# Patient Record
Sex: FEMALE | Race: WHITE | NOT HISPANIC OR LATINO
[De-identification: names, ages, dates, MRNs, and addresses within clinical notes are randomized per-mention and may not be internally consistent; named-entity substitution may affect disease eponyms.]

---

## 2017-02-22 ENCOUNTER — APPOINTMENT (OUTPATIENT)
Dept: PULMONOLOGY | Facility: CLINIC | Age: 82
End: 2017-02-22

## 2017-05-01 ENCOUNTER — APPOINTMENT (OUTPATIENT)
Dept: PULMONOLOGY | Facility: CLINIC | Age: 82
End: 2017-05-01

## 2017-05-09 ENCOUNTER — APPOINTMENT (OUTPATIENT)
Dept: PULMONOLOGY | Facility: CLINIC | Age: 82
End: 2017-05-09

## 2017-05-09 VITALS — DIASTOLIC BLOOD PRESSURE: 60 MMHG | OXYGEN SATURATION: 93 % | HEART RATE: 83 BPM | SYSTOLIC BLOOD PRESSURE: 132 MMHG

## 2017-11-07 ENCOUNTER — APPOINTMENT (OUTPATIENT)
Dept: PULMONOLOGY | Facility: CLINIC | Age: 82
End: 2017-11-07
Payer: MEDICARE

## 2017-11-07 VITALS
OXYGEN SATURATION: 98 % | SYSTOLIC BLOOD PRESSURE: 142 MMHG | DIASTOLIC BLOOD PRESSURE: 60 MMHG | WEIGHT: 137 LBS | BODY MASS INDEX: 27.67 KG/M2 | HEART RATE: 107 BPM

## 2017-11-07 VITALS — RESPIRATION RATE: 18 BRPM

## 2017-11-07 DIAGNOSIS — J98.4 OTHER DISORDERS OF LUNG: ICD-10-CM

## 2017-11-07 DIAGNOSIS — J44.9 CHRONIC OBSTRUCTIVE PULMONARY DISEASE, UNSPECIFIED: ICD-10-CM

## 2017-11-07 DIAGNOSIS — I27.20 PULMONARY HYPERTENSION, UNSPECIFIED: ICD-10-CM

## 2017-11-07 PROCEDURE — 99214 OFFICE O/P EST MOD 30 MIN: CPT

## 2017-11-07 RX ORDER — ALBUTEROL SULFATE 90 UG/1
108 (90 BASE) AEROSOL, METERED RESPIRATORY (INHALATION)
Qty: 26 | Refills: 0 | Status: ACTIVE | COMMUNITY
Start: 2017-01-02

## 2017-11-07 RX ORDER — POTASSIUM CHLORIDE 1500 MG/1
20 TABLET, EXTENDED RELEASE ORAL
Qty: 90 | Refills: 0 | Status: DISCONTINUED | COMMUNITY
Start: 2017-01-27

## 2017-11-07 RX ORDER — DILTIAZEM HYDROCHLORIDE 240 MG/1
240 CAPSULE, COATED, EXTENDED RELEASE ORAL
Refills: 0 | Status: ACTIVE | COMMUNITY

## 2017-11-07 RX ORDER — WARFARIN 2 MG/1
2 TABLET ORAL
Qty: 90 | Refills: 0 | Status: DISCONTINUED | COMMUNITY
Start: 2017-04-13

## 2017-11-07 RX ORDER — MIRTAZAPINE 15 MG/1
15 TABLET, ORALLY DISINTEGRATING ORAL
Qty: 90 | Refills: 0 | Status: ACTIVE | COMMUNITY
Start: 2017-05-02

## 2017-11-07 RX ORDER — WARFARIN SODIUM 2.5 MG/1
2.5 TABLET ORAL
Refills: 0 | Status: DISCONTINUED | COMMUNITY

## 2017-11-07 RX ORDER — PREGABALIN 50 MG/1
50 CAPSULE ORAL
Refills: 0 | Status: ACTIVE | COMMUNITY

## 2017-11-07 RX ORDER — PEN NEEDLE, DIABETIC 29 G X1/2"
31G X 5 MM NEEDLE, DISPOSABLE MISCELLANEOUS
Qty: 360 | Refills: 0 | Status: ACTIVE | COMMUNITY
Start: 2017-03-22

## 2017-11-07 RX ORDER — AMLODIPINE BESYLATE 5 MG/1
5 TABLET ORAL
Qty: 90 | Refills: 0 | Status: DISCONTINUED | COMMUNITY
Start: 2017-03-23

## 2017-11-07 RX ORDER — PREDNISONE 20 MG/1
20 TABLET ORAL
Qty: 9 | Refills: 0 | Status: DISCONTINUED | COMMUNITY
Start: 2017-05-01

## 2017-11-07 RX ORDER — PANCRELIPASE 30000; 6000; 19000 [USP'U]/1; [USP'U]/1; [USP'U]/1
6000-19000 CAPSULE, DELAYED RELEASE PELLETS ORAL
Qty: 450 | Refills: 0 | Status: DISCONTINUED | COMMUNITY
Start: 2017-03-01

## 2017-11-07 RX ORDER — HYDRALAZINE HYDROCHLORIDE 50 MG/1
50 TABLET ORAL
Refills: 0 | Status: ACTIVE | COMMUNITY

## 2017-11-07 RX ORDER — METHYLPREDNISOLONE 4 MG/1
4 TABLET ORAL
Qty: 21 | Refills: 0 | Status: DISCONTINUED | COMMUNITY
Start: 2017-01-23

## 2017-11-07 RX ORDER — LIDOCAINE 5% 700 MG/1
5 PATCH TOPICAL
Qty: 90 | Refills: 0 | Status: ACTIVE | COMMUNITY
Start: 2017-05-08

## 2017-11-07 RX ORDER — ISOSORBIDE MONONITRATE 120 MG/1
120 TABLET, EXTENDED RELEASE ORAL
Qty: 90 | Refills: 0 | Status: ACTIVE | COMMUNITY
Start: 2017-01-02

## 2017-11-07 RX ORDER — METOLAZONE 5 MG/1
5 TABLET ORAL
Qty: 90 | Refills: 0 | Status: DISCONTINUED | COMMUNITY
Start: 2017-01-09

## 2017-11-07 RX ORDER — TORSEMIDE 20 MG/1
20 TABLET ORAL
Qty: 30 | Refills: 0 | Status: DISCONTINUED | COMMUNITY
Start: 2017-05-01

## 2017-11-07 RX ORDER — DOXYCYCLINE HYCLATE 100 MG/1
100 TABLET, DELAYED RELEASE ORAL
Qty: 14 | Refills: 0 | Status: DISCONTINUED | COMMUNITY
Start: 2017-01-21

## 2017-11-07 RX ORDER — PREDNISONE 10 MG/1
10 TABLET ORAL
Qty: 15 | Refills: 0 | Status: DISCONTINUED | COMMUNITY
Start: 2017-03-20

## 2017-11-07 RX ORDER — PREGABALIN 25 MG/1
25 CAPSULE ORAL
Qty: 60 | Refills: 0 | Status: DISCONTINUED | COMMUNITY
Start: 2017-01-18

## 2017-11-07 RX ORDER — CEFPODOXIME PROXETIL 100 MG/1
100 TABLET, FILM COATED ORAL
Qty: 14 | Refills: 0 | Status: DISCONTINUED | COMMUNITY
Start: 2017-01-02

## 2017-11-07 RX ORDER — METHIMAZOLE 5 MG/1
5 TABLET ORAL
Qty: 30 | Refills: 0 | Status: DISCONTINUED | COMMUNITY
Start: 2017-01-02

## 2017-11-07 RX ORDER — METHIMAZOLE 5 MG/1
5 TABLET ORAL
Refills: 0 | Status: ACTIVE | COMMUNITY

## 2017-11-15 ENCOUNTER — APPOINTMENT (OUTPATIENT)
Dept: COLORECTAL SURGERY | Facility: CLINIC | Age: 82
End: 2017-11-15
Payer: MEDICARE

## 2017-11-15 VITALS
BODY MASS INDEX: 26.7 KG/M2 | RESPIRATION RATE: 14 BRPM | OXYGEN SATURATION: 95 % | HEART RATE: 72 BPM | WEIGHT: 136 LBS | DIASTOLIC BLOOD PRESSURE: 67 MMHG | SYSTOLIC BLOOD PRESSURE: 131 MMHG | HEIGHT: 60 IN

## 2017-11-15 DIAGNOSIS — Z86.79 PERSONAL HISTORY OF OTHER DISEASES OF THE CIRCULATORY SYSTEM: ICD-10-CM

## 2017-11-15 DIAGNOSIS — Z87.39 PERSONAL HISTORY OF OTHER DISEASES OF THE MUSCULOSKELETAL SYSTEM AND CONNECTIVE TISSUE: ICD-10-CM

## 2017-11-15 DIAGNOSIS — Z86.39 PERSONAL HISTORY OF OTHER ENDOCRINE, NUTRITIONAL AND METABOLIC DISEASE: ICD-10-CM

## 2017-11-15 DIAGNOSIS — Z80.3 FAMILY HISTORY OF MALIGNANT NEOPLASM OF BREAST: ICD-10-CM

## 2017-11-15 DIAGNOSIS — Z86.69 PERSONAL HISTORY OF OTHER DISEASES OF THE NERVOUS SYSTEM AND SENSE ORGANS: ICD-10-CM

## 2017-11-15 DIAGNOSIS — C18.6 MALIGNANT NEOPLASM OF DESCENDING COLON: ICD-10-CM

## 2017-11-15 DIAGNOSIS — K56.609 UNSPECIFIED INTESTINAL OBSTRUCTION, UNSPECIFIED AS TO PARTIAL VERSUS COMPLETE OBSTRUCTION: ICD-10-CM

## 2017-11-15 PROCEDURE — 99205 OFFICE O/P NEW HI 60 MIN: CPT

## 2017-11-15 RX ORDER — ONDANSETRON 8 MG/1
4 TABLET, FILM COATED ORAL EVERY 6 HOURS
Qty: 0 | Refills: 0 | Status: DISCONTINUED | OUTPATIENT
Start: 2017-11-16 | End: 2017-11-26

## 2017-11-15 RX ORDER — HEPARIN SODIUM 5000 [USP'U]/ML
5000 INJECTION INTRAVENOUS; SUBCUTANEOUS EVERY 12 HOURS
Qty: 0 | Refills: 0 | Status: DISCONTINUED | OUTPATIENT
Start: 2017-11-16 | End: 2017-11-26

## 2017-11-16 ENCOUNTER — INPATIENT (INPATIENT)
Facility: HOSPITAL | Age: 82
LOS: 20 days | DRG: 329 | End: 2017-12-07
Attending: FAMILY MEDICINE | Admitting: COLON & RECTAL SURGERY
Payer: MEDICARE

## 2017-11-16 VITALS
SYSTOLIC BLOOD PRESSURE: 120 MMHG | RESPIRATION RATE: 19 BRPM | TEMPERATURE: 98 F | HEART RATE: 78 BPM | DIASTOLIC BLOOD PRESSURE: 74 MMHG

## 2017-11-16 DIAGNOSIS — Z90.81 ACQUIRED ABSENCE OF SPLEEN: Chronic | ICD-10-CM

## 2017-11-16 DIAGNOSIS — Z90.710 ACQUIRED ABSENCE OF BOTH CERVIX AND UTERUS: Chronic | ICD-10-CM

## 2017-11-16 DIAGNOSIS — C18.6 MALIGNANT NEOPLASM OF DESCENDING COLON: ICD-10-CM

## 2017-11-16 DIAGNOSIS — Z90.49 ACQUIRED ABSENCE OF OTHER SPECIFIED PARTS OF DIGESTIVE TRACT: Chronic | ICD-10-CM

## 2017-11-16 DIAGNOSIS — K56.609 UNSPECIFIED INTESTINAL OBSTRUCTION, UNSPECIFIED AS TO PARTIAL VERSUS COMPLETE OBSTRUCTION: ICD-10-CM

## 2017-11-16 PROBLEM — Z86.79 HISTORY OF IRREGULAR HEARTBEAT: Status: RESOLVED | Noted: 2017-11-16 | Resolved: 2017-11-16

## 2017-11-16 PROBLEM — Z86.69 HISTORY OF CATARACT: Status: RESOLVED | Noted: 2017-11-15 | Resolved: 2017-11-16

## 2017-11-16 PROBLEM — Z87.39 HISTORY OF GOUT: Status: RESOLVED | Noted: 2017-11-15 | Resolved: 2017-11-16

## 2017-11-16 PROBLEM — Z86.39 HISTORY OF DIABETES MELLITUS: Status: RESOLVED | Noted: 2017-11-15 | Resolved: 2017-11-16

## 2017-11-16 PROBLEM — Z86.79 HISTORY OF CARDIOMEGALY: Status: RESOLVED | Noted: 2017-11-16 | Resolved: 2017-11-16

## 2017-11-16 PROBLEM — Z80.3 FAMILY HISTORY OF MALIGNANT NEOPLASM OF BREAST: Status: ACTIVE | Noted: 2017-11-16

## 2017-11-16 PROBLEM — Z86.79 HISTORY OF HYPERTENSION: Status: RESOLVED | Noted: 2017-11-15 | Resolved: 2017-11-16

## 2017-11-16 LAB
ANION GAP SERPL CALC-SCNC: 10 MMOL/L — SIGNIFICANT CHANGE UP (ref 5–17)
APTT BLD: 44.7 SEC — HIGH (ref 27.5–37.4)
BUN SERPL-MCNC: 48 MG/DL — HIGH (ref 8–20)
CALCIUM SERPL-MCNC: 8.8 MG/DL — SIGNIFICANT CHANGE UP (ref 8.6–10.2)
CHLORIDE SERPL-SCNC: 98 MMOL/L — SIGNIFICANT CHANGE UP (ref 98–107)
CK SERPL-CCNC: 31 U/L — SIGNIFICANT CHANGE UP (ref 25–170)
CO2 SERPL-SCNC: 32 MMOL/L — HIGH (ref 22–29)
CREAT SERPL-MCNC: 1.38 MG/DL — HIGH (ref 0.5–1.3)
GLUCOSE BLDC GLUCOMTR-MCNC: 106 MG/DL — HIGH (ref 70–99)
GLUCOSE SERPL-MCNC: 170 MG/DL — HIGH (ref 70–115)
HCT VFR BLD CALC: 32.9 % — LOW (ref 37–47)
HGB BLD-MCNC: 9.9 G/DL — LOW (ref 12–16)
INR BLD: 2.21 RATIO — HIGH (ref 0.88–1.16)
MAGNESIUM SERPL-MCNC: 2.5 MG/DL — SIGNIFICANT CHANGE UP (ref 1.6–2.6)
MCHC RBC-ENTMCNC: 21.7 PG — LOW (ref 27–31)
MCHC RBC-ENTMCNC: 30.1 G/DL — LOW (ref 32–36)
MCV RBC AUTO: 72.1 FL — LOW (ref 81–99)
NT-PROBNP SERPL-SCNC: 906 PG/ML — HIGH (ref 0–300)
PHOSPHATE SERPL-MCNC: 3.1 MG/DL — SIGNIFICANT CHANGE UP (ref 2.4–4.7)
PLATELET # BLD AUTO: 506 K/UL — HIGH (ref 150–400)
POTASSIUM SERPL-MCNC: 4.3 MMOL/L — SIGNIFICANT CHANGE UP (ref 3.5–5.3)
POTASSIUM SERPL-SCNC: 4.3 MMOL/L — SIGNIFICANT CHANGE UP (ref 3.5–5.3)
PROTHROM AB SERPL-ACNC: 24.7 SEC — HIGH (ref 9.8–12.7)
RBC # BLD: 4.56 M/UL — SIGNIFICANT CHANGE UP (ref 4.4–5.2)
RBC # FLD: 20.1 % — HIGH (ref 11–15.6)
SODIUM SERPL-SCNC: 140 MMOL/L — SIGNIFICANT CHANGE UP (ref 135–145)
TROPONIN T SERPL-MCNC: 0.05 NG/ML — SIGNIFICANT CHANGE UP (ref 0–0.06)
WBC # BLD: 8 K/UL — SIGNIFICANT CHANGE UP (ref 4.8–10.8)
WBC # FLD AUTO: 8 K/UL — SIGNIFICANT CHANGE UP (ref 4.8–10.8)

## 2017-11-16 PROCEDURE — 71250 CT THORAX DX C-: CPT | Mod: 26

## 2017-11-16 PROCEDURE — 71010: CPT | Mod: 26

## 2017-11-16 PROCEDURE — 74176 CT ABD & PELVIS W/O CONTRAST: CPT | Mod: 26

## 2017-11-16 PROCEDURE — 93010 ELECTROCARDIOGRAM REPORT: CPT

## 2017-11-16 PROCEDURE — 99222 1ST HOSP IP/OBS MODERATE 55: CPT

## 2017-11-16 RX ORDER — POLYETHYLENE GLYCOL 3350 17 G/17G
17 POWDER, FOR SOLUTION ORAL DAILY
Qty: 0 | Refills: 0 | Status: DISCONTINUED | OUTPATIENT
Start: 2017-11-16 | End: 2017-11-27

## 2017-11-16 RX ORDER — ALBUTEROL 90 UG/1
0.08 AEROSOL, METERED ORAL
Qty: 0 | Refills: 0 | COMMUNITY

## 2017-11-16 RX ORDER — INSULIN GLARGINE 100 [IU]/ML
14 INJECTION, SOLUTION SUBCUTANEOUS
Qty: 0 | Refills: 0 | COMMUNITY

## 2017-11-16 RX ORDER — POLYETHYLENE GLYCOL 3350 17 G/17G
17 POWDER, FOR SOLUTION ORAL
Qty: 0 | Refills: 0 | COMMUNITY

## 2017-11-16 RX ORDER — METOPROLOL TARTRATE 50 MG
1 TABLET ORAL
Qty: 0 | Refills: 0 | COMMUNITY

## 2017-11-16 RX ORDER — WARFARIN SODIUM 2.5 MG/1
1 TABLET ORAL
Qty: 0 | Refills: 0 | COMMUNITY

## 2017-11-16 RX ORDER — TIOTROPIUM BROMIDE 18 UG/1
1 CAPSULE ORAL; RESPIRATORY (INHALATION)
Qty: 0 | Refills: 0 | COMMUNITY

## 2017-11-16 RX ORDER — TIOTROPIUM BROMIDE 18 UG/1
1 CAPSULE ORAL; RESPIRATORY (INHALATION) EVERY 6 HOURS
Qty: 0 | Refills: 0 | Status: DISCONTINUED | OUTPATIENT
Start: 2017-11-16 | End: 2017-11-16

## 2017-11-16 RX ORDER — SODIUM CHLORIDE 9 MG/ML
1000 INJECTION INTRAMUSCULAR; INTRAVENOUS; SUBCUTANEOUS
Qty: 0 | Refills: 0 | Status: DISCONTINUED | OUTPATIENT
Start: 2017-11-16 | End: 2017-11-17

## 2017-11-16 RX ORDER — INSULIN GLARGINE 100 [IU]/ML
14 INJECTION, SOLUTION SUBCUTANEOUS EVERY MORNING
Qty: 0 | Refills: 0 | Status: DISCONTINUED | OUTPATIENT
Start: 2017-11-16 | End: 2017-11-25

## 2017-11-16 RX ORDER — METOPROLOL TARTRATE 50 MG
25 TABLET ORAL
Qty: 0 | Refills: 0 | Status: DISCONTINUED | OUTPATIENT
Start: 2017-11-16 | End: 2017-11-26

## 2017-11-16 RX ORDER — ASCORBIC ACID 60 MG
1 TABLET,CHEWABLE ORAL
Qty: 0 | Refills: 0 | COMMUNITY

## 2017-11-16 RX ORDER — MIRTAZAPINE 45 MG/1
1 TABLET, ORALLY DISINTEGRATING ORAL
Qty: 0 | Refills: 0 | COMMUNITY

## 2017-11-16 RX ORDER — HYDRALAZINE HCL 50 MG
5 TABLET ORAL
Qty: 0 | Refills: 0 | COMMUNITY

## 2017-11-16 RX ORDER — WARFARIN SODIUM 2.5 MG/1
5 TABLET ORAL ONCE
Qty: 0 | Refills: 0 | Status: DISCONTINUED | OUTPATIENT
Start: 2017-11-16 | End: 2017-11-16

## 2017-11-16 RX ORDER — ALBUTEROL 90 UG/1
2.5 AEROSOL, METERED ORAL EVERY 6 HOURS
Qty: 0 | Refills: 0 | Status: DISCONTINUED | OUTPATIENT
Start: 2017-11-16 | End: 2017-11-29

## 2017-11-16 RX ORDER — DILTIAZEM HCL 120 MG
240 CAPSULE, EXT RELEASE 24 HR ORAL DAILY
Qty: 0 | Refills: 0 | Status: DISCONTINUED | OUTPATIENT
Start: 2017-11-16 | End: 2017-11-27

## 2017-11-16 RX ORDER — LIPASE/PROTEASE/AMYLASE 16-48-48K
1 CAPSULE,DELAYED RELEASE (ENTERIC COATED) ORAL
Qty: 0 | Refills: 0 | COMMUNITY

## 2017-11-16 RX ORDER — LIPASE/PROTEASE/AMYLASE 16-48-48K
1 CAPSULE,DELAYED RELEASE (ENTERIC COATED) ORAL
Qty: 0 | Refills: 0 | Status: DISCONTINUED | OUTPATIENT
Start: 2017-11-16 | End: 2017-12-06

## 2017-11-16 RX ORDER — ACETAMINOPHEN 500 MG
1 TABLET ORAL
Qty: 0 | Refills: 0 | COMMUNITY

## 2017-11-16 RX ORDER — MIRTAZAPINE 45 MG/1
15 TABLET, ORALLY DISINTEGRATING ORAL DAILY
Qty: 0 | Refills: 0 | Status: DISCONTINUED | OUTPATIENT
Start: 2017-11-16 | End: 2017-11-27

## 2017-11-16 RX ORDER — HYDRALAZINE HCL 50 MG
50 TABLET ORAL
Qty: 0 | Refills: 0 | Status: DISCONTINUED | OUTPATIENT
Start: 2017-11-16 | End: 2017-11-26

## 2017-11-16 RX ORDER — ACETAMINOPHEN 500 MG
325 TABLET ORAL EVERY 6 HOURS
Qty: 0 | Refills: 0 | Status: DISCONTINUED | OUTPATIENT
Start: 2017-11-16 | End: 2017-11-19

## 2017-11-16 RX ORDER — ASCORBIC ACID 60 MG
500 TABLET,CHEWABLE ORAL DAILY
Qty: 0 | Refills: 0 | Status: DISCONTINUED | OUTPATIENT
Start: 2017-11-16 | End: 2017-12-06

## 2017-11-16 RX ORDER — DILTIAZEM HCL 120 MG
1 CAPSULE, EXT RELEASE 24 HR ORAL
Qty: 0 | Refills: 0 | COMMUNITY

## 2017-11-16 RX ORDER — ISOSORBIDE MONONITRATE 60 MG/1
120 TABLET, EXTENDED RELEASE ORAL DAILY
Qty: 0 | Refills: 0 | Status: DISCONTINUED | OUTPATIENT
Start: 2017-11-16 | End: 2017-11-27

## 2017-11-16 RX ORDER — TIOTROPIUM BROMIDE 18 UG/1
1 CAPSULE ORAL; RESPIRATORY (INHALATION) DAILY
Qty: 0 | Refills: 0 | Status: DISCONTINUED | OUTPATIENT
Start: 2017-11-16 | End: 2017-11-29

## 2017-11-16 RX ORDER — ISOSORBIDE MONONITRATE 60 MG/1
1 TABLET, EXTENDED RELEASE ORAL
Qty: 0 | Refills: 0 | COMMUNITY

## 2017-11-16 RX ORDER — LIDOCAINE 4 G/100G
1 CREAM TOPICAL DAILY
Qty: 0 | Refills: 0 | Status: DISCONTINUED | OUTPATIENT
Start: 2017-11-17 | End: 2017-12-06

## 2017-11-16 RX ADMIN — ALBUTEROL 2.5 MILLIGRAM(S): 90 AEROSOL, METERED ORAL at 12:30

## 2017-11-16 RX ADMIN — Medication 1 TABLET(S): at 21:37

## 2017-11-16 RX ADMIN — HEPARIN SODIUM 5000 UNIT(S): 5000 INJECTION INTRAVENOUS; SUBCUTANEOUS at 18:03

## 2017-11-16 RX ADMIN — Medication 50 MILLIGRAM(S): at 21:37

## 2017-11-16 RX ADMIN — Medication 25 MILLIGRAM(S): at 21:36

## 2017-11-16 RX ADMIN — Medication 25 MILLIGRAM(S): at 21:37

## 2017-11-16 NOTE — H&P ADULT - PMH
Atrial fibrillation, unspecified type    Cardiomegaly    Congestive heart failure, unspecified congestive heart failure chronicity, unspecified congestive heart failure type    Diabetes Aortic valve insufficiency, etiology of cardiac valve disease unspecified    Atrial fibrillation, unspecified type    Cardiomegaly    Congestive heart failure, unspecified congestive heart failure chronicity, unspecified congestive heart failure type    COPD, mild    Diabetes    IDDM (insulin dependent diabetes mellitus)    ILD (interstitial lung disease)    Malignant neoplasm of descending colon    Microcytic anemia    Mitral valve insufficiency, unspecified etiology    Renal insufficiency    Tricuspid valve insufficiency, unspecified etiology

## 2017-11-16 NOTE — CONSULT NOTE ADULT - SUBJECTIVE AND OBJECTIVE BOX
Chief Complaint: Pre-op eval-cardiac    HPI: 89 yo female admitted for colon surgery: recently found colon mass on colonoscopy. Pt had abd distension and bloating according to . (old records reviewed). PMH+ for atrial fib/interstitial lung disease/hypertension/iddm/splenectomy/gb/hysteresctomy. ?cva/hyperthyroidism. Denies syncope/renal/heme hx. ROS +liu but no hx of home O2 use/cp/angina. No hx of MI. Hx of AI/TR/MR.  Allergy to cipro/pcn/asa/iodine. Never smoked. No etoh. OP meds of insulin/torsemide 40 qd/zaroxolyn 5 qd/lyrica/carduizem 240/spiriva/albuterol/creon/hydralzine/tapezole/coumadin.    PAST MEDICAL & SURGICAL HISTORY:  H/O: hysterectomy  History of laparoscopic cholecystectomy  H/O splenectomy      PREVIOUS DIAGNOSTIC TESTING:      ECHO  FINDINGS: LVEF 65%/mod AI/TR/MR with moderate pulmonary hpt. 2017.    STRESS  FINDINGS:    CATHETERIZATION  FINDINGS: 2013-nl coronaries.    MEDICATIONS  (STANDING):  amylase/lipase/protease  (CREON  6,000 Units) 1 Capsule(s) Oral three times a day with meals  ascorbic acid 500 milliGRAM(s) Oral daily  calcium carbonate 1250 mG + Vitamin D (OsCal 500 + D) 1 Tablet(s) Oral two times a day  diltiazem    milliGRAM(s) Oral daily  heparin  Injectable 5000 Unit(s) SubCutaneous every 12 hours  hydrALAZINE 50 milliGRAM(s) Oral two times a day  insulin glargine Injectable (LANTUS) 14 Unit(s) SubCutaneous every morning  isosorbide   mononitrate ER Tablet (IMDUR) 120 milliGRAM(s) Oral daily  lidocaine   Patch 1 Patch Transdermal <User Schedule>  methimazole 2.5 milliGRAM(s) Oral <User Schedule>  metolazone 5 milliGRAM(s) Oral before breakfast  metoprolol     tartrate 25 milliGRAM(s) Oral two times a day  mirtazapine 15 milliGRAM(s) Oral daily  polyethylene glycol 3350 17 Gram(s) Oral daily  pregabalin 25 milliGRAM(s) Oral two times a day  tiotropium 18 MICROgram(s) Capsule 1 Capsule(s) Inhalation daily  torsemide 40 milliGRAM(s) Oral before breakfast    MEDICATIONS  (PRN):  acetaminophen  Suppository. 325 milliGRAM(s) Rectal every 6 hours PRN Mild Pain (1 - 3)  ALBUTerol    0.083% 2.5 milliGRAM(s) Nebulizer every 6 hours PRN Shortness of Breath  ondansetron Injectable 4 milliGRAM(s) IV Push every 6 hours PRN Nausea      FAMILY HISTORY:  No pertinent family history in first degree relatives      SOCIAL HISTORY:     CIGARETTES: 0    ALCOHOL: 0    ROS: Negative other than as mentioned in HPI.    Vital Signs Last 24 Hrs  T(C): 36.6 (16 Nov 2017 10:29), Max: 36.6 (16 Nov 2017 10:29)  T(F): 97.9 (16 Nov 2017 10:29), Max: 97.9 (16 Nov 2017 10:29)  HR: 78 (16 Nov 2017 10:29) (78 - 78)  BP: 110/70  RA manually(16 Nov 2017 10:29) (120/74 - 120/74)  BP(mean): --  RR: 18 non-labored (16 Nov 2017 10:29) (19 - 19)  SpO2: --    PHYSICAL EXAM:  General: Appears well developed, well nourished alert and cooperative. Elderly alert [place] afebrile kyphotic F nad.  HEENT: Head; normocephalic, atraumatic.  Eyes;   Pupils reactive, cornea wnl.  Neck; Supple, no nodes adenopathy, no NVD or carotid bruit or thyromegaly.  CARDIOVASCULAR; No murmur, rub, gallop or lift. Normal S1 and S2. Irreg rhythm/distant heart tones.  LUNGS; mildly coarse BS bilat.  ABDOMEN ; Soft, nontender without mass or organomegaly. bowel sounds decreased  EXTREMITIES; No clubbing, cyanosis or edema. Distal pulses ? ROM normal.  SKIN; warm and dry with normal turgor.  NEURO; Alert/oriented x 3/normal motor exam. No pathologic reflexes.    PSYCH; normal affect.            INTERPRETATION OF TELEMETRY:    ECG: Afib QS V1-3 nsst&t abnl.  CXR-aoical lordotic with ca aortic knob.  I&O's Detail      LABS:                        9.9    8.0   )-----------( 506      ( 16 Nov 2017 15:15 )             32.9     11-16    140  |  98  |  48.0<H>  ----------------------------<  170<H>  4.3   |  32.0<H>  |  1.38<H>    Ca    8.8      16 Nov 2017 15:15  Phos  3.1     11-16  Mg     2.5     11-16          PT/INR - ( 16 Nov 2017 15:14 )   PT: 24.7 sec;   INR: 2.21 ratio         PTT - ( 16 Nov 2017 15:14 )  PTT:44.7 sec    I&O's Summary      RADIOLOGY & ADDITIONAL STUDIES:

## 2017-11-16 NOTE — CONSULT NOTE ADULT - SUBJECTIVE AND OBJECTIVE BOX
PULMONARY CONSULT NOTE      EUGENE QUINNROLAND-[id number]    Patient is a 89y old  Female who presents with a chief complaint of Colon Mass    INTERVAL HPI/OVERNIGHT EVENTS:Pt is 88 yo WF found to have Descending colon mass.  Known to me for many years.  Has mild COPD with pulm HTN.  PFT's show FEV1 66% pred and FVC  72%.  Seen in office last week--RA sat 98%. Denies any change in baseline resp sx of KENNEDY.    MEDICATIONS  (STANDING):  amylase/lipase/protease  (CREON  6,000 Units) 1 Capsule(s) Oral three times a day with meals  ascorbic acid 500 milliGRAM(s) Oral daily  calcium carbonate 1250 mG + Vitamin D (OsCal 500 + D) 1 Tablet(s) Oral two times a day  diltiazem    milliGRAM(s) Oral daily  heparin  Injectable 5000 Unit(s) SubCutaneous every 12 hours  hydrALAZINE 50 milliGRAM(s) Oral two times a day  insulin glargine Injectable (LANTUS) 14 Unit(s) SubCutaneous every morning  isosorbide   mononitrate ER Tablet (IMDUR) 120 milliGRAM(s) Oral daily  lidocaine   Patch 1 Patch Transdermal <User Schedule>  methimazole 2.5 milliGRAM(s) Oral <User Schedule>  metolazone 5 milliGRAM(s) Oral before breakfast  metoprolol     tartrate 25 milliGRAM(s) Oral two times a day  mirtazapine 15 milliGRAM(s) Oral daily  polyethylene glycol 3350 17 Gram(s) Oral daily  pregabalin 25 milliGRAM(s) Oral two times a day  tiotropium 18 MICROgram(s) Capsule 1 Capsule(s) Inhalation daily  torsemide 40 milliGRAM(s) Oral before breakfast      MEDICATIONS  (PRN):  acetaminophen  Suppository. 325 milliGRAM(s) Rectal every 6 hours PRN Mild Pain (1 - 3)  ALBUTerol    0.083% 2.5 milliGRAM(s) Nebulizer every 6 hours PRN Shortness of Breath  ondansetron Injectable 4 milliGRAM(s) IV Push every 6 hours PRN Nausea      Allergies    Cipro (Unknown)  contrast media (iodine-based) (Unknown)  penicillins (Unknown)  shellfish (Unknown)  Valium (Unknown)    Intolerances        PAST MEDICAL & SURGICAL HISTORY:      FAMILY HISTORY:      SOCIAL HISTORY  Smoking History: Former    REVIEW OF SYSTEMS:    CONSTITUTIONAL:  As per HPI.    HEENT:  Eyes:  No diplopia or blurred vision. ENT:  No earache, sore throat or runny nose.    CARDIOVASCULAR:  No pressure, squeezing, tightness, heaviness or aching about the chest; no palpitations.    RESPIRATORY:  per hpi    GASTROINTESTINAL:  No nausea, vomiting or diarrhea.    GENITOURINARY:  No dysuria, frequency or urgency.    MUSCULOSKELETAL:  No joint pains    SKIN:  No new lesions.    NEUROLOGIC:  No paresthesias, fasciculations, seizures or weakness.    PSYCHIATRIC:  No disorder of thought or mood.    ENDOCRINE:  No heat or cold intolerance, polyuria or polydipsia.    HEMATOLOGICAL:  No easy bruising or bleeding.     Vital Signs Last 24 Hrs  T(C): 36.6 (16 Nov 2017 10:29), Max: 36.6 (16 Nov 2017 10:29)  T(F): 97.9 (16 Nov 2017 10:29), Max: 97.9 (16 Nov 2017 10:29)  HR: 78 (16 Nov 2017 10:29) (78 - 78)  BP: 120/74 (16 Nov 2017 10:29) (120/74 - 120/74)  BP(mean): --  RR: 19 (16 Nov 2017 10:29) (19 - 19)  SpO2: --    PHYSICAL EXAMINATION:    GENERAL: The patient is a well-developed, well-nourished elderly WF_in no apparent distress.     HEENT: Head is normocephalic and atraumatic. Extraocular muscles are intact. Mucous membranes are moist.     NECK: Supple.     LUNGS: crackles at bases  HEART: Regular rate and rhythm without murmur.    ABDOMEN: Soft, nontender, and nondistended.  No hepatosplenomegaly is noted.    EXTREMITIES: Without any cyanosis, clubbing, rash, lesions or edema.    NEUROLOGIC: Grossly intact.    SKIN: No ulceration or induration present.      LABS:                              MICROBIOLOGY:    RADIOLOGY & ADDITIONAL STUDIES:CXR personally reviewed.  NAPD

## 2017-11-16 NOTE — H&P ADULT - PROBLEM SELECTOR PLAN 1
Urgent Pulmonary, Cardiology, Primary care consults,     Stat ekg and CXR, cardiac enzymes/troponin, BNP    chk labs    oxygen nasal cannula    CT chest/abd/pelvis,

## 2017-11-16 NOTE — CONSULT NOTE ADULT - SUBJECTIVE AND OBJECTIVE BOX
Chief Complaint: colon mass    HPI: Old records reviewed. 88 yo Female with recent colon mass detected on colonoscopy for abd bloating. Schedule for resection. PMH + for interstitial lung disease with KENNEDY but no home O2 use/diastolic heart failure/iddm/hypertension/splenectomy/GB/hysterectomy/benign breast tumors/atrial fib-on coumadin/aortic insufficiency/mitral insufficiency. Denies MI or cp/renal/heme. ?prior cva. Hx of hyperthyroidism. Never smoked and no etoh. Allergy to cipro/pcn/iodine and asa. ROS neg for orthopnea/pnd/melena/cp/weight loss. OP meds include; albuterol/vitc/creon/cardizem 240/hydralazine/insulin/lyrica/spiriva/torsemide 40 qd/zaroxoly 5 qd/metimazole and Ca++.    PAST MEDICAL & SURGICAL HISTORY: see above      PREVIOUS DIAGNOSTIC TESTING:      ECHO  FINDINGS: Feb 2017: LVEF 65%/mild LVH/moderate AI/mod TR/moderate PA hypertsion/mod MR.    STRESS  FINDINGS:    CATHETERIZATION  FINDINGS:  2013-nl coronaries.    MEDICATIONS  (STANDING):  amylase/lipase/protease  (CREON  6,000 Units) 1 Capsule(s) Oral three times a day with meals  ascorbic acid 500 milliGRAM(s) Oral daily  calcium carbonate 1250 mG + Vitamin D (OsCal 500 + D) 1 Tablet(s) Oral two times a day  diltiazem    milliGRAM(s) Oral daily  heparin  Injectable 5000 Unit(s) SubCutaneous every 12 hours  hydrALAZINE 50 milliGRAM(s) Oral two times a day  insulin glargine Injectable (LANTUS) 14 Unit(s) SubCutaneous every morning  isosorbide   mononitrate ER Tablet (IMDUR) 120 milliGRAM(s) Oral daily  lidocaine   Patch 1 Patch Transdermal <User Schedule>  methimazole 2.5 milliGRAM(s) Oral <User Schedule>  metolazone 5 milliGRAM(s) Oral before breakfast  metoprolol     tartrate 25 milliGRAM(s) Oral two times a day  mirtazapine 15 milliGRAM(s) Oral daily  polyethylene glycol 3350 17 Gram(s) Oral daily  pregabalin 25 milliGRAM(s) Oral two times a day  tiotropium 18 MICROgram(s) Capsule 1 Capsule(s) Inhalation daily  torsemide 40 milliGRAM(s) Oral before breakfast    MEDICATIONS  (PRN):  acetaminophen  Suppository. 325 milliGRAM(s) Rectal every 6 hours PRN Mild Pain (1 - 3)  ALBUTerol    0.083% 2.5 milliGRAM(s) Nebulizer every 6 hours PRN Shortness of Breath  ondansetron Injectable 4 milliGRAM(s) IV Push every 6 hours PRN Nausea      FAMILY HISTORY:      SOCIAL HISTORY:     CIGARETTES: 0    ALCOHOL: 0    ROS: Negative other than as mentioned in HPI.    Vital Signs Last 24 Hrs  T(C): 36.6 (16 Nov 2017 10:29), Max: 36.6 (16 Nov 2017 10:29)  T(F): 97.9 (16 Nov 2017 10:29), Max: 97.9 (16 Nov 2017 10:29)  HR: 78 (16 Nov 2017 10:29) (78 - 78)  BP: 110/70 (16 Nov 2017 10:29) (120/74 - 120/74)  BP(mean): --  RR: 18 (16 Nov 2017 10:29) (19 - 19) non labored  SpO2: --    PHYSICAL EXAM:  General: Appears well developed, well nourished alert and cooperative. Elderly kyphotic afebrile F nad  HEENT: Head; normocephalic, atraumatic.  Eyes;   Pupils reactive, cornea wnl.  Neck; Supple, no nodes adenopathy, no NVD or carotid bruit or thyromegaly.  CARDIOVASCULAR; No murmur, rub, gallop or lift. Normal S1 and S2. Heart tones are distant.  LUNGS; mildly coarse BS bilat.  ABDOMEN ; Soft, nontender without mass or organomegaly. bowel sounds decreased.  EXTREMITIES; No clubbing, cyanosis or edema. Distal pulses ? ROM normal.  SKIN; warm and dry with normal turgor.  NEURO; Alert/oriented x 3/normal motor exam. No pathologic reflexes.    PSYCH; normal affect.            INTERPRETATION OF TELEMETRY:    ECG: a-fibQS V1-3 nonspecific st and t abnl-no change    I&O's Detail      LABS:                        9.9    8.0   )-----------( 506      ( 16 Nov 2017 15:15 )             32.9     11-16    140  |  98  |  48.0<H>  ----------------------------<  170<H>  4.3   |  32.0<H>  |  1.38<H>    Ca    8.8      16 Nov 2017 15:15  Phos  3.1     11-16  Mg     2.5     11-16          PT/INR - ( 16 Nov 2017 15:14 )   PT: 24.7 sec;   INR: 2.21 ratio         PTT - ( 16 Nov 2017 15:14 )  PTT:44.7 sec    I&O's Summary      RADIOLOGY & ADDITIONAL STUDIES:

## 2017-11-16 NOTE — H&P ADULT - HISTORY OF PRESENT ILLNESS
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 Pt denies Nausea/vomiting, diarrhea. C/o constipation. Denies fever/chills.    Pt direct admission for operative intervention by Dr Flowers on 11/20.    Pt c/o SOB/dyspnea and mild vague chest discomfort upon admission to the floor, also c/o tender inner left lower leg, also c/o LE edema L > R and coldness of LE's    Pulmonary consult - Dr Bang,    Primary care consult - Dr Barbosa    Cardiology consult - Dr Bush

## 2017-11-17 LAB
ALBUMIN SERPL ELPH-MCNC: 3.1 G/DL — LOW (ref 3.3–5.2)
ALP SERPL-CCNC: 87 U/L — SIGNIFICANT CHANGE UP (ref 40–120)
ALT FLD-CCNC: 11 U/L — SIGNIFICANT CHANGE UP
ANION GAP SERPL CALC-SCNC: 10 MMOL/L — SIGNIFICANT CHANGE UP (ref 5–17)
ANISOCYTOSIS BLD QL: SLIGHT — SIGNIFICANT CHANGE UP
APTT BLD: 42.5 SEC — HIGH (ref 27.5–37.4)
AST SERPL-CCNC: 17 U/L — SIGNIFICANT CHANGE UP
BILIRUB DIRECT SERPL-MCNC: 0.1 MG/DL — SIGNIFICANT CHANGE UP (ref 0–0.3)
BILIRUB INDIRECT FLD-MCNC: 0.2 MG/DL — SIGNIFICANT CHANGE UP (ref 0.2–1)
BILIRUB SERPL-MCNC: 0.3 MG/DL — LOW (ref 0.4–2)
BLD GP AB SCN SERPL QL: SIGNIFICANT CHANGE UP
BUN SERPL-MCNC: 32 MG/DL — HIGH (ref 8–20)
CALCIUM SERPL-MCNC: 8.8 MG/DL — SIGNIFICANT CHANGE UP (ref 8.6–10.2)
CEA SERPL-MCNC: 2.3 NG/ML — SIGNIFICANT CHANGE UP (ref 0–3.8)
CHLORIDE SERPL-SCNC: 99 MMOL/L — SIGNIFICANT CHANGE UP (ref 98–107)
CK SERPL-CCNC: 65 U/L — SIGNIFICANT CHANGE UP (ref 25–170)
CK SERPL-CCNC: 70 U/L — SIGNIFICANT CHANGE UP (ref 25–170)
CO2 SERPL-SCNC: 31 MMOL/L — HIGH (ref 22–29)
CREAT SERPL-MCNC: 1.21 MG/DL — SIGNIFICANT CHANGE UP (ref 0.5–1.3)
EOSINOPHIL NFR BLD AUTO: 3 % — SIGNIFICANT CHANGE UP (ref 0–5)
GLUCOSE BLDC GLUCOMTR-MCNC: 104 MG/DL — HIGH (ref 70–99)
GLUCOSE BLDC GLUCOMTR-MCNC: 207 MG/DL — HIGH (ref 70–99)
GLUCOSE BLDC GLUCOMTR-MCNC: 232 MG/DL — HIGH (ref 70–99)
GLUCOSE SERPL-MCNC: 99 MG/DL — SIGNIFICANT CHANGE UP (ref 70–115)
HBA1C BLD-MCNC: 7.6 % — HIGH (ref 4–5.6)
HCT VFR BLD CALC: 35.1 % — LOW (ref 37–47)
HGB BLD-MCNC: 10.3 G/DL — LOW (ref 12–16)
HYPOCHROMIA BLD QL: SLIGHT — SIGNIFICANT CHANGE UP
INR BLD: 1.9 RATIO — HIGH (ref 0.88–1.16)
LYMPHOCYTES # BLD AUTO: 30 % — SIGNIFICANT CHANGE UP (ref 20–55)
MCHC RBC-ENTMCNC: 21.5 PG — LOW (ref 27–31)
MCHC RBC-ENTMCNC: 29.3 G/DL — LOW (ref 32–36)
MCV RBC AUTO: 73.1 FL — LOW (ref 81–99)
MICROCYTES BLD QL: SLIGHT — SIGNIFICANT CHANGE UP
MONOCYTES NFR BLD AUTO: 10 % — SIGNIFICANT CHANGE UP (ref 3–10)
NEUTROPHILS NFR BLD AUTO: 55 % — SIGNIFICANT CHANGE UP (ref 37–73)
NEUTS BAND # BLD: 2 % — SIGNIFICANT CHANGE UP (ref 0–8)
PLAT MORPH BLD: NORMAL — SIGNIFICANT CHANGE UP
PLATELET # BLD AUTO: 492 K/UL — HIGH (ref 150–400)
POIKILOCYTOSIS BLD QL AUTO: SLIGHT — SIGNIFICANT CHANGE UP
POTASSIUM SERPL-MCNC: 3.8 MMOL/L — SIGNIFICANT CHANGE UP (ref 3.5–5.3)
POTASSIUM SERPL-SCNC: 3.8 MMOL/L — SIGNIFICANT CHANGE UP (ref 3.5–5.3)
PROT SERPL-MCNC: 6.2 G/DL — LOW (ref 6.6–8.7)
PROTHROM AB SERPL-ACNC: 21.2 SEC — HIGH (ref 9.8–12.7)
RBC # BLD: 4.8 M/UL — SIGNIFICANT CHANGE UP (ref 4.4–5.2)
RBC # FLD: 19.4 % — HIGH (ref 11–15.6)
RBC BLD AUTO: ABNORMAL
SODIUM SERPL-SCNC: 140 MMOL/L — SIGNIFICANT CHANGE UP (ref 135–145)
TYPE + AB SCN PNL BLD: SIGNIFICANT CHANGE UP
WBC # BLD: 8.7 K/UL — SIGNIFICANT CHANGE UP (ref 4.8–10.8)
WBC # FLD AUTO: 8.7 K/UL — SIGNIFICANT CHANGE UP (ref 4.8–10.8)

## 2017-11-17 RX ADMIN — Medication 500 MILLIGRAM(S): at 12:37

## 2017-11-17 RX ADMIN — HEPARIN SODIUM 5000 UNIT(S): 5000 INJECTION INTRAVENOUS; SUBCUTANEOUS at 17:00

## 2017-11-17 RX ADMIN — Medication 1 CAPSULE(S): at 12:34

## 2017-11-17 RX ADMIN — TIOTROPIUM BROMIDE 1 CAPSULE(S): 18 CAPSULE ORAL; RESPIRATORY (INHALATION) at 08:21

## 2017-11-17 RX ADMIN — MIRTAZAPINE 15 MILLIGRAM(S): 45 TABLET, ORALLY DISINTEGRATING ORAL at 12:37

## 2017-11-17 RX ADMIN — Medication 50 MILLIGRAM(S): at 06:04

## 2017-11-17 RX ADMIN — Medication 1 TABLET(S): at 06:03

## 2017-11-17 RX ADMIN — Medication 1 CAPSULE(S): at 07:33

## 2017-11-17 RX ADMIN — POLYETHYLENE GLYCOL 3350 17 GRAM(S): 17 POWDER, FOR SOLUTION ORAL at 12:37

## 2017-11-17 RX ADMIN — Medication 1 TABLET(S): at 17:00

## 2017-11-17 RX ADMIN — Medication 240 MILLIGRAM(S): at 06:04

## 2017-11-17 RX ADMIN — Medication 40 MILLIGRAM(S): at 06:04

## 2017-11-17 RX ADMIN — ISOSORBIDE MONONITRATE 120 MILLIGRAM(S): 60 TABLET, EXTENDED RELEASE ORAL at 12:37

## 2017-11-17 RX ADMIN — Medication 25 MILLIGRAM(S): at 17:00

## 2017-11-17 RX ADMIN — LIDOCAINE 1 PATCH: 4 CREAM TOPICAL at 13:57

## 2017-11-17 RX ADMIN — Medication 25 MILLIGRAM(S): at 06:03

## 2017-11-17 RX ADMIN — SODIUM CHLORIDE 50 MILLILITER(S): 9 INJECTION INTRAMUSCULAR; INTRAVENOUS; SUBCUTANEOUS at 01:19

## 2017-11-17 RX ADMIN — Medication 1 CAPSULE(S): at 16:56

## 2017-11-17 RX ADMIN — INSULIN GLARGINE 14 UNIT(S): 100 INJECTION, SOLUTION SUBCUTANEOUS at 12:42

## 2017-11-17 RX ADMIN — HEPARIN SODIUM 5000 UNIT(S): 5000 INJECTION INTRAVENOUS; SUBCUTANEOUS at 06:04

## 2017-11-17 NOTE — PROGRESS NOTE ADULT - SUBJECTIVE AND OBJECTIVE BOX
INTERVAL HPI/OVERNIGHT EVENTS:  Pt seen and examined at the bedside.  Pt is very hard of hearing.  No acute events overnight and no complaints at this time.  Pt is tolerating her DASH diet and her pain is well controlled.  Pt is expected to go to the OR for a Sigmoid resection on 11/20.      SUBJECTIVE:  Flatus: [x ] YES [ ] NO             Bowel Movement: [ ] YES [x ] NO  Pain (0-10):            Pain Control Adequate: [x ] YES [ ] NO  Nausea: [ ] YES [ x] NO            Vomiting: [ ] YES [x ] NO  Diarrhea: [ ] YES [x ] NO         Constipation: [ ] YES [x ] NO     Chest Pain: [ ] YES [x ] NO    SOB:  [ ] YES [x ] NO    MEDICATIONS  (STANDING):  amylase/lipase/protease  (CREON  6,000 Units) 1 Capsule(s) Oral three times a day with meals  ascorbic acid 500 milliGRAM(s) Oral daily  calcium carbonate 1250 mG + Vitamin D (OsCal 500 + D) 1 Tablet(s) Oral two times a day  diltiazem    milliGRAM(s) Oral daily  heparin  Injectable 5000 Unit(s) SubCutaneous every 12 hours  hydrALAZINE 50 milliGRAM(s) Oral two times a day  insulin glargine Injectable (LANTUS) 14 Unit(s) SubCutaneous every morning  isosorbide   mononitrate ER Tablet (IMDUR) 120 milliGRAM(s) Oral daily  lidocaine   Patch 1 Patch Transdermal <User Schedule>  methimazole 2.5 milliGRAM(s) Oral <User Schedule>  metolazone 5 milliGRAM(s) Oral before breakfast  metoprolol     tartrate 25 milliGRAM(s) Oral two times a day  mirtazapine 15 milliGRAM(s) Oral daily  polyethylene glycol 3350 17 Gram(s) Oral daily  pregabalin 25 milliGRAM(s) Oral two times a day  sodium chloride 0.9%. 1000 milliLiter(s) (50 mL/Hr) IV Continuous <Continuous>  tiotropium 18 MICROgram(s) Capsule 1 Capsule(s) Inhalation daily  torsemide 40 milliGRAM(s) Oral before breakfast    MEDICATIONS  (PRN):  acetaminophen  Suppository. 325 milliGRAM(s) Rectal every 6 hours PRN Mild Pain (1 - 3)  ALBUTerol    0.083% 2.5 milliGRAM(s) Nebulizer every 6 hours PRN Shortness of Breath  ondansetron Injectable 4 milliGRAM(s) IV Push every 6 hours PRN Nausea      Vital Signs Last 24 Hrs  T(C): 36.8 (16 Nov 2017 23:15), Max: 36.8 (16 Nov 2017 23:15)  T(F): 98.3 (16 Nov 2017 23:15), Max: 98.3 (16 Nov 2017 23:15)  HR: 82 (17 Nov 2017 06:02) (74 - 82)  BP: 118/74 (17 Nov 2017 06:02) (106/66 - 128/76)  BP(mean): --  RR: 18 (16 Nov 2017 23:15) (18 - 19)  SpO2: 97% (16 Nov 2017 23:15) (97% - 97%)    PHYSICAL EXAM:    Constitutional: NAD, resting comfortably, WNWD, Hard of hearing  Eyes: PERRLA, EOM intact   Head: NCAT  Neck: trachea midline, FROM  Respiratory: CTA b/l, no WRR  Cardiovascular: S1S2, RRR  Gastrointestinal: abd. softly disteneded, tympanic, NT  Genitourinary: -thornton, diaper in place  Extremities: no LE edema b/l  Neurological: AOx3, sensation grossly intact  Skin: warm, dry, intact     LABS:                        9.9    8.0   )-----------( 506      ( 16 Nov 2017 15:15 )             32.9     11-16    140  |  98  |  48.0<H>  ----------------------------<  170<H>  4.3   |  32.0<H>  |  1.38<H>    Ca    8.8      16 Nov 2017 15:15  Phos  3.1     11-16  Mg     2.5     11-16      PT/INR - ( 16 Nov 2017 15:14 )   PT: 24.7 sec;   INR: 2.21 ratio         PTT - ( 16 Nov 2017 15:14 )  PTT:44.7 sec

## 2017-11-17 NOTE — PROGRESS NOTE ADULT - ASSESSMENT
89F w/ tubular adenoma at the ileocecal valve and villous adenoma going to the OR on 11/20 sigmoid resection  Neuro: Pain control as prescribed   Cardio: Monitor Vital signs, cont. home meds  Pulm: Incentive spirometry and deep breathing, NC, cont. duoneb as per Pulm, Cleared for OR as per pulm  GI: Diet: DASH; Monitor bowel function; Bowel regimen   MS: Encourage OOB and ambulation  DVT ppx: Subq Hep 5000U q12  Endo: Continue lantus, Monitor blood glucose 89F w/ tubular adenoma at the ileocecal valve and villous adenoma going to the OR on 11/20 sigmoid resection  Neuro: Pain control as prescribed   Cardio: Monitor Vital signs, cont. home meds, cardiology cleared for surgery despite her comorbidities.   Pulm: Incentive spirometry and deep breathing, NC, cont. duoneb as per Pulm, Cleared for OR as per pulm  GI: Diet: DASH; Monitor bowel function; Bowel regimen   MS: Encourage OOB and ambulation  DVT ppx: Subq Hep 5000U q12  Endo: Continue lantus, Monitor blood glucose 89F w/ tubular adenoma at the ileocecal valve and villous adenoma going to the OR on 11/20 sigmoid resection  Neuro: Pain control as prescribed   Cardio: Monitor Vital signs, cont. home meds, cardiology cleared for surgery despite her comorbidities.   Pulm: Incentive spirometry and deep breathing, NC, cont. duoneb as per Pulm, Cleared for OR as per pulm  GI: Diet: DASH; Monitor bowel function; Bowel regimen   MS: Encourage OOB and ambulation  DVT ppx: Subq Hep 5000U q12  Endo: Continue lantus, Monitor blood glucose   f/u internal medicine clearance

## 2017-11-17 NOTE — CONSULT NOTE ADULT - SUBJECTIVE AND OBJECTIVE BOX
Patient is a 89y old  Female who presents with a chief complaint of colon mass (16 Nov 2017 15:13)      HPI:  89 yo female, c/o progressively enlarging abd girth over 1 yr, s/p colonoscopy 10/19/17, found to have tubular adenoma at ileocecal valve and 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    Pt scheduled for surgery next week.    Pulmonary consult - Dr Bang,    Primary care consult - Dr Barbosa    Cardiology consult - Dr Bush (16 Nov 2017 15:13)      PAST MEDICAL & SURGICAL HISTORY:  Renal insufficiency  Malignant neoplasm of descending colon  Microcytic anemia  ILD (interstitial lung disease)  Tricuspid valve insufficiency, unspecified etiology  Mitral valve insufficiency, unspecified etiology  Aortic valve insufficiency, etiology of cardiac valve disease unspecified  IDDM (insulin dependent diabetes mellitus)  COPD, mild  Atrial fibrillation, unspecified type  Diabetes  Cardiomegaly  Congestive heart failure, unspecified congestive heart failure chronicity, unspecified congestive heart failure type  H/O: hysterectomy  History of laparoscopic cholecystectomy  H/O splenectomy      FAMILY HISTORY:  No pertinent family history in first degree relatives      Social History:  No tobacco, etoh nor drug abuse    MEDICATIONS  (STANDING):  amylase/lipase/protease  (CREON  6,000 Units) 1 Capsule(s) Oral three times a day with meals  ascorbic acid 500 milliGRAM(s) Oral daily  calcium carbonate 1250 mG + Vitamin D (OsCal 500 + D) 1 Tablet(s) Oral two times a day  diltiazem    milliGRAM(s) Oral daily  heparin  Injectable 5000 Unit(s) SubCutaneous every 12 hours  hydrALAZINE 50 milliGRAM(s) Oral two times a day  insulin glargine Injectable (LANTUS) 14 Unit(s) SubCutaneous every morning  isosorbide   mononitrate ER Tablet (IMDUR) 120 milliGRAM(s) Oral daily  lidocaine   Patch 1 Patch Transdermal <User Schedule>  methimazole 2.5 milliGRAM(s) Oral <User Schedule>  metolazone 5 milliGRAM(s) Oral before breakfast  metoprolol     tartrate 25 milliGRAM(s) Oral two times a day  mirtazapine 15 milliGRAM(s) Oral daily  polyethylene glycol 3350 17 Gram(s) Oral daily  pregabalin 25 milliGRAM(s) Oral two times a day  sodium chloride 0.9%. 1000 milliLiter(s) (50 mL/Hr) IV Continuous <Continuous>  tiotropium 18 MICROgram(s) Capsule 1 Capsule(s) Inhalation daily  torsemide 40 milliGRAM(s) Oral before breakfast    MEDICATIONS  (PRN):  acetaminophen  Suppository. 325 milliGRAM(s) Rectal every 6 hours PRN Mild Pain (1 - 3)  ALBUTerol    0.083% 2.5 milliGRAM(s) Nebulizer every 6 hours PRN Shortness of Breath  ondansetron Injectable 4 milliGRAM(s) IV Push every 6 hours PRN Nausea      Allergies    Cipro (Unknown)  contrast media (iodine-based) (Unknown)  penicillins (Unknown)  shellfish (Unknown)  Valium (Unknown)    Intolerances        REVIEW OF SYSTEMS:    CONSTITUTIONAL: No fever, weight loss, + fatigue  EYES: No eye pain, visual disturbances, or discharge  ENMT:  No difficulty hearing, tinnitus, vertigo; No sinus or throat pain  NECK: No pain or stiffness  BREASTS: No pain, masses, or nipple discharge  RESPIRATORY: No cough, wheezing, chills or hemoptysis; +  shortness of breath with exertion  CARDIOVASCULAR: No chest pain, palpitations, dizziness, + leg swelling  GASTROINTESTINAL: + abd pain and bloating. No nausea, vomiting, or hematemesis; No diarrhea or constipation. No melena or hematochezia.  GENITOURINARY: No dysuria, frequency, hematuria, or incontinence  NEUROLOGICAL: No headaches, memory loss, loss of strength, numbness, or tremors  SKIN: No itching, burning, rashes, or lesions   LYMPH NODES: No enlarged glands        Vital Signs Last 24 Hrs  T(C): 36.7 (17 Nov 2017 08:29), Max: 36.8 (16 Nov 2017 23:15)  T(F): 98 (17 Nov 2017 08:29), Max: 98.3 (16 Nov 2017 23:15)  HR: 72 (17 Nov 2017 08:29) (72 - 82)  BP: 121/61 (17 Nov 2017 08:29) (106/66 - 128/76)  BP(mean): --  RR: 19 (17 Nov 2017 08:29) (18 - 19)  SpO2: 97% (16 Nov 2017 23:15) (97% - 97%)    PHYSICAL EXAM:    GENERAL: NAD, generalized weakness  HEAD:  Atraumatic, Normocephalic  EYES: EOMI, PERRLA, conjunctiva and sclera clear  ENMT: No tonsillar erythema, exudates, or enlargement; Moist mucous membranes, Good dentition, No lesions  NECK: Supple, No JVD, Normal thyroid  NERVOUS SYSTEM:  Alert & Oriented X3, intact and symmetric  CHEST/LUNG: + chronic crackles  HEART: Irr no rub  ABDOMEN: Soft, Nontender + distension + BS  EXTREMITIES:  2+ Peripheral Pulses, Tr edema  LYMPH: No lymphadenopathy noted  SKIN: No rashes or lesions      LABS:                        10.3   8.7   )-----------( 492      ( 17 Nov 2017 08:06 )             35.1     11-17    140  |  99  |  32.0<H>  ----------------------------<  99  3.8   |  31.0<H>  |  1.21    Ca    8.8      17 Nov 2017 08:06  Phos  3.1     11-16  Mg     2.5     11-16    TPro  6.2<L>  /  Alb  3.1<L>  /  TBili  0.3<L>  /  DBili  0.1  /  AST  17  /  ALT  11  /  AlkPhos  87  11-17    PT/INR - ( 17 Nov 2017 08:06 )   PT: 21.2 sec;   INR: 1.90 ratio         PTT - ( 17 Nov 2017 08:06 )  PTT:42.5 sec    Magnesium, Serum: 2.5 mg/dL (11-16 @ 15:15)  Phosphorus Level, Serum: 3.1 mg/dL (11-16 @ 15:15)      RADIOLOGY & ADDITIONAL TESTS:  < from: CT Abdomen and Pelvis w/ Oral Cont (11.16.17 @ 19:36) >   EXAM:  CT ABDOMEN AND PELVIS OC                         EXAM:  CT CHEST OC                          PROCEDURE DATE:  11/16/2017          INTERPRETATION:  HISTORY:  Colon carcinoma. Staging evaluation. Extent of   disease evaluation. .    Date/Timeof exam: 11/16/2017 7:27 PM    TECHNIQUE:  Sections were obtained from the lung apices to the symphysis   pubis with oral but without intravenous contrast.      COMPARISON EXAMINATION:     Chest CT scan 5/9/2013.    FINDINGS:    No evidence of mediastinal or hilar lymphadenopathy. No evidence of   pleural or pericardial effusion. Cardiomegaly is noted.    No evidence of axillary adenopathy.    Mild atelectasis in the lingula and left lower lobe. The right lung is   clear.    Status post cholecystectomy. No evidence of liver metastases on this   noncontrast study.    Status post lobectomy.    Pancreatic calcifications are identified consistent with chronic   pancreatitis. There is marked atrophy of the pancreatic parenchyma.    The adrenal glands demonstrate normal size and contour. The kidneys   reveal a normal unenhanced morphology without evidence of stone or   hydronephrosis.    No evidence of retroperitoneal or pelvic lymphadenopathy.    The bladder is unremarkable. Status post hysterectomy.    No colonic lesion is seen on this study. No evidence of ileus or   obstruction.    No destructive metastatic lesions are identified.    IMPRESSION:     Evidence of chronic pancreatitis.    No evidence of metastatic disease in the chest abdomen or pelvis.      < end of copied text >

## 2017-11-18 LAB
ANION GAP SERPL CALC-SCNC: 9 MMOL/L — SIGNIFICANT CHANGE UP (ref 5–17)
ANISOCYTOSIS BLD QL: SIGNIFICANT CHANGE UP
BASOPHILS # BLD AUTO: 0 K/UL — SIGNIFICANT CHANGE UP (ref 0–0.2)
BASOPHILS NFR BLD AUTO: 0.2 % — SIGNIFICANT CHANGE UP (ref 0–2)
BUN SERPL-MCNC: 38 MG/DL — HIGH (ref 8–20)
BURR CELLS BLD QL SMEAR: PRESENT — SIGNIFICANT CHANGE UP
CALCIUM SERPL-MCNC: 8.9 MG/DL — SIGNIFICANT CHANGE UP (ref 8.6–10.2)
CHLORIDE SERPL-SCNC: 96 MMOL/L — LOW (ref 98–107)
CO2 SERPL-SCNC: 36 MMOL/L — HIGH (ref 22–29)
CREAT SERPL-MCNC: 1.38 MG/DL — HIGH (ref 0.5–1.3)
ELLIPTOCYTES BLD QL SMEAR: SLIGHT — SIGNIFICANT CHANGE UP
EOSINOPHIL # BLD AUTO: 0.3 K/UL — SIGNIFICANT CHANGE UP (ref 0–0.5)
EOSINOPHIL NFR BLD AUTO: 3 % — SIGNIFICANT CHANGE UP (ref 0–6)
GLUCOSE BLDC GLUCOMTR-MCNC: 132 MG/DL — HIGH (ref 70–99)
GLUCOSE BLDC GLUCOMTR-MCNC: 206 MG/DL — HIGH (ref 70–99)
GLUCOSE BLDC GLUCOMTR-MCNC: 235 MG/DL — HIGH (ref 70–99)
GLUCOSE SERPL-MCNC: 100 MG/DL — SIGNIFICANT CHANGE UP (ref 70–115)
HCT VFR BLD CALC: 33.5 % — LOW (ref 37–47)
HGB BLD-MCNC: 9.9 G/DL — LOW (ref 12–16)
HYPOCHROMIA BLD QL: SLIGHT — SIGNIFICANT CHANGE UP
INR BLD: 1.59 RATIO — HIGH (ref 0.88–1.16)
LYMPHOCYTES # BLD AUTO: 3.5 K/UL — SIGNIFICANT CHANGE UP (ref 1–4.8)
LYMPHOCYTES # BLD AUTO: 36.5 % — SIGNIFICANT CHANGE UP (ref 20–55)
MACROCYTES BLD QL: SLIGHT — SIGNIFICANT CHANGE UP
MAGNESIUM SERPL-MCNC: 2.1 MG/DL — SIGNIFICANT CHANGE UP (ref 1.8–2.6)
MCHC RBC-ENTMCNC: 21.4 PG — LOW (ref 27–31)
MCHC RBC-ENTMCNC: 29.6 G/DL — LOW (ref 32–36)
MCV RBC AUTO: 72.5 FL — LOW (ref 81–99)
MICROCYTES BLD QL: SIGNIFICANT CHANGE UP
MONOCYTES # BLD AUTO: 1.3 K/UL — HIGH (ref 0–0.8)
MONOCYTES NFR BLD AUTO: 13.1 % — HIGH (ref 3–10)
NEUTROPHILS # BLD AUTO: 4.6 K/UL — SIGNIFICANT CHANGE UP (ref 1.8–8)
NEUTROPHILS NFR BLD AUTO: 47 % — SIGNIFICANT CHANGE UP (ref 37–73)
OVALOCYTES BLD QL SMEAR: SLIGHT — SIGNIFICANT CHANGE UP
PHOSPHATE SERPL-MCNC: 3.4 MG/DL — SIGNIFICANT CHANGE UP (ref 2.4–4.7)
PLAT MORPH BLD: NORMAL — SIGNIFICANT CHANGE UP
PLATELET # BLD AUTO: 500 K/UL — HIGH (ref 150–400)
POIKILOCYTOSIS BLD QL AUTO: SLIGHT — SIGNIFICANT CHANGE UP
POLYCHROMASIA BLD QL SMEAR: SLIGHT — SIGNIFICANT CHANGE UP
POTASSIUM SERPL-MCNC: 3.4 MMOL/L — LOW (ref 3.5–5.3)
POTASSIUM SERPL-SCNC: 3.4 MMOL/L — LOW (ref 3.5–5.3)
PROTHROM AB SERPL-ACNC: 17.6 SEC — HIGH (ref 9.8–12.7)
RBC # BLD: 4.62 M/UL — SIGNIFICANT CHANGE UP (ref 4.4–5.2)
RBC # FLD: 19.3 % — HIGH (ref 11–15.6)
RBC BLD AUTO: ABNORMAL
SCHISTOCYTES BLD QL AUTO: SLIGHT — SIGNIFICANT CHANGE UP
SODIUM SERPL-SCNC: 141 MMOL/L — SIGNIFICANT CHANGE UP (ref 135–145)
SPHEROCYTES BLD QL SMEAR: SLIGHT — SIGNIFICANT CHANGE UP
TARGETS BLD QL SMEAR: SLIGHT — SIGNIFICANT CHANGE UP
WBC # BLD: 9.7 K/UL — SIGNIFICANT CHANGE UP (ref 4.8–10.8)
WBC # FLD AUTO: 9.7 K/UL — SIGNIFICANT CHANGE UP (ref 4.8–10.8)

## 2017-11-18 RX ORDER — METRONIDAZOLE 500 MG
500 TABLET ORAL
Qty: 0 | Refills: 0 | Status: COMPLETED | OUTPATIENT
Start: 2017-11-19 | End: 2017-11-19

## 2017-11-18 RX ORDER — SOD SULF/SODIUM/NAHCO3/KCL/PEG
4000 SOLUTION, RECONSTITUTED, ORAL ORAL ONCE
Qty: 0 | Refills: 0 | Status: DISCONTINUED | OUTPATIENT
Start: 2017-11-18 | End: 2017-11-18

## 2017-11-18 RX ORDER — POTASSIUM CHLORIDE 20 MEQ
20 PACKET (EA) ORAL
Qty: 0 | Refills: 0 | Status: COMPLETED | OUTPATIENT
Start: 2017-11-18 | End: 2017-11-18

## 2017-11-18 RX ORDER — SOD SULF/SODIUM/NAHCO3/KCL/PEG
4000 SOLUTION, RECONSTITUTED, ORAL ORAL ONCE
Qty: 0 | Refills: 0 | Status: COMPLETED | OUTPATIENT
Start: 2017-11-19 | End: 2017-11-19

## 2017-11-18 RX ORDER — NEOMYCIN SULFATE 500 MG/1
1000 TABLET ORAL
Qty: 0 | Refills: 0 | Status: COMPLETED | OUTPATIENT
Start: 2017-11-19 | End: 2017-11-20

## 2017-11-18 RX ADMIN — Medication 20 MILLIEQUIVALENT(S): at 17:54

## 2017-11-18 RX ADMIN — TIOTROPIUM BROMIDE 1 CAPSULE(S): 18 CAPSULE ORAL; RESPIRATORY (INHALATION) at 11:06

## 2017-11-18 RX ADMIN — Medication 25 MILLIGRAM(S): at 17:53

## 2017-11-18 RX ADMIN — Medication 1 TABLET(S): at 06:29

## 2017-11-18 RX ADMIN — Medication 20 MILLIEQUIVALENT(S): at 12:07

## 2017-11-18 RX ADMIN — Medication 1 TABLET(S): at 17:53

## 2017-11-18 RX ADMIN — Medication 1 CAPSULE(S): at 08:49

## 2017-11-18 RX ADMIN — Medication 240 MILLIGRAM(S): at 06:29

## 2017-11-18 RX ADMIN — ISOSORBIDE MONONITRATE 120 MILLIGRAM(S): 60 TABLET, EXTENDED RELEASE ORAL at 15:52

## 2017-11-18 RX ADMIN — Medication 1 CAPSULE(S): at 10:39

## 2017-11-18 RX ADMIN — HEPARIN SODIUM 5000 UNIT(S): 5000 INJECTION INTRAVENOUS; SUBCUTANEOUS at 17:52

## 2017-11-18 RX ADMIN — Medication 20 MILLIEQUIVALENT(S): at 15:52

## 2017-11-18 RX ADMIN — Medication 40 MILLIGRAM(S): at 10:39

## 2017-11-18 RX ADMIN — INSULIN GLARGINE 14 UNIT(S): 100 INJECTION, SOLUTION SUBCUTANEOUS at 10:39

## 2017-11-18 RX ADMIN — Medication 1 CAPSULE(S): at 17:49

## 2017-11-18 RX ADMIN — LIDOCAINE 1 PATCH: 4 CREAM TOPICAL at 01:30

## 2017-11-18 RX ADMIN — MIRTAZAPINE 15 MILLIGRAM(S): 45 TABLET, ORALLY DISINTEGRATING ORAL at 15:52

## 2017-11-18 RX ADMIN — Medication 25 MILLIGRAM(S): at 06:29

## 2017-11-18 RX ADMIN — Medication 50 MILLIGRAM(S): at 06:29

## 2017-11-18 RX ADMIN — HEPARIN SODIUM 5000 UNIT(S): 5000 INJECTION INTRAVENOUS; SUBCUTANEOUS at 06:27

## 2017-11-18 RX ADMIN — Medication 500 MILLIGRAM(S): at 17:52

## 2017-11-18 RX ADMIN — POLYETHYLENE GLYCOL 3350 17 GRAM(S): 17 POWDER, FOR SOLUTION ORAL at 15:52

## 2017-11-18 RX ADMIN — Medication 50 MILLIGRAM(S): at 17:52

## 2017-11-18 RX ADMIN — Medication 25 MILLIGRAM(S): at 17:54

## 2017-11-18 NOTE — PROGRESS NOTE ADULT - SUBJECTIVE AND OBJECTIVE BOX
NEPHROLOGY INTERVAL HPI/OVERNIGHT EVENTS:  pt resting comfortably when seen earlier  no acute dsitress    MEDICATIONS  (STANDING):  amylase/lipase/protease  (CREON  6,000 Units) 1 Capsule(s) Oral three times a day with meals  ascorbic acid 500 milliGRAM(s) Oral daily  calcium carbonate 1250 mG + Vitamin D (OsCal 500 + D) 1 Tablet(s) Oral two times a day  diltiazem    milliGRAM(s) Oral daily  heparin  Injectable 5000 Unit(s) SubCutaneous every 12 hours  hydrALAZINE 50 milliGRAM(s) Oral two times a day  insulin glargine Injectable (LANTUS) 14 Unit(s) SubCutaneous every morning  isosorbide   mononitrate ER Tablet (IMDUR) 120 milliGRAM(s) Oral daily  lidocaine   Patch 1 Patch Transdermal daily  methimazole 2.5 milliGRAM(s) Oral <User Schedule>  metolazone 5 milliGRAM(s) Oral before breakfast  metoprolol     tartrate 25 milliGRAM(s) Oral two times a day  mirtazapine 15 milliGRAM(s) Oral daily  polyethylene glycol 3350 17 Gram(s) Oral daily  potassium chloride    Tablet ER 20 milliEquivalent(s) Oral every 2 hours  pregabalin 25 milliGRAM(s) Oral two times a day  tiotropium 18 MICROgram(s) Capsule 1 Capsule(s) Inhalation daily  torsemide 40 milliGRAM(s) Oral before breakfast    MEDICATIONS  (PRN):  acetaminophen  Suppository. 325 milliGRAM(s) Rectal every 6 hours PRN Mild Pain (1 - 3)  ALBUTerol    0.083% 2.5 milliGRAM(s) Nebulizer every 6 hours PRN Shortness of Breath  ondansetron Injectable 4 milliGRAM(s) IV Push every 6 hours PRN Nausea      Allergies    Cipro (Unknown)  contrast media (iodine-based) (Unknown)  penicillins (Unknown)  shellfish (Unknown)  Valium (Unknown)          Vital Signs Last 24 Hrs  T(C): 37.2 (17 Nov 2017 23:58), Max: 37.2 (17 Nov 2017 23:58)  T(F): 98.9 (17 Nov 2017 23:58), Max: 98.9 (17 Nov 2017 23:58)  HR: 61 (17 Nov 2017 23:58) (61 - 75)  BP: 120/67 (17 Nov 2017 23:58) (112/55 - 120/67)  BP(mean): --  RR: 20 (17 Nov 2017 23:58) (20 - 20)  SpO2: 98% (17 Nov 2017 23:58) (98% - 100%)    PHYSICAL EXAM:  GENERAL: NAD, generalized weakness  HEAD:  Atraumatic, Normocephalic  EYES: EOMI, PERRLA, conjunctiva and sclera clear  ENMT: No tonsillar erythema, exudates, or enlargement; Moist mucous membranes, Good dentition, No lesions  NECK: Supple, No JVD, Normal thyroid  NERVOUS SYSTEM:  Alert & Oriented X3, intact and symmetric  CHEST/LUNG: + chronic crackles  HEART: Irr no rub  ABDOMEN: Soft, Nontender + distension + BS  EXTREMITIES:  2+ Peripheral Pulses, Tr edema  LYMPH: No lymphadenopathy noted  SKIN: No rashes or lesions    LABS:                        9.9    9.7   )-----------( 500      ( 18 Nov 2017 08:31 )             33.5     11-18    141  |  96<L>  |  38.0<H>  ----------------------------<  100  3.4<L>   |  36.0<H>  |  1.38<H>    Ca    8.9      18 Nov 2017 08:29  Phos  3.4     11-18  Mg     2.1     11-18    TPro  6.2<L>  /  Alb  3.1<L>  /  TBili  0.3<L>  /  DBili  0.1  /  AST  17  /  ALT  11  /  AlkPhos  87  11-17    PT/INR - ( 18 Nov 2017 08:31 )   PT: 17.6 sec;   INR: 1.59 ratio         PTT - ( 17 Nov 2017 08:06 )  PTT:42.5 sec    Magnesium, Serum: 2.1 mg/dL (11-18 @ 08:29)  Phosphorus Level, Serum: 3.4 mg/dL (11-18 @ 08:29)      RADIOLOGY & ADDITIONAL TESTS:

## 2017-11-18 NOTE — PROGRESS NOTE ADULT - SUBJECTIVE AND OBJECTIVE BOX
89 yr old female with large desending colon polyp/mass suspecious for cancer and partial obstruction. plan for surgery monday. bowel prep sunday. risk and benefits fully explained to patient and  including bleeding, infection, sepsis, hernias, dvt, pe, mi, stroke, leak and peritonitis , need for ostomy and death.  CT: no metastasis  cea: normal  Hba1c elevated

## 2017-11-18 NOTE — PROGRESS NOTE ADULT - ASSESSMENT
89 yr old female with left colon mass, rule out cancer. due to partial obstructive symptoms will need surgery monday despite elevated hba1c. will bowel prep sunday, oral and iv abx, dvt prophylaxis, PT. clearance by medicine.

## 2017-11-18 NOTE — PROGRESS NOTE ADULT - SUBJECTIVE AND OBJECTIVE BOX
INTERVAL HPI/OVERNIGHT EVENTS/SUBJECTIVE:  89yF, direct admit for colon resection scheduled for 11/20. Seen and examined at bedside. Awaiting consults for clearance.     ICU Vital Signs Last 24 Hrs  T(C): 37.2 (17 Nov 2017 23:58), Max: 37.2 (17 Nov 2017 23:58)  T(F): 98.9 (17 Nov 2017 23:58), Max: 98.9 (17 Nov 2017 23:58)  HR: 61 (17 Nov 2017 23:58) (61 - 75)  BP: 120/67 (17 Nov 2017 23:58) (112/55 - 120/67)  BP(mean): --  ABP: --  ABP(mean): --  RR: 20 (17 Nov 2017 23:58) (20 - 20)  SpO2: 98% (17 Nov 2017 23:58) (98% - 100%)      I&O's Detail    17 Nov 2017 07:01  -  18 Nov 2017 07:00  --------------------------------------------------------  IN:    sodium chloride 0.9%: 250 mL  Total IN: 250 mL    OUT:  Total OUT: 0 mL    Total NET: 250 mL      MEDICATIONS  (STANDING):  amylase/lipase/protease  (CREON  6,000 Units) 1 Capsule(s) Oral three times a day with meals  ascorbic acid 500 milliGRAM(s) Oral daily  calcium carbonate 1250 mG + Vitamin D (OsCal 500 + D) 1 Tablet(s) Oral two times a day  diltiazem    milliGRAM(s) Oral daily  heparin  Injectable 5000 Unit(s) SubCutaneous every 12 hours  hydrALAZINE 50 milliGRAM(s) Oral two times a day  insulin glargine Injectable (LANTUS) 14 Unit(s) SubCutaneous every morning  isosorbide   mononitrate ER Tablet (IMDUR) 120 milliGRAM(s) Oral daily  lidocaine   Patch 1 Patch Transdermal daily  methimazole 2.5 milliGRAM(s) Oral <User Schedule>  metolazone 5 milliGRAM(s) Oral before breakfast  metoprolol     tartrate 25 milliGRAM(s) Oral two times a day  mirtazapine 15 milliGRAM(s) Oral daily  polyethylene glycol 3350 17 Gram(s) Oral daily  pregabalin 25 milliGRAM(s) Oral two times a day  tiotropium 18 MICROgram(s) Capsule 1 Capsule(s) Inhalation daily  torsemide 40 milliGRAM(s) Oral before breakfast    MEDICATIONS  (PRN):  acetaminophen  Suppository. 325 milliGRAM(s) Rectal every 6 hours PRN Mild Pain (1 - 3)  ALBUTerol    0.083% 2.5 milliGRAM(s) Nebulizer every 6 hours PRN Shortness of Breath  ondansetron Injectable 4 milliGRAM(s) IV Push every 6 hours PRN Nausea    MISC:     PHYSICAL EXAM:    Constitutional: NAD, resting comfortably, WNWD, Hard of hearing  Eyes: PERRLA, EOM intact   Head: NCAT  Neck: trachea midline, FROM  Respiratory: non-labored, no accessory muscle use  Cardiovascular: S1S2  Genitourinary: -thornton  Skin: warm, dry, intact       LABS:  CBC Full  -  ( 18 Nov 2017 08:31 )  WBC Count : 9.7 K/uL  Hemoglobin : 9.9 g/dL  Hematocrit : 33.5 %  Platelet Count - Automated : 500 K/uL  Mean Cell Volume : 72.5 fl  Mean Cell Hemoglobin : 21.4 pg  Mean Cell Hemoglobin Concentration : 29.6 g/dL  Auto Neutrophil # : 4.6 K/uL  Auto Lymphocyte # : 3.5 K/uL  Auto Monocyte # : 1.3 K/uL  Auto Eosinophil # : 0.3 K/uL  Auto Basophil # : 0.0 K/uL  Auto Neutrophil % : 47.0 %  Auto Lymphocyte % : 36.5 %  Auto Monocyte % : 13.1 %  Auto Eosinophil % : 3.0 %  Auto Basophil % : 0.2 %    11-18    141  |  96<L>  |  38.0<H>  ----------------------------<  100  3.4<L>   |  36.0<H>  |  1.38<H>    Ca    8.9      18 Nov 2017 08:29  Phos  3.4     11-18  Mg     2.1     11-18    TPro  6.2<L>  /  Alb  3.1<L>  /  TBili  0.3<L>  /  DBili  0.1  /  AST  17  /  ALT  11  /  AlkPhos  87  11-17    PT/INR - ( 18 Nov 2017 08:31 )   PT: 17.6 sec;   INR: 1.59 ratio         PTT - ( 17 Nov 2017 08:06 )  PTT:42.5 sec          LIVER FUNCTIONS - ( 17 Nov 2017 08:06 )  Alb: 3.1 g/dL / Pro: 6.2 g/dL / ALK PHOS: 87 U/L / ALT: 11 U/L / AST: 17 U/L / GGT: x           CARDIAC MARKERS ( 17 Nov 2017 08:06 )  x     / x     / 65 U/L / x     / x      CARDIAC MARKERS ( 17 Nov 2017 04:00 )  x     / x     / 70 U/L / x     / x      CARDIAC MARKERS ( 16 Nov 2017 17:48 )  x     / 0.05 ng/mL / 31 U/L / x     / x        ASSESSMENT/PLAN:  89yFemale scheduled for OR for sigmoid resection on 11/20.   -awaiting hospitalist clearance  -pre-op for Monday  -pain control  -monitor vitals, home meds  -appreciate cardio, renal clearance  -DASH diet  -dvt ppx  -will discuss plan with attending

## 2017-11-18 NOTE — PROGRESS NOTE ADULT - ASSESSMENT
CRF, ARF due to pre renal azotemia due to diuretics  (Pt well known to me from office)  Colonic neoplasm scheduled for OR  Afib; CHF  COPD/ILD  DM  - continue current Rx for now  - pt cleared for procedure from renal and medical standpoint (at elevated risk due to her co morbid conditions)

## 2017-11-18 NOTE — PATIENT PROFILE ADULT. - PMH
Aortic valve insufficiency, etiology of cardiac valve disease unspecified    Atrial fibrillation, unspecified type    Cardiomegaly    Congestive heart failure, unspecified congestive heart failure chronicity, unspecified congestive heart failure type    COPD, mild    Diabetes    IDDM (insulin dependent diabetes mellitus)    ILD (interstitial lung disease)    Malignant neoplasm of descending colon    Microcytic anemia    Mitral valve insufficiency, unspecified etiology    Renal insufficiency    Tricuspid valve insufficiency, unspecified etiology

## 2017-11-18 NOTE — PROGRESS NOTE ADULT - SUBJECTIVE AND OBJECTIVE BOX
Patient is a 89y old  Female who presents with a chief complaint of colon mass (2017 15:13)  She is scheduled to have a LAPAROSCOPIC POSSIBLE OPEN SIGMOID LOW ANTERIOR   RESECTION, MOBILIZATION OF SPLENIC FLEXURE, RIGID PROCTOSIGMOIDOSCOPY,  POSSIBLE OSTOMY    PAST MEDICAL HISTORY:  Renal insufficiency  Malignant neoplasm of descending colon  Microcytic anemia  Interstitial lung disease  Tricuspid valve insufficiency  Mitral valve insufficiency  Aortic valve insufficiency  IDDM   COPD, mild  Atrial fibrillation  Diabetes  Cardiomegaly  Congestive heart failure      PAST SURGICAL HISTORY:  H/O: hysterectomy  History of laparoscopic cholecystectomy  H/O splenectomy      MEDICATIONS  (STANDING):  amylase/lipase/protease  (CREON  6,000 Units) 1 Capsule(s) Oral three times a day with meals  ascorbic acid 500 milliGRAM(s) Oral daily  calcium carbonate 1250 mG + Vitamin D (OsCal 500 + D) 1 Tablet(s) Oral two times a day  diltiazem    milliGRAM(s) Oral daily  heparin  Injectable 5000 Unit(s) SubCutaneous every 12 hours  hydrALAZINE 50 milliGRAM(s) Oral two times a day  insulin glargine Injectable (LANTUS) 14 Unit(s) SubCutaneous every morning  isosorbide   mononitrate ER Tablet (IMDUR) 120 milliGRAM(s) Oral daily  lidocaine   Patch 1 Patch Transdermal daily  methimazole 2.5 milliGRAM(s) Oral <User Schedule>  metolazone 5 milliGRAM(s) Oral before breakfast  metoprolol     tartrate 25 milliGRAM(s) Oral two times a day  mirtazapine 15 milliGRAM(s) Oral daily  polyethylene glycol 3350 17 Gram(s) Oral daily  potassium chloride    Tablet ER 20 milliEquivalent(s) Oral every 2 hours  pregabalin 25 milliGRAM(s) Oral two times a day  tiotropium 18 MICROgram(s) Capsule 1 Capsule(s) Inhalation daily  torsemide 40 milliGRAM(s) Oral before breakfast    MEDICATIONS  (PRN):  acetaminophen  Suppository. 325 milliGRAM(s) Rectal every 6 hours PRN Mild Pain (1 - 3)  ALBUTerol    0.083% 2.5 milliGRAM(s) Nebulizer every 6 hours PRN Shortness of Breath  ondansetron Injectable 4 milliGRAM(s) IV Push every 6 hours PRN Nausea      Allergies:    Cipro (Unknown)                    contrast media (iodine-based) (Unknown)                    penicillins (Unknown)                    shellfish (Her throat closes)                    Valium (excites rather than sedates)                    Codeine, Iodine unknown    SOCIAL HISTORY:     The patient does not drink alcohol, and she has never used illicit drugs.                                   She never smoked but she had second hand smoke exposure playing bingo                                                                                                                                                                                                                                                                                        9.9    9.7   )-----------( 500      ( 2017 08:31 )             33.5     PT/INR - ( 2017 08:31 )   PT: 17.6 sec;   INR: 1.59 ratio       PTT - ( 2017 08:06 )  PTT:42.5 sec        141  |  96<L>  |  38.0<H>  ----------------------------<  100  3.4<L>   |  36.0<H>  |  1.38<H>    Ca    8.9      2017 08:29  Phos  3.4       Mg     2.1         TPro  6.2<L>  /  Alb  3.1<L>  /  TBili  0.3<L>  /  DBili  0.1  /  AST  17  /  ALT  11  /  AlkPhos  87      [id number]  Atrial fibrillation  Left axis deviation  Left bundle branch block  Abnormal ECG    CHEST X-RAY  2017   FINDINGS:    There is lobulated left-sided cardiac contour on the current study.   Consider CT imaging to rule out any possibility of a cardiac aneurysm.   Visualized lung fields are grossly clear. Overall cardiac silhouette is   magnified. No significant bony abnormality is seen. There is evidence of   prior left upper quadrant abdominal surgery, possibly a splenectomy.  IMPRESSION:   New lobulated left cardiac contour as described above.    TT ECHO:  None    CT ABDOMEN & PELVIS  2017  FINDINGS:    No evidence of mediastinal or hilar lymphadenopathy. No evidence of   pleural or pericardial effusion. Cardiomegaly is noted.  Mild atelectasis in the lingula and left lower lobe. The right lung is   clear.  Status post lobectomy.  Pancreatic calcifications are identified consistent with chronic   pancreatitis. There is marked atrophy of the pancreatic parenchyma.  No evidence of retroperitoneal or pelvic lymphadenopathy.  The bladder is unremarkable. Status post hysterectomy.  No destructive metastatic lesions are identified.  IMPRESSION:   Evidence of chronic pancreatitis.  No evidence of metastatic disease in the chest abdomen or pelvis.      ASA # =4     Mallampati # = 2

## 2017-11-19 LAB
ANION GAP SERPL CALC-SCNC: 11 MMOL/L — SIGNIFICANT CHANGE UP (ref 5–17)
BUN SERPL-MCNC: 43 MG/DL — HIGH (ref 8–20)
CALCIUM SERPL-MCNC: 9 MG/DL — SIGNIFICANT CHANGE UP (ref 8.6–10.2)
CHLORIDE SERPL-SCNC: 94 MMOL/L — LOW (ref 98–107)
CO2 SERPL-SCNC: 33 MMOL/L — HIGH (ref 22–29)
CREAT SERPL-MCNC: 1.51 MG/DL — HIGH (ref 0.5–1.3)
FERRITIN SERPL-MCNC: 11.2 NG/ML — SIGNIFICANT CHANGE UP (ref 11–306)
GLUCOSE BLDC GLUCOMTR-MCNC: 105 MG/DL — HIGH (ref 70–99)
GLUCOSE BLDC GLUCOMTR-MCNC: 121 MG/DL — HIGH (ref 70–99)
GLUCOSE BLDC GLUCOMTR-MCNC: 142 MG/DL — HIGH (ref 70–99)
GLUCOSE SERPL-MCNC: 101 MG/DL — SIGNIFICANT CHANGE UP (ref 70–115)
HCT VFR BLD CALC: 33.4 % — LOW (ref 37–47)
HGB BLD-MCNC: 10.1 G/DL — LOW (ref 12–16)
IRON SATN MFR SERPL: 17 UG/DL — LOW (ref 37–145)
IRON SATN MFR SERPL: 4 % — LOW (ref 14–50)
MAGNESIUM SERPL-MCNC: 2.1 MG/DL — SIGNIFICANT CHANGE UP (ref 1.6–2.6)
MCHC RBC-ENTMCNC: 21.7 PG — LOW (ref 27–31)
MCHC RBC-ENTMCNC: 30.2 G/DL — LOW (ref 32–36)
MCV RBC AUTO: 71.8 FL — LOW (ref 81–99)
PHOSPHATE SERPL-MCNC: 3.6 MG/DL — SIGNIFICANT CHANGE UP (ref 2.4–4.7)
PLATELET # BLD AUTO: 457 K/UL — HIGH (ref 150–400)
POTASSIUM SERPL-MCNC: 3.6 MMOL/L — SIGNIFICANT CHANGE UP (ref 3.5–5.3)
POTASSIUM SERPL-SCNC: 3.6 MMOL/L — SIGNIFICANT CHANGE UP (ref 3.5–5.3)
RBC # BLD: 4.65 M/UL — SIGNIFICANT CHANGE UP (ref 4.4–5.2)
RBC # FLD: 19.1 % — HIGH (ref 11–15.6)
SODIUM SERPL-SCNC: 138 MMOL/L — SIGNIFICANT CHANGE UP (ref 135–145)
TIBC SERPL-MCNC: 415 UG/DL — SIGNIFICANT CHANGE UP (ref 220–430)
TRANSFERRIN SERPL-MCNC: 290 MG/DL — SIGNIFICANT CHANGE UP (ref 192–382)
WBC # BLD: 10.1 K/UL — SIGNIFICANT CHANGE UP (ref 4.8–10.8)
WBC # FLD AUTO: 10.1 K/UL — SIGNIFICANT CHANGE UP (ref 4.8–10.8)

## 2017-11-19 PROCEDURE — 93010 ELECTROCARDIOGRAM REPORT: CPT

## 2017-11-19 RX ORDER — SODIUM CHLORIDE 9 MG/ML
1000 INJECTION INTRAMUSCULAR; INTRAVENOUS; SUBCUTANEOUS
Qty: 0 | Refills: 0 | Status: DISCONTINUED | OUTPATIENT
Start: 2017-11-19 | End: 2017-11-20

## 2017-11-19 RX ORDER — COLCHICINE 0.6 MG
0.6 TABLET ORAL
Qty: 0 | Refills: 0 | Status: COMPLETED | OUTPATIENT
Start: 2017-11-19 | End: 2017-11-20

## 2017-11-19 RX ORDER — ACETAMINOPHEN 500 MG
650 TABLET ORAL EVERY 6 HOURS
Qty: 0 | Refills: 0 | Status: DISCONTINUED | OUTPATIENT
Start: 2017-11-19 | End: 2017-11-26

## 2017-11-19 RX ORDER — ALVIMOPAN 12 MG/1
12 CAPSULE ORAL ONCE
Qty: 0 | Refills: 0 | Status: DISCONTINUED | OUTPATIENT
Start: 2017-11-20 | End: 2017-11-27

## 2017-11-19 RX ADMIN — ALBUTEROL 2.5 MILLIGRAM(S): 90 AEROSOL, METERED ORAL at 14:33

## 2017-11-19 RX ADMIN — Medication 0.6 MILLIGRAM(S): at 18:36

## 2017-11-19 RX ADMIN — LIDOCAINE 1 PATCH: 4 CREAM TOPICAL at 15:13

## 2017-11-19 RX ADMIN — Medication 40 MILLIGRAM(S): at 05:56

## 2017-11-19 RX ADMIN — Medication 500 MILLIGRAM(S): at 21:30

## 2017-11-19 RX ADMIN — Medication 650 MILLIGRAM(S): at 02:00

## 2017-11-19 RX ADMIN — Medication 1 CAPSULE(S): at 18:47

## 2017-11-19 RX ADMIN — Medication 240 MILLIGRAM(S): at 05:23

## 2017-11-19 RX ADMIN — SODIUM CHLORIDE 50 MILLILITER(S): 9 INJECTION INTRAMUSCULAR; INTRAVENOUS; SUBCUTANEOUS at 21:28

## 2017-11-19 RX ADMIN — Medication 1 TABLET(S): at 05:25

## 2017-11-19 RX ADMIN — NEOMYCIN SULFATE 1000 MILLIGRAM(S): 500 TABLET ORAL at 20:41

## 2017-11-19 RX ADMIN — TIOTROPIUM BROMIDE 1 CAPSULE(S): 18 CAPSULE ORAL; RESPIRATORY (INHALATION) at 09:02

## 2017-11-19 RX ADMIN — Medication 1 CAPSULE(S): at 12:15

## 2017-11-19 RX ADMIN — SODIUM CHLORIDE 50 MILLILITER(S): 9 INJECTION INTRAMUSCULAR; INTRAVENOUS; SUBCUTANEOUS at 18:41

## 2017-11-19 RX ADMIN — Medication 500 MILLIGRAM(S): at 15:12

## 2017-11-19 RX ADMIN — Medication 4000 MILLILITER(S): at 10:04

## 2017-11-19 RX ADMIN — Medication 1 TABLET(S): at 18:35

## 2017-11-19 RX ADMIN — NEOMYCIN SULFATE 1000 MILLIGRAM(S): 500 TABLET ORAL at 23:02

## 2017-11-19 RX ADMIN — Medication 25 MILLIGRAM(S): at 18:36

## 2017-11-19 RX ADMIN — HEPARIN SODIUM 5000 UNIT(S): 5000 INJECTION INTRAVENOUS; SUBCUTANEOUS at 05:23

## 2017-11-19 RX ADMIN — Medication 1 CAPSULE(S): at 10:04

## 2017-11-19 RX ADMIN — Medication 25 MILLIGRAM(S): at 05:23

## 2017-11-19 RX ADMIN — Medication 500 MILLIGRAM(S): at 18:36

## 2017-11-19 RX ADMIN — Medication 650 MILLIGRAM(S): at 01:04

## 2017-11-19 RX ADMIN — Medication 50 MILLIGRAM(S): at 18:36

## 2017-11-19 RX ADMIN — HEPARIN SODIUM 5000 UNIT(S): 5000 INJECTION INTRAVENOUS; SUBCUTANEOUS at 18:36

## 2017-11-19 RX ADMIN — MIRTAZAPINE 15 MILLIGRAM(S): 45 TABLET, ORALLY DISINTEGRATING ORAL at 15:12

## 2017-11-19 RX ADMIN — NEOMYCIN SULFATE 1000 MILLIGRAM(S): 500 TABLET ORAL at 18:35

## 2017-11-19 RX ADMIN — Medication 500 MILLIGRAM(S): at 15:13

## 2017-11-19 RX ADMIN — ISOSORBIDE MONONITRATE 120 MILLIGRAM(S): 60 TABLET, EXTENDED RELEASE ORAL at 15:11

## 2017-11-19 RX ADMIN — INSULIN GLARGINE 14 UNIT(S): 100 INJECTION, SOLUTION SUBCUTANEOUS at 08:57

## 2017-11-19 RX ADMIN — Medication 25 MILLIGRAM(S): at 18:41

## 2017-11-19 RX ADMIN — Medication 50 MILLIGRAM(S): at 05:22

## 2017-11-19 NOTE — PROGRESS NOTE ADULT - SUBJECTIVE AND OBJECTIVE BOX
INTERVAL HPI/OVERNIGHT EVENTS/SUBJECTIVE:  89yF, direct admit for colon resection scheduled for 11/20. Seen and examined at bedside. Clearances obtained.  Patient without complaints.     Vital Signs Last 24 Hrs  T(C): 36.5 (19 Nov 2017 07:40), Max: 37.2 (18 Nov 2017 15:49)  T(F): 97.7 (19 Nov 2017 07:40), Max: 99 (18 Nov 2017 15:49)  HR: 129 (19 Nov 2017 07:40) (66 - 129)  BP: 120/55 (19 Nov 2017 07:40) (112/55 - 125/63)  BP(mean): --  RR: 20 (19 Nov 2017 07:40) (18 - 20)  SpO2: 99% (19 Nov 2017 07:40) (96% - 99%)      MEDICATIONS  (STANDING):  amylase/lipase/protease  (CREON  6,000 Units) 1 Capsule(s) Oral three times a day with meals  ascorbic acid 500 milliGRAM(s) Oral daily  calcium carbonate 1250 mG + Vitamin D (OsCal 500 + D) 1 Tablet(s) Oral two times a day  diltiazem    milliGRAM(s) Oral daily  heparin  Injectable 5000 Unit(s) SubCutaneous every 12 hours  hydrALAZINE 50 milliGRAM(s) Oral two times a day  insulin glargine Injectable (LANTUS) 14 Unit(s) SubCutaneous every morning  isosorbide   mononitrate ER Tablet (IMDUR) 120 milliGRAM(s) Oral daily  lidocaine   Patch 1 Patch Transdermal daily  methimazole 2.5 milliGRAM(s) Oral <User Schedule>  metolazone 5 milliGRAM(s) Oral before breakfast  metoprolol     tartrate 25 milliGRAM(s) Oral two times a day  mirtazapine 15 milliGRAM(s) Oral daily  polyethylene glycol 3350 17 Gram(s) Oral daily  pregabalin 25 milliGRAM(s) Oral two times a day  tiotropium 18 MICROgram(s) Capsule 1 Capsule(s) Inhalation daily  torsemide 40 milliGRAM(s) Oral before breakfast    MEDICATIONS  (PRN):  acetaminophen  Suppository. 325 milliGRAM(s) Rectal every 6 hours PRN Mild Pain (1 - 3)  ALBUTerol    0.083% 2.5 milliGRAM(s) Nebulizer every 6 hours PRN Shortness of Breath  ondansetron Injectable 4 milliGRAM(s) IV Push every 6 hours PRN Nausea    MISC:     PHYSICAL EXAM:    Constitutional: NAD, resting comfortably, WNWD, Hard of hearing  Eyes: PERRLA, EOM intact   Head: NCAT  Neck: trachea midline, FROM  Respiratory: non-labored, no accessory muscle use  Cardiovascular: S1S2  Genitourinary: -thornton  Skin: warm, dry, intact       LABS                        10.1   10.1  )-----------( 457      ( 19 Nov 2017 08:07 )             33.4   11-19    138  |  94<L>  |  43.0<H>  ----------------------------<  101  3.6   |  33.0<H>  |  1.51<H>    Ca    9.0      19 Nov 2017 08:06  Phos  3.6     11-19  Mg     2.1     11-19        ASSESSMENT/PLAN:  89yFemale scheduled for OR for sigmoid resection tomorrow  -pre-op for Monday  -pain control  -monitor vitals, home meds  -appreciate cardio, renal clearance  -DASH diet  -dvt ppx  -nickels prep and golytely today  -clears. NPO at midnight

## 2017-11-19 NOTE — PROGRESS NOTE ADULT - ASSESSMENT
CRF, ARF due to pre renal azotemia due to diuretics  (Pt well known to me from office)  Colonic neoplasm scheduled for OR tomorrow  Afib; CHF  COPD/ILD  DM  - pt cleared for procedure from renal and medical standpoint (at elevated risk due to her co morbid conditions)    R hand pain/erythema: likely gout  - check serum uric acid  - will add short course of colchicine

## 2017-11-19 NOTE — PROGRESS NOTE ADULT - SUBJECTIVE AND OBJECTIVE BOX
NEPHROLOGY INTERVAL HPI/OVERNIGHT EVENTS:  pt clinically stable  no cp, n/v/d; sob at baseline  pt notes R hand pain    MEDICATIONS  (STANDING):  amylase/lipase/protease  (CREON  6,000 Units) 1 Capsule(s) Oral three times a day with meals  ascorbic acid 500 milliGRAM(s) Oral daily  calcium carbonate 1250 mG + Vitamin D (OsCal 500 + D) 1 Tablet(s) Oral two times a day  diltiazem    milliGRAM(s) Oral daily  heparin  Injectable 5000 Unit(s) SubCutaneous every 12 hours  hydrALAZINE 50 milliGRAM(s) Oral two times a day  insulin glargine Injectable (LANTUS) 14 Unit(s) SubCutaneous every morning  isosorbide   mononitrate ER Tablet (IMDUR) 120 milliGRAM(s) Oral daily  lidocaine   Patch 1 Patch Transdermal daily  methimazole 2.5 milliGRAM(s) Oral <User Schedule>  metolazone 5 milliGRAM(s) Oral before breakfast  metoprolol     tartrate 25 milliGRAM(s) Oral two times a day  metroNIDAZOLE    Tablet 500 milliGRAM(s) Oral <User Schedule>  mirtazapine 15 milliGRAM(s) Oral daily  neomycin 1000 milliGRAM(s) Oral <User Schedule>  polyethylene glycol 3350 17 Gram(s) Oral daily  polyethylene glycol/electrolyte Solution. 4000 milliLiter(s) Oral once  pregabalin 25 milliGRAM(s) Oral two times a day  tiotropium 18 MICROgram(s) Capsule 1 Capsule(s) Inhalation daily  torsemide 40 milliGRAM(s) Oral before breakfast    MEDICATIONS  (PRN):  acetaminophen   Tablet. 650 milliGRAM(s) Oral every 6 hours PRN Mild Pain (1 - 3)  ALBUTerol    0.083% 2.5 milliGRAM(s) Nebulizer every 6 hours PRN Shortness of Breath  ondansetron Injectable 4 milliGRAM(s) IV Push every 6 hours PRN Nausea      Allergies    Cipro (Unknown)  contrast media (iodine-based) (Unknown)  penicillins (Unknown)  shellfish (Unknown)  Valium (Unknown)          Vital Signs Last 24 Hrs  T(C): 36.5 (19 Nov 2017 07:40), Max: 37.2 (18 Nov 2017 15:49)  T(F): 97.7 (19 Nov 2017 07:40), Max: 99 (18 Nov 2017 15:49)  HR: 129 (19 Nov 2017 07:40) (66 - 129)  BP: 120/55 (19 Nov 2017 07:40) (112/55 - 125/63)  BP(mean): --  RR: 20 (19 Nov 2017 07:40) (18 - 20)  SpO2: 99% (19 Nov 2017 07:40) (96% - 99%)    PHYSICAL EXAM:  GENERAL: NAD, generalized weakness  HEAD:  Atraumatic, Normocephalic  EYES: EOMI, PERRLA, conjunctiva and sclera clear  ENMT: No tonsillar erythema, exudates, or enlargement; Moist mucous membranes, Good dentition, No lesions  NECK: Supple, No JVD, Normal thyroid  NERVOUS SYSTEM:  Alert & Oriented X3, intact and symmetric  CHEST/LUNG: + chronic crackles  HEART: Irr no rub  ABDOMEN: Soft, Nontender + distension + BS  EXTREMITIES:  2+ Peripheral Pulses, Tr edema; R hand with erythema and pain  LYMPH: No lymphadenopathy noted  SKIN: No rashes or lesions    LABS:                        10.1   10.1  )-----------( 457      ( 19 Nov 2017 08:07 )             33.4     11-19    138  |  94<L>  |  43.0<H>  ----------------------------<  101  3.6   |  33.0<H>  |  1.51<H>    Ca    9.0      19 Nov 2017 08:06  Phos  3.6     11-19  Mg     2.1     11-19      PT/INR - ( 18 Nov 2017 08:31 )   PT: 17.6 sec;   INR: 1.59 ratio             Magnesium, Serum: 2.1 mg/dL (11-19 @ 08:06)  Phosphorus Level, Serum: 3.6 mg/dL (11-19 @ 08:06)      RADIOLOGY & ADDITIONAL TESTS:

## 2017-11-20 ENCOUNTER — APPOINTMENT (OUTPATIENT)
Dept: COLORECTAL SURGERY | Facility: HOSPITAL | Age: 82
End: 2017-11-20
Payer: MEDICARE

## 2017-11-20 LAB
ALBUMIN SERPL ELPH-MCNC: 3.2 G/DL — LOW (ref 3.3–5.2)
ALBUMIN SERPL ELPH-MCNC: 3.4 G/DL — SIGNIFICANT CHANGE UP (ref 3.3–5.2)
ALP SERPL-CCNC: 100 U/L — SIGNIFICANT CHANGE UP (ref 40–120)
ALP SERPL-CCNC: 99 U/L — SIGNIFICANT CHANGE UP (ref 40–120)
ALT FLD-CCNC: 13 U/L — SIGNIFICANT CHANGE UP
ALT FLD-CCNC: 13 U/L — SIGNIFICANT CHANGE UP
ANION GAP SERPL CALC-SCNC: 14 MMOL/L — SIGNIFICANT CHANGE UP (ref 5–17)
ANION GAP SERPL CALC-SCNC: 15 MMOL/L — SIGNIFICANT CHANGE UP (ref 5–17)
ANISOCYTOSIS BLD QL: SLIGHT — SIGNIFICANT CHANGE UP
APPEARANCE UR: CLEAR — SIGNIFICANT CHANGE UP
APTT BLD: 33.6 SEC — SIGNIFICANT CHANGE UP (ref 27.5–37.4)
AST SERPL-CCNC: 19 U/L — SIGNIFICANT CHANGE UP
AST SERPL-CCNC: 22 U/L — SIGNIFICANT CHANGE UP
BACTERIA # UR AUTO: ABNORMAL
BASOPHILS # BLD AUTO: 0 K/UL — SIGNIFICANT CHANGE UP (ref 0–0.2)
BASOPHILS NFR BLD AUTO: 0 % — SIGNIFICANT CHANGE UP (ref 0–2)
BILIRUB SERPL-MCNC: 0.4 MG/DL — SIGNIFICANT CHANGE UP (ref 0.4–2)
BILIRUB SERPL-MCNC: 0.4 MG/DL — SIGNIFICANT CHANGE UP (ref 0.4–2)
BILIRUB UR-MCNC: NEGATIVE — SIGNIFICANT CHANGE UP
BLD GP AB SCN SERPL QL: SIGNIFICANT CHANGE UP
BUN SERPL-MCNC: 28 MG/DL — HIGH (ref 8–20)
BUN SERPL-MCNC: 29 MG/DL — HIGH (ref 8–20)
BURR CELLS BLD QL SMEAR: PRESENT — SIGNIFICANT CHANGE UP
CALCIUM SERPL-MCNC: 8.9 MG/DL — SIGNIFICANT CHANGE UP (ref 8.6–10.2)
CALCIUM SERPL-MCNC: 9.1 MG/DL — SIGNIFICANT CHANGE UP (ref 8.6–10.2)
CHLORIDE SERPL-SCNC: 93 MMOL/L — LOW (ref 98–107)
CHLORIDE SERPL-SCNC: 93 MMOL/L — LOW (ref 98–107)
CO2 SERPL-SCNC: 33 MMOL/L — HIGH (ref 22–29)
CO2 SERPL-SCNC: 34 MMOL/L — HIGH (ref 22–29)
COLOR SPEC: YELLOW — SIGNIFICANT CHANGE UP
CREAT SERPL-MCNC: 1.13 MG/DL — SIGNIFICANT CHANGE UP (ref 0.5–1.3)
CREAT SERPL-MCNC: 1.2 MG/DL — SIGNIFICANT CHANGE UP (ref 0.5–1.3)
CRP SERPL-MCNC: 4 MG/DL — HIGH (ref 0–0.4)
DIFF PNL FLD: ABNORMAL
EOSINOPHIL # BLD AUTO: 0 K/UL — SIGNIFICANT CHANGE UP (ref 0–0.5)
EOSINOPHIL NFR BLD AUTO: 2 % — SIGNIFICANT CHANGE UP (ref 0–5)
GLUCOSE BLDC GLUCOMTR-MCNC: 115 MG/DL — HIGH (ref 70–99)
GLUCOSE BLDC GLUCOMTR-MCNC: 118 MG/DL — HIGH (ref 70–99)
GLUCOSE BLDC GLUCOMTR-MCNC: 84 MG/DL — SIGNIFICANT CHANGE UP (ref 70–99)
GLUCOSE BLDC GLUCOMTR-MCNC: 93 MG/DL — SIGNIFICANT CHANGE UP (ref 70–99)
GLUCOSE BLDC GLUCOMTR-MCNC: 94 MG/DL — SIGNIFICANT CHANGE UP (ref 70–99)
GLUCOSE SERPL-MCNC: 111 MG/DL — SIGNIFICANT CHANGE UP (ref 70–115)
GLUCOSE SERPL-MCNC: 118 MG/DL — HIGH (ref 70–115)
GLUCOSE UR QL: NEGATIVE MG/DL — SIGNIFICANT CHANGE UP
HCT VFR BLD CALC: 33.8 % — LOW (ref 37–47)
HCT VFR BLD CALC: 34.4 % — LOW (ref 37–47)
HGB BLD-MCNC: 10.3 G/DL — LOW (ref 12–16)
HGB BLD-MCNC: 10.5 G/DL — LOW (ref 12–16)
HYPOCHROMIA BLD QL: SLIGHT — SIGNIFICANT CHANGE UP
INR BLD: 1.16 RATIO — SIGNIFICANT CHANGE UP (ref 0.88–1.16)
KETONES UR-MCNC: NEGATIVE — SIGNIFICANT CHANGE UP
LACTATE BLDV-MCNC: 1.8 MMOL/L — SIGNIFICANT CHANGE UP (ref 0.5–2)
LEUKOCYTE ESTERASE UR-ACNC: ABNORMAL
LYMPHOCYTES # BLD AUTO: 16 % — LOW (ref 20–55)
LYMPHOCYTES # BLD AUTO: 2.9 K/UL — SIGNIFICANT CHANGE UP (ref 1–4.8)
MAGNESIUM SERPL-MCNC: 1.8 MG/DL — SIGNIFICANT CHANGE UP (ref 1.6–2.6)
MAGNESIUM SERPL-MCNC: 1.9 MG/DL — SIGNIFICANT CHANGE UP (ref 1.6–2.6)
MCHC RBC-ENTMCNC: 21.9 PG — LOW (ref 27–31)
MCHC RBC-ENTMCNC: 21.9 PG — LOW (ref 27–31)
MCHC RBC-ENTMCNC: 30.5 G/DL — LOW (ref 32–36)
MCHC RBC-ENTMCNC: 30.5 G/DL — LOW (ref 32–36)
MCV RBC AUTO: 71.7 FL — LOW (ref 81–99)
MCV RBC AUTO: 71.9 FL — LOW (ref 81–99)
MICROCYTES BLD QL: SLIGHT — SIGNIFICANT CHANGE UP
MONOCYTES # BLD AUTO: 1.8 K/UL — HIGH (ref 0–0.8)
MONOCYTES NFR BLD AUTO: 11 % — HIGH (ref 3–10)
NEUTROPHILS # BLD AUTO: 12.1 K/UL — HIGH (ref 1.8–8)
NEUTROPHILS NFR BLD AUTO: 70 % — SIGNIFICANT CHANGE UP (ref 37–73)
NEUTS BAND # BLD: 1 % — SIGNIFICANT CHANGE UP (ref 0–8)
NITRITE UR-MCNC: NEGATIVE — SIGNIFICANT CHANGE UP
OVALOCYTES BLD QL SMEAR: SLIGHT — SIGNIFICANT CHANGE UP
PH UR: 7 — SIGNIFICANT CHANGE UP (ref 5–8)
PHOSPHATE SERPL-MCNC: 2.9 MG/DL — SIGNIFICANT CHANGE UP (ref 2.4–4.7)
PLAT MORPH BLD: NORMAL — SIGNIFICANT CHANGE UP
PLATELET # BLD AUTO: 472 K/UL — HIGH (ref 150–400)
PLATELET # BLD AUTO: 497 K/UL — HIGH (ref 150–400)
POIKILOCYTOSIS BLD QL AUTO: SLIGHT — SIGNIFICANT CHANGE UP
POTASSIUM SERPL-MCNC: 2.8 MMOL/L — CRITICAL LOW (ref 3.5–5.3)
POTASSIUM SERPL-MCNC: 3.1 MMOL/L — LOW (ref 3.5–5.3)
POTASSIUM SERPL-SCNC: 2.8 MMOL/L — CRITICAL LOW (ref 3.5–5.3)
POTASSIUM SERPL-SCNC: 3.1 MMOL/L — LOW (ref 3.5–5.3)
PROCALCITONIN SERPL-MCNC: 0.1 NG/ML — HIGH (ref 0–0.04)
PROT SERPL-MCNC: 6.7 G/DL — SIGNIFICANT CHANGE UP (ref 6.6–8.7)
PROT SERPL-MCNC: 6.8 G/DL — SIGNIFICANT CHANGE UP (ref 6.6–8.7)
PROT UR-MCNC: NEGATIVE MG/DL — SIGNIFICANT CHANGE UP
PROTHROM AB SERPL-ACNC: 12.8 SEC — HIGH (ref 9.8–12.7)
RBC # BLD: 4.7 M/UL — SIGNIFICANT CHANGE UP (ref 4.4–5.2)
RBC # BLD: 4.8 M/UL — SIGNIFICANT CHANGE UP (ref 4.4–5.2)
RBC # FLD: 18.8 % — HIGH (ref 11–15.6)
RBC # FLD: 19.1 % — HIGH (ref 11–15.6)
RBC BLD AUTO: ABNORMAL
RBC CASTS # UR COMP ASSIST: SIGNIFICANT CHANGE UP /HPF (ref 0–4)
SCHISTOCYTES BLD QL AUTO: SLIGHT — SIGNIFICANT CHANGE UP
SODIUM SERPL-SCNC: 140 MMOL/L — SIGNIFICANT CHANGE UP (ref 135–145)
SODIUM SERPL-SCNC: 142 MMOL/L — SIGNIFICANT CHANGE UP (ref 135–145)
SP GR SPEC: 1 — LOW (ref 1.01–1.02)
SPHEROCYTES BLD QL SMEAR: SLIGHT — SIGNIFICANT CHANGE UP
TARGETS BLD QL SMEAR: SIGNIFICANT CHANGE UP
URATE SERPL-MCNC: 11 MG/DL — HIGH (ref 2.4–5.7)
UROBILINOGEN FLD QL: NEGATIVE MG/DL — SIGNIFICANT CHANGE UP
WBC # BLD: 16.5 K/UL — HIGH (ref 4.8–10.8)
WBC # BLD: 16.8 K/UL — HIGH (ref 4.8–10.8)
WBC # FLD AUTO: 16.5 K/UL — HIGH (ref 4.8–10.8)
WBC # FLD AUTO: 16.8 K/UL — HIGH (ref 4.8–10.8)
WBC UR QL: ABNORMAL

## 2017-11-20 PROCEDURE — 45300 PROCTOSIGMOIDOSCOPY DX: CPT

## 2017-11-20 PROCEDURE — 93010 ELECTROCARDIOGRAM REPORT: CPT

## 2017-11-20 PROCEDURE — 38570 LAPAROSCOPY LYMPH NODE BIOP: CPT

## 2017-11-20 PROCEDURE — 71250 CT THORAX DX C-: CPT | Mod: 26

## 2017-11-20 PROCEDURE — 44213 LAP MOBIL SPLENIC FL ADD-ON: CPT

## 2017-11-20 PROCEDURE — 74176 CT ABD & PELVIS W/O CONTRAST: CPT | Mod: 26

## 2017-11-20 PROCEDURE — 71010: CPT | Mod: 26

## 2017-11-20 PROCEDURE — 44207 L COLECTOMY/COLOPROCTOSTOMY: CPT

## 2017-11-20 PROCEDURE — 74000: CPT | Mod: 26

## 2017-11-20 RX ORDER — SODIUM CHLORIDE 9 MG/ML
1000 INJECTION, SOLUTION INTRAVENOUS ONCE
Qty: 0 | Refills: 0 | Status: DISCONTINUED | OUTPATIENT
Start: 2017-11-20 | End: 2017-11-20

## 2017-11-20 RX ORDER — ACETAMINOPHEN 500 MG
1000 TABLET ORAL ONCE
Qty: 0 | Refills: 0 | Status: DISCONTINUED | OUTPATIENT
Start: 2017-11-20 | End: 2017-11-20

## 2017-11-20 RX ORDER — CEFTRIAXONE 500 MG/1
INJECTION, POWDER, FOR SOLUTION INTRAMUSCULAR; INTRAVENOUS
Qty: 0 | Refills: 0 | Status: DISCONTINUED | OUTPATIENT
Start: 2017-11-20 | End: 2017-11-26

## 2017-11-20 RX ORDER — ACETAMINOPHEN 500 MG
650 TABLET ORAL EVERY 6 HOURS
Qty: 0 | Refills: 0 | Status: DISCONTINUED | OUTPATIENT
Start: 2017-11-20 | End: 2017-11-26

## 2017-11-20 RX ORDER — CEFTRIAXONE 500 MG/1
1 INJECTION, POWDER, FOR SOLUTION INTRAMUSCULAR; INTRAVENOUS ONCE
Qty: 0 | Refills: 0 | Status: COMPLETED | OUTPATIENT
Start: 2017-11-20 | End: 2017-11-20

## 2017-11-20 RX ORDER — ALLOPURINOL 300 MG
100 TABLET ORAL DAILY
Qty: 0 | Refills: 0 | Status: DISCONTINUED | OUTPATIENT
Start: 2017-11-20 | End: 2017-11-27

## 2017-11-20 RX ORDER — POTASSIUM CHLORIDE 20 MEQ
20 PACKET (EA) ORAL
Qty: 0 | Refills: 0 | Status: DISCONTINUED | OUTPATIENT
Start: 2017-11-20 | End: 2017-11-20

## 2017-11-20 RX ORDER — SODIUM CHLORIDE 9 MG/ML
500 INJECTION, SOLUTION INTRAVENOUS
Qty: 0 | Refills: 0 | Status: COMPLETED | OUTPATIENT
Start: 2017-11-20 | End: 2017-11-20

## 2017-11-20 RX ORDER — SODIUM CHLORIDE 9 MG/ML
1000 INJECTION, SOLUTION INTRAVENOUS ONCE
Qty: 0 | Refills: 0 | Status: COMPLETED | OUTPATIENT
Start: 2017-11-20 | End: 2017-11-20

## 2017-11-20 RX ORDER — ACETAMINOPHEN 500 MG
1000 TABLET ORAL ONCE
Qty: 0 | Refills: 0 | Status: COMPLETED | OUTPATIENT
Start: 2017-11-20 | End: 2017-11-20

## 2017-11-20 RX ORDER — MAGNESIUM SULFATE 500 MG/ML
1 VIAL (ML) INJECTION ONCE
Qty: 0 | Refills: 0 | Status: COMPLETED | OUTPATIENT
Start: 2017-11-20 | End: 2017-11-20

## 2017-11-20 RX ORDER — POTASSIUM CHLORIDE 20 MEQ
10 PACKET (EA) ORAL
Qty: 0 | Refills: 0 | Status: COMPLETED | OUTPATIENT
Start: 2017-11-20 | End: 2017-11-20

## 2017-11-20 RX ORDER — CEFTRIAXONE 500 MG/1
1 INJECTION, POWDER, FOR SOLUTION INTRAMUSCULAR; INTRAVENOUS EVERY 24 HOURS
Qty: 0 | Refills: 0 | Status: DISCONTINUED | OUTPATIENT
Start: 2017-11-21 | End: 2017-11-26

## 2017-11-20 RX ADMIN — MIRTAZAPINE 15 MILLIGRAM(S): 45 TABLET, ORALLY DISINTEGRATING ORAL at 22:26

## 2017-11-20 RX ADMIN — Medication 1 TABLET(S): at 05:46

## 2017-11-20 RX ADMIN — Medication 25 MILLIGRAM(S): at 05:51

## 2017-11-20 RX ADMIN — LIDOCAINE 1 PATCH: 4 CREAM TOPICAL at 02:42

## 2017-11-20 RX ADMIN — Medication 500 MILLIGRAM(S): at 19:22

## 2017-11-20 RX ADMIN — SODIUM CHLORIDE 1500 MILLILITER(S): 9 INJECTION, SOLUTION INTRAVENOUS at 09:00

## 2017-11-20 RX ADMIN — Medication 50 MILLIGRAM(S): at 05:46

## 2017-11-20 RX ADMIN — HEPARIN SODIUM 5000 UNIT(S): 5000 INJECTION INTRAVENOUS; SUBCUTANEOUS at 22:25

## 2017-11-20 RX ADMIN — Medication 100 MILLIEQUIVALENT(S): at 19:36

## 2017-11-20 RX ADMIN — Medication 650 MILLIGRAM(S): at 20:15

## 2017-11-20 RX ADMIN — Medication 25 MILLIGRAM(S): at 05:46

## 2017-11-20 RX ADMIN — INSULIN GLARGINE 14 UNIT(S): 100 INJECTION, SOLUTION SUBCUTANEOUS at 10:31

## 2017-11-20 RX ADMIN — Medication 400 MILLIGRAM(S): at 11:33

## 2017-11-20 RX ADMIN — Medication 100 MILLIEQUIVALENT(S): at 11:34

## 2017-11-20 RX ADMIN — SODIUM CHLORIDE 75 MILLILITER(S): 9 INJECTION, SOLUTION INTRAVENOUS at 10:31

## 2017-11-20 RX ADMIN — Medication 650 MILLIGRAM(S): at 21:00

## 2017-11-20 RX ADMIN — Medication 100 GRAM(S): at 22:26

## 2017-11-20 RX ADMIN — Medication 0.6 MILLIGRAM(S): at 05:46

## 2017-11-20 RX ADMIN — Medication 100 MILLIEQUIVALENT(S): at 12:46

## 2017-11-20 RX ADMIN — CEFTRIAXONE 100 GRAM(S): 500 INJECTION, POWDER, FOR SOLUTION INTRAMUSCULAR; INTRAVENOUS at 10:31

## 2017-11-20 RX ADMIN — Medication 240 MILLIGRAM(S): at 05:46

## 2017-11-20 NOTE — PROGRESS NOTE ADULT - SUBJECTIVE AND OBJECTIVE BOX
code sepsis called this am, fever of 101 and wbc of 16.  Pt was scheduled for a sigmoid resection today for a colon cancer  received bowel prep yesterday.    Patient is lethargic but arousable.     Vital Signs Last 24 Hrs  T(C): 37.4 (20 Nov 2017 07:54), Max: 37.4 (20 Nov 2017 07:54)  T(F): 99.3 (20 Nov 2017 07:54), Max: 99.3 (20 Nov 2017 07:54)  HR: 101 (20 Nov 2017 07:54) (76 - 116)  BP: 117/62 (20 Nov 2017 07:54) (117/62 - 141/79)  BP(mean): --  RR: 18 (20 Nov 2017 07:54) (18 - 18)  SpO2: 98% (20 Nov 2017 07:54) (97% - 98%)    NAD  abd softly distended, NT  no rebound or guarding                          10.3   16.8  )-----------( 472      ( 20 Nov 2017 09:29 )             33.8   11-20    140  |  93<L>  |  28.0<H>  ----------------------------<  118<H>  3.1<L>   |  33.0<H>  |  1.13    Ca    8.9      20 Nov 2017 09:29  Phos  2.9     11-20  Mg     1.9     11-20    TPro  6.7  /  Alb  3.2<L>  /  TBili  0.4  /  DBili  x   /  AST  22  /  ALT  13  /  AlkPhos  99  11-20    Blood Gas Venous - Lactate (11.20.17 @ 09:27)    Blood Gas Venous - Lactate: 1.8: As of 4/5/17 the reference range for Lactic Acid, whole blood venous has  changed. mmoL/L    Urinalysis (11.20.17 @ 09:56)    Glucose Qualitative, Urine: Negative mg/dL    Blood, Urine: Small    pH Urine: 7.0    Color: Yellow    Urine Appearance: Clear    Bilirubin: Negative    Ketone - Urine: Negative    Specific Gravity: 1.005    Protein, Urine: Negative mg/dL    Urobilinogen: Negative mg/dL    Nitrite: Negative    Leukocyte Esterase Concentration: Moderate

## 2017-11-20 NOTE — CHART NOTE - NSCHARTNOTEFT_GEN_A_CORE
3 hr assessment:  Pt. seen at bedside.  lips and oral mucosa looks more hydrated now.  Pt is still sleepy but arousable and follows commands.  Lactate was 1.8, procalcitonin was 0.1 and patient got a dose of rocephin and is having electrolytes repleated.  Patient has her surgery postponed until more stable clinically.  Medical management to resume by primary team.    Daily     Daily   Vital Signs Last 24 Hrs  T(C): 37.4 (11-20-17 @ 07:54), Max: 37.4 (11-20-17 @ 07:54)  T(F): 99.3 (11-20-17 @ 07:54), Max: 99.3 (11-20-17 @ 07:54)  HR: 101 (11-20-17 @ 07:54) (76 - 116)  BP: 117/62 (11-20-17 @ 07:54) (117/62 - 141/79)  RR: 18 (11-20-17 @ 07:54) (18 - 18)  SpO2: 98% (11-20-17 @ 07:54) (97% - 98%)  I&O's Summary      I have re-evaluated the patient's fluid status and vital signs. Clinical evaluation demonstrates an appropriate response to fluid resuscitation. 3 hr assessment:  Pt. seen at bedside.  lips and oral mucosa looks more hydrated now.  Pt is still sleepy but arousable and follows commands.  Lactate was 1.8, procalcitonin was 0.1 and patient got a dose of rocephin and is having electrolytes repleated; serum Mg is 1.9 will add 1g Magnesium IVPB given patients cardiac history so that she is above 2.0.  Patient has her surgery postponed until more stable clinically.  Medical management to resume by primary team.    Daily     Daily   Vital Signs Last 24 Hrs  T(C): 37.4 (11-20-17 @ 07:54), Max: 37.4 (11-20-17 @ 07:54)  T(F): 99.3 (11-20-17 @ 07:54), Max: 99.3 (11-20-17 @ 07:54)  HR: 101 (11-20-17 @ 07:54) (76 - 116)  BP: 117/62 (11-20-17 @ 07:54) (117/62 - 141/79)  RR: 18 (11-20-17 @ 07:54) (18 - 18)  SpO2: 98% (11-20-17 @ 07:54) (97% - 98%)  I&O's Summary      I have re-evaluated the patient's fluid status and vital signs. Clinical evaluation demonstrates an appropriate response to fluid resuscitation.

## 2017-11-20 NOTE — PROGRESS NOTE ADULT - ASSESSMENT
sigmoid colon cancer  code sepsis   fever and leukocytosis  hypokelmia  dehydrated    code sepsis workup in progress. cultures  I have spoken to medical team. OR for today has been cancelled  IVF hydrate  CXR AXR ordered  full set of labs  IV rocephin ordered by medical team for suspected UTI  Will obtain CT chest abdomen and pelvis  as she had a nearly obstructiong colon mass, r/o perf?? , abscess?    Case d/w Dr. Flowers

## 2017-11-20 NOTE — CHART NOTE - NSCHARTNOTEFT_GEN_A_CORE
Code Sepsis called for Temp 101.0F, HR 76, RR 18, /67 mmHg    HPI:  88 yo female, c/o progressively enlarging abd girth over 1 yr, s/p colonoscopy 10/19/17, found to have tubular adenoma at ileocecal valve and 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 Pt denies Nausea/vomiting, diarrhea. C/o constipation. Denies fever/chills.    Pt direct admission for operative intervention by Dr Flowers on 11/20.    Pt c/o SOB/dyspnea and mild vague chest discomfort upon admission to the floor, also c/o tender inner left lower leg, also c/o LE edema L > R and coldness of LE's    Pulmonary consult - Dr Bang,    Primary care consult - Dr Barbosa    Cardiology consult - Dr Bush (16 Nov 2017 15:13)    PAST MEDICAL & SURGICAL HISTORY:  Renal insufficiency  Malignant neoplasm of descending colon  Microcytic anemia  ILD (interstitial lung disease)  Tricuspid valve insufficiency, unspecified etiology  Mitral valve insufficiency, unspecified etiology  Aortic valve insufficiency, etiology of cardiac valve disease unspecified  IDDM (insulin dependent diabetes mellitus)  COPD, mild  Atrial fibrillation, unspecified type  Diabetes  Cardiomegaly  Congestive heart failure, unspecified congestive heart failure chronicity, unspecified congestive heart failure type  H/O: hysterectomy  History of laparoscopic cholecystectomy  H/O splenectomy      Allergies    Cipro (Unknown)  contrast media (iodine-based) (Unknown)  penicillins (Unknown)  shellfish (Unknown)  Valium (Unknown)    Intolerances      MEDICATIONS  (STANDING):  alvimopan 12 milliGRAM(s) Oral once  amylase/lipase/protease  (CREON  6,000 Units) 1 Capsule(s) Oral three times a day with meals  ascorbic acid 500 milliGRAM(s) Oral daily  calcium carbonate 1250 mG + Vitamin D (OsCal 500 + D) 1 Tablet(s) Oral two times a day  cefTRIAXone   IVPB      cefTRIAXone   IVPB 1 Gram(s) IV Intermittent once  diltiazem    milliGRAM(s) Oral daily  heparin  Injectable 5000 Unit(s) SubCutaneous every 12 hours  hydrALAZINE 50 milliGRAM(s) Oral two times a day  insulin glargine Injectable (LANTUS) 14 Unit(s) SubCutaneous every morning  isosorbide   mononitrate ER Tablet (IMDUR) 120 milliGRAM(s) Oral daily  lidocaine   Patch 1 Patch Transdermal daily  methimazole 2.5 milliGRAM(s) Oral <User Schedule>  metolazone 5 milliGRAM(s) Oral before breakfast  metoprolol     tartrate 25 milliGRAM(s) Oral two times a day  mirtazapine 15 milliGRAM(s) Oral daily  multiple electrolytes Injection Type 1 Bolus 1000 milliLiter(s) IV Bolus once  multiple electrolytes Injection Type 1 Bolus 1000 milliLiter(s) IV Bolus once  polyethylene glycol 3350 17 Gram(s) Oral daily  potassium chloride  10 mEq/100 mL IVPB 10 milliEquivalent(s) IV Intermittent every 1 hour  pregabalin 25 milliGRAM(s) Oral two times a day  tiotropium 18 MICROgram(s) Capsule 1 Capsule(s) Inhalation daily  torsemide 40 milliGRAM(s) Oral before breakfast    MEDICATIONS  (PRN):  acetaminophen   Tablet 650 milliGRAM(s) Oral every 6 hours PRN For Temp greater than 38 C (100.4 F)  acetaminophen   Tablet. 650 milliGRAM(s) Oral every 6 hours PRN Mild Pain (1 - 3)  ALBUTerol    0.083% 2.5 milliGRAM(s) Nebulizer every 6 hours PRN Shortness of Breath  ondansetron Injectable 4 milliGRAM(s) IV Push every 6 hours PRN Nausea      Vital Signs   T(F): 101.0  HR: 73  BP: 118/67   RR: 18  SpO2: 98%   I&O's Summary      PHYSICAL EXAM:    GENERAL: patient is sleepy but arousable to name, oriented to name and place, will answer questions appropriately.  Mucous membranes are dry, lips are dry, white, looks clinically dehydrated.   NECK: Supple, No JVD  NERVOUS SYSTEM:  Alert & Oriented X 2,  Grossly moving all extremities, no facial droop, no focal defecits.   CHEST/LUNG: decreased air entry on left lower lung base, No rales, rhonchi, wheezing, or rubs  HEART: Regular rate and rhythm; No murmurs, rubs, or gallops  ABDOMEN: Soft, Nontender, distended but soft, no rebound, no guarding, no rigidity; Bowel sounds present  EXTREMITIES:  2+ Peripheral Pulses, No clubbing, cyanosis, or edema  SKIN: warm, turgor good,  capillary refill    <2 sec      Assessment-  1. Sepsis                                                                                                                                             Time   08:57                   Intervention-  STAT Labs- CBC, CMP, S Lactate, Blood C/S x 2, PT/ PTT/ INR, UA, Urine C/S, CXR                          Time drawn  09:15  Antibiotic -  Rocephin 1g IVPB Q24h                                                                                         Time started      OTHER antibiotic due to-     Intervention - Fluids                                                                                                                          Time started  Crystalloid fluid( NS; Ringer's; Plasmalyte)  Rate (30ml/Kg in 500ml boluses Q 15 mins until goal)  Total Volume-    NOT given fluids due to-     If Lactate >2 repeat after fluid bolus  -----------------------------------------------------------------------------------------------------------------------------------------------------------------------------------  Assessment-  2. Severe Sepsis (Decreased SBP>40; Lactate >2; organ dysfxn)                                              Time    Intervention                                                                                                                                       Time started  1) Crystalloid fluid( NS; Ringer's; Plasmalyte)  Rate (30ml/Kg in 500ml boluses Q 15 mins until goal)  Total Volume-    Not given fluids due to-     2) Antibiotic                                                                                                                                            Time started  ( Combination therapy- Aminoglycosides/ Cipro/ Aztreonam) + (Cephalosporin/ Clinda/ Dapto/ Vanco/ Linezolid/ Macrolide/ PC)    Repeat Lactate                                                                                                                                  Time drawn    measure BP Q 30 mins after each bolus  -----------------------------------------------------------------------------------------------------------------------------------------------------------------------------------  Assessment-  3. Septic shock (Lactate >4; SBP<90; organ dysfxn; persistent hypotension)                        Time    Intervention  Fluids                                                                                                                                                  Time started  Crystalloid fluid( NS; Ringer's; Plasmalyte)  Rate (30ml/Kg in 500ml boluses Q 15 mins until goal)  Total Volume-    NOT given fluids due to-     Antibiotic                                                                                                                                            Time started  ( Combination therapy- Aminoglycosides/ Cipro/ Aztreonam) + (Cephalosporin/ Clinda/ Dapto/ Vanco/ Linezolid/ Macrolide/ PC)    Vasopressor                                                                                                                                        Time started  Drug/ Rate    Additional Fluids                                                                                                                                 Time started  Crystalloid fluid( NS; Ringer's; Plasmalyte)  Rate (30ml/Kg in 500ml boluses Q 15 mins until goal)  Total Volume    Repeat Lactate                                                                                                                                   Time drawn  ----------------------------------------------------------------------------------------------------------------------------------------------------------------------------------- Code Sepsis called for Temp 101.0F, HR 76, RR 18, /67 mmHg    HPI:  88 yo female, c/o progressively enlarging abd girth over 1 yr, s/p colonoscopy 10/19/17, found to have tubular adenoma at ileocecal valve and 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 Pt denies Nausea/vomiting, diarrhea. C/o constipation. Denies fever/chills.    Pt direct admission for operative intervention by Dr Flowers on 11/20.    Pt c/o SOB/dyspnea and mild vague chest discomfort upon admission to the floor, also c/o tender inner left lower leg, also c/o LE edema L > R and coldness of LE's    ****Code Sepsis called for elevated rectal temp and WBCs.  Patient w known history of tubular adenoma and villous adenoma w high grade dysplasia/carcinoma.  Patient sleepy but arousable.  Oriented to place and name but unsure of the time.  Patient is conversive when asked questions.  Denies chest pain, sob, abdominal pains.    Pulmonary consult - Dr Bang,    Primary care consult - Dr Barbosa    Cardiology consult - Dr Bush (16 Nov 2017 15:13)    PAST MEDICAL & SURGICAL HISTORY:  Renal insufficiency  Malignant neoplasm of descending colon  Microcytic anemia  ILD (interstitial lung disease)  Tricuspid valve insufficiency, unspecified etiology  Mitral valve insufficiency, unspecified etiology  Aortic valve insufficiency, etiology of cardiac valve disease unspecified  IDDM (insulin dependent diabetes mellitus)  COPD, mild  Atrial fibrillation, unspecified type  Diabetes  Cardiomegaly  Congestive heart failure, unspecified congestive heart failure chronicity, unspecified congestive heart failure type  H/O: hysterectomy  History of laparoscopic cholecystectomy  H/O splenectomy      Allergies    Cipro (Unknown)  contrast media (iodine-based) (Unknown)  penicillins (Unknown)  shellfish (Unknown)  Valium (Unknown)    Intolerances      MEDICATIONS  (STANDING):  alvimopan 12 milliGRAM(s) Oral once  amylase/lipase/protease  (CREON  6,000 Units) 1 Capsule(s) Oral three times a day with meals  ascorbic acid 500 milliGRAM(s) Oral daily  calcium carbonate 1250 mG + Vitamin D (OsCal 500 + D) 1 Tablet(s) Oral two times a day  cefTRIAXone   IVPB      cefTRIAXone   IVPB 1 Gram(s) IV Intermittent once  diltiazem    milliGRAM(s) Oral daily  heparin  Injectable 5000 Unit(s) SubCutaneous every 12 hours  hydrALAZINE 50 milliGRAM(s) Oral two times a day  insulin glargine Injectable (LANTUS) 14 Unit(s) SubCutaneous every morning  isosorbide   mononitrate ER Tablet (IMDUR) 120 milliGRAM(s) Oral daily  lidocaine   Patch 1 Patch Transdermal daily  methimazole 2.5 milliGRAM(s) Oral <User Schedule>  metolazone 5 milliGRAM(s) Oral before breakfast  metoprolol     tartrate 25 milliGRAM(s) Oral two times a day  mirtazapine 15 milliGRAM(s) Oral daily  multiple electrolytes Injection Type 1 Bolus 1000 milliLiter(s) IV Bolus once  multiple electrolytes Injection Type 1 Bolus 1000 milliLiter(s) IV Bolus once  polyethylene glycol 3350 17 Gram(s) Oral daily  potassium chloride  10 mEq/100 mL IVPB 10 milliEquivalent(s) IV Intermittent every 1 hour  pregabalin 25 milliGRAM(s) Oral two times a day  tiotropium 18 MICROgram(s) Capsule 1 Capsule(s) Inhalation daily  torsemide 40 milliGRAM(s) Oral before breakfast    MEDICATIONS  (PRN):  acetaminophen   Tablet 650 milliGRAM(s) Oral every 6 hours PRN For Temp greater than 38 C (100.4 F)  acetaminophen   Tablet. 650 milliGRAM(s) Oral every 6 hours PRN Mild Pain (1 - 3)  ALBUTerol    0.083% 2.5 milliGRAM(s) Nebulizer every 6 hours PRN Shortness of Breath  ondansetron Injectable 4 milliGRAM(s) IV Push every 6 hours PRN Nausea      Vital Signs   T(F): 101.0  HR: 73  BP: 118/67   RR: 18  SpO2: 98%   I&O's Summary      PHYSICAL EXAM:    GENERAL: patient is sleepy but arousable to name, oriented to name and place, will answer questions appropriately.  Mucous membranes are dry, lips are dry, white, looks clinically dehydrated.   NECK: Supple, No JVD  NERVOUS SYSTEM:  Alert & Oriented X 2,  Grossly moving all extremities, no facial droop, no focal defecits.   CHEST/LUNG: decreased air entry on left lower lung base, No rales, rhonchi, wheezing, or rubs  HEART: Regular rate and rhythm; No murmurs, rubs, or gallops  ABDOMEN: Soft, Nontender, distended but soft, no rebound, no guarding, no rigidity; Bowel sounds present  EXTREMITIES:  2+ Peripheral Pulses, No clubbing, cyanosis, or edema  SKIN: warm, turgor good,  capillary refill    <2 sec      Assessment-  1. Sepsis; likely 2/2 UTI                                                                                                           Time   08:57                   Intervention-  STAT Labs- CBC, CMP, S Lactate, Blood C/S x 2, PT/ PTT/ INR, UA, Urine C/S, CXR                          Time drawn  09:15  Antibiotic -  Rocephin 1g IVPB Q24h                                                                                         Time started      OTHER antibiotic due to-     Intervention - Fluids                                                                                                                          Time started 9: 18  Crystalloid fluid(Plasmalyte)  Rate (30ml/Kg in 500ml boluses Q 15 mins until goal)  Total Volume- 2L bolus  If Lactate >2 repeat after fluid bolus    measure BP Q 30 mins after each bolus    Plan:  Sepsis w/u; cbc, cmp, lactate, procalcitonin, crp, magnesium, phosphorous, blood Cx, urin Cx, CXR, Abd-XR  Plasma-lyte 2L bolus  tylenol 650mg PO for fever  Lactate 1.8; will stop fluid bolus after 1L but continue with mantenance fluids of plasma lyte of 500cc at 75mL/hr x 1 dose as patient is clinically dehydrated; likely 2/2 to multifactorial UTI and bowel prep for surgery that cause large amount of bowel movement.  Rocephin 1g IVPB Qd for possible UTI; pending UA and urine Culture.  Electrolyte replacement; Potassium IV and PO to be above 4.0 given her cardiac history.  Discussed w primary surgery team who was at bedside.  Agree w plan and will postpone surgery.  NPO discontinue for now.

## 2017-11-20 NOTE — PROGRESS NOTE ADULT - ASSESSMENT
CRF, ARF due to pre renal azotemia due to diuretics  (Pt well known to me from office)  Colonic neoplasm scheduled for OR tomorrow  Afib; CHF  COPD/ILD  DM  - supplement K+  - pt cleared for procedure from renal and medical standpoint (at elevated risk due to her co morbid conditions)    R hand pain/erythema: likely gout==> improved clinically  Hyperuricemia noted (partially due to diuretics)  - hold metolazone for now  - add allopurinol  - further doses of colchicine as needed

## 2017-11-20 NOTE — PROGRESS NOTE ADULT - SUBJECTIVE AND OBJECTIVE BOX
NEPHROLOGY INTERVAL HPI/OVERNIGHT EVENTS:  pt clinically stable when seen earlier  no acute distress  R hand pain improved    MEDICATIONS  (STANDING):  alvimopan 12 milliGRAM(s) Oral once  amylase/lipase/protease  (CREON  6,000 Units) 1 Capsule(s) Oral three times a day with meals  ascorbic acid 500 milliGRAM(s) Oral daily  calcium carbonate 1250 mG + Vitamin D (OsCal 500 + D) 1 Tablet(s) Oral two times a day  cefTRIAXone   IVPB      cefTRIAXone   IVPB 1 Gram(s) IV Intermittent once  diltiazem    milliGRAM(s) Oral daily  heparin  Injectable 5000 Unit(s) SubCutaneous every 12 hours  hydrALAZINE 50 milliGRAM(s) Oral two times a day  insulin glargine Injectable (LANTUS) 14 Unit(s) SubCutaneous every morning  isosorbide   mononitrate ER Tablet (IMDUR) 120 milliGRAM(s) Oral daily  lidocaine   Patch 1 Patch Transdermal daily  methimazole 2.5 milliGRAM(s) Oral <User Schedule>  metolazone 5 milliGRAM(s) Oral before breakfast  metoprolol     tartrate 25 milliGRAM(s) Oral two times a day  mirtazapine 15 milliGRAM(s) Oral daily  multiple electrolytes Injection Type 1 Bolus 1000 milliLiter(s) IV Bolus once  multiple electrolytes Injection Type 1 Bolus 1000 milliLiter(s) IV Bolus once  polyethylene glycol 3350 17 Gram(s) Oral daily  potassium chloride  10 mEq/100 mL IVPB 10 milliEquivalent(s) IV Intermittent every 1 hour  pregabalin 25 milliGRAM(s) Oral two times a day  tiotropium 18 MICROgram(s) Capsule 1 Capsule(s) Inhalation daily  torsemide 40 milliGRAM(s) Oral before breakfast    MEDICATIONS  (PRN):  acetaminophen   Tablet 650 milliGRAM(s) Oral every 6 hours PRN For Temp greater than 38 C (100.4 F)  acetaminophen   Tablet. 650 milliGRAM(s) Oral every 6 hours PRN Mild Pain (1 - 3)  ALBUTerol    0.083% 2.5 milliGRAM(s) Nebulizer every 6 hours PRN Shortness of Breath  ondansetron Injectable 4 milliGRAM(s) IV Push every 6 hours PRN Nausea      Allergies    Cipro (Unknown)  contrast media (iodine-based) (Unknown)  penicillins (Unknown)  shellfish (Unknown)  Valium (Unknown)          Vital Signs Last 24 Hrs  T(C): 37.4 (20 Nov 2017 07:54), Max: 37.4 (20 Nov 2017 07:54)  T(F): 99.3 (20 Nov 2017 07:54), Max: 99.3 (20 Nov 2017 07:54)  HR: 101 (20 Nov 2017 07:54) (76 - 116)  BP: 117/62 (20 Nov 2017 07:54) (117/62 - 141/79)  BP(mean): --  RR: 18 (20 Nov 2017 07:54) (18 - 18)  SpO2: 98% (20 Nov 2017 07:54) (97% - 98%)    PHYSICAL EXAM:  GENERAL: NAD, generalized weakness  HEAD:  Atraumatic, Normocephalic  EYES: EOMI, PERRLA, conjunctiva and sclera clear  ENMT: No tonsillar erythema, exudates, or enlargement; Moist mucous membranes, Good dentition, No lesions  NECK: Supple, No JVD, Normal thyroid  NERVOUS SYSTEM:  Alert & Oriented X3, intact and symmetric  CHEST/LUNG: + chronic crackles  HEART: Irr no rub  ABDOMEN: Soft, Nontender + distension + BS  EXTREMITIES:  2+ Peripheral Pulses, Tr edema; R hand with diminished erythema and  minimal pain  LYMPH: No lymphadenopathy noted  SKIN: No rashes or lesions    LABS:                        10.5   16.5  )-----------( 497      ( 20 Nov 2017 08:13 )             34.4     11-20    140  |  93<L>  |  28.0<H>  ----------------------------<  118<H>  3.1<L>   |  33.0<H>  |  1.13    Ca    8.9      20 Nov 2017 09:29  Phos  3.6     11-19  Mg     1.8     11-20    TPro  6.7  /  Alb  3.2<L>  /  TBili  0.4  /  DBili  x   /  AST  22  /  ALT  13  /  AlkPhos  99  11-20    PT/INR - ( 20 Nov 2017 08:13 )   PT: 12.8 sec;   INR: 1.16 ratio         PTT - ( 20 Nov 2017 08:13 )  PTT:33.6 sec    Magnesium, Serum: 1.8 mg/dL (11-20 @ 08:13)    Uric Acid, Serum: 11.0 mg/dL (11.20.17 @ 08:13)        RADIOLOGY & ADDITIONAL TESTS:

## 2017-11-21 LAB
ANION GAP SERPL CALC-SCNC: 11 MMOL/L — SIGNIFICANT CHANGE UP (ref 5–17)
ANION GAP SERPL CALC-SCNC: 14 MMOL/L — SIGNIFICANT CHANGE UP (ref 5–17)
BUN SERPL-MCNC: 22 MG/DL — HIGH (ref 8–20)
BUN SERPL-MCNC: 25 MG/DL — HIGH (ref 8–20)
CALCIUM SERPL-MCNC: 8.7 MG/DL — SIGNIFICANT CHANGE UP (ref 8.6–10.2)
CALCIUM SERPL-MCNC: 9.4 MG/DL — SIGNIFICANT CHANGE UP (ref 8.6–10.2)
CHLORIDE SERPL-SCNC: 90 MMOL/L — LOW (ref 98–107)
CHLORIDE SERPL-SCNC: 96 MMOL/L — LOW (ref 98–107)
CO2 SERPL-SCNC: 33 MMOL/L — HIGH (ref 22–29)
CO2 SERPL-SCNC: 34 MMOL/L — HIGH (ref 22–29)
CREAT SERPL-MCNC: 0.94 MG/DL — SIGNIFICANT CHANGE UP (ref 0.5–1.3)
CREAT SERPL-MCNC: 1.06 MG/DL — SIGNIFICANT CHANGE UP (ref 0.5–1.3)
CULTURE RESULTS: SIGNIFICANT CHANGE UP
GLUCOSE BLDC GLUCOMTR-MCNC: 119 MG/DL — HIGH (ref 70–99)
GLUCOSE BLDC GLUCOMTR-MCNC: 155 MG/DL — HIGH (ref 70–99)
GLUCOSE BLDC GLUCOMTR-MCNC: 168 MG/DL — HIGH (ref 70–99)
GLUCOSE BLDC GLUCOMTR-MCNC: 285 MG/DL — HIGH (ref 70–99)
GLUCOSE SERPL-MCNC: 102 MG/DL — SIGNIFICANT CHANGE UP (ref 70–115)
GLUCOSE SERPL-MCNC: 147 MG/DL — HIGH (ref 70–115)
HCT VFR BLD CALC: 34.3 % — LOW (ref 37–47)
HGB BLD-MCNC: 10.3 G/DL — LOW (ref 12–16)
MAGNESIUM SERPL-MCNC: 2.3 MG/DL — SIGNIFICANT CHANGE UP (ref 1.8–2.6)
MCHC RBC-ENTMCNC: 21.8 PG — LOW (ref 27–31)
MCHC RBC-ENTMCNC: 30 G/DL — LOW (ref 32–36)
MCV RBC AUTO: 72.5 FL — LOW (ref 81–99)
PHOSPHATE SERPL-MCNC: 2.6 MG/DL — SIGNIFICANT CHANGE UP (ref 2.4–4.7)
PLATELET # BLD AUTO: 457 K/UL — HIGH (ref 150–400)
POTASSIUM SERPL-MCNC: 2.9 MMOL/L — CRITICAL LOW (ref 3.5–5.3)
POTASSIUM SERPL-MCNC: 3 MMOL/L — LOW (ref 3.5–5.3)
POTASSIUM SERPL-SCNC: 2.9 MMOL/L — CRITICAL LOW (ref 3.5–5.3)
POTASSIUM SERPL-SCNC: 3 MMOL/L — LOW (ref 3.5–5.3)
RBC # BLD: 4.73 M/UL — SIGNIFICANT CHANGE UP (ref 4.4–5.2)
RBC # FLD: 19.4 % — HIGH (ref 11–15.6)
SODIUM SERPL-SCNC: 138 MMOL/L — SIGNIFICANT CHANGE UP (ref 135–145)
SODIUM SERPL-SCNC: 140 MMOL/L — SIGNIFICANT CHANGE UP (ref 135–145)
SPECIMEN SOURCE: SIGNIFICANT CHANGE UP
WBC # BLD: 10.4 K/UL — SIGNIFICANT CHANGE UP (ref 4.8–10.8)
WBC # FLD AUTO: 10.4 K/UL — SIGNIFICANT CHANGE UP (ref 4.8–10.8)

## 2017-11-21 PROCEDURE — 99233 SBSQ HOSP IP/OBS HIGH 50: CPT

## 2017-11-21 RX ORDER — POTASSIUM PHOSPHATE, MONOBASIC POTASSIUM PHOSPHATE, DIBASIC 236; 224 MG/ML; MG/ML
30 INJECTION, SOLUTION INTRAVENOUS ONCE
Qty: 0 | Refills: 0 | Status: COMPLETED | OUTPATIENT
Start: 2017-11-21 | End: 2017-11-21

## 2017-11-21 RX ORDER — POTASSIUM CHLORIDE 20 MEQ
20 PACKET (EA) ORAL
Qty: 0 | Refills: 0 | Status: DISCONTINUED | OUTPATIENT
Start: 2017-11-21 | End: 2017-11-27

## 2017-11-21 RX ADMIN — MIRTAZAPINE 15 MILLIGRAM(S): 45 TABLET, ORALLY DISINTEGRATING ORAL at 13:56

## 2017-11-21 RX ADMIN — Medication 40 MILLIGRAM(S): at 06:08

## 2017-11-21 RX ADMIN — CEFTRIAXONE 100 GRAM(S): 500 INJECTION, POWDER, FOR SOLUTION INTRAMUSCULAR; INTRAVENOUS at 13:57

## 2017-11-21 RX ADMIN — POTASSIUM PHOSPHATE, MONOBASIC POTASSIUM PHOSPHATE, DIBASIC 85 MILLIMOLE(S): 236; 224 INJECTION, SOLUTION INTRAVENOUS at 18:23

## 2017-11-21 RX ADMIN — Medication 240 MILLIGRAM(S): at 05:40

## 2017-11-21 RX ADMIN — HEPARIN SODIUM 5000 UNIT(S): 5000 INJECTION INTRAVENOUS; SUBCUTANEOUS at 05:40

## 2017-11-21 RX ADMIN — Medication 25 MILLIGRAM(S): at 05:40

## 2017-11-21 RX ADMIN — Medication 500 MILLIGRAM(S): at 13:56

## 2017-11-21 RX ADMIN — ISOSORBIDE MONONITRATE 120 MILLIGRAM(S): 60 TABLET, EXTENDED RELEASE ORAL at 13:56

## 2017-11-21 RX ADMIN — HEPARIN SODIUM 5000 UNIT(S): 5000 INJECTION INTRAVENOUS; SUBCUTANEOUS at 18:23

## 2017-11-21 RX ADMIN — Medication 1 CAPSULE(S): at 08:19

## 2017-11-21 RX ADMIN — Medication 50 MILLIGRAM(S): at 18:24

## 2017-11-21 RX ADMIN — TIOTROPIUM BROMIDE 1 CAPSULE(S): 18 CAPSULE ORAL; RESPIRATORY (INHALATION) at 08:29

## 2017-11-21 RX ADMIN — Medication 1 TABLET(S): at 18:23

## 2017-11-21 RX ADMIN — INSULIN GLARGINE 14 UNIT(S): 100 INJECTION, SOLUTION SUBCUTANEOUS at 08:19

## 2017-11-21 RX ADMIN — Medication 1 CAPSULE(S): at 13:56

## 2017-11-21 RX ADMIN — Medication 100 MILLIGRAM(S): at 13:57

## 2017-11-21 RX ADMIN — Medication 50 MILLIGRAM(S): at 05:40

## 2017-11-21 RX ADMIN — Medication 1 CAPSULE(S): at 18:23

## 2017-11-21 RX ADMIN — Medication 25 MILLIGRAM(S): at 18:27

## 2017-11-21 RX ADMIN — Medication 1 TABLET(S): at 05:40

## 2017-11-21 RX ADMIN — Medication 25 MILLIGRAM(S): at 18:24

## 2017-11-21 NOTE — PROGRESS NOTE ADULT - ASSESSMENT
1. elderly F awaiting colon surgery for likely cancer.  2 chronic afib-ac on hold.  3. ILD  4 gout  5. IDDM  6. 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  7. Fever-on rocephin-source urine? 1. elderly F awaiting colon surgery for likely cancer.  2 chronic afib-ac on hold.  3. ILD  4 gout  5. IDDM  6. 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  7. Fever-on rocephin-source urine  8. hypokalemia-likely from diuretics-replete K and watch mag.

## 2017-11-21 NOTE — PROGRESS NOTE ADULT - SUBJECTIVE AND OBJECTIVE BOX
No acute overnight events. CT A/P without e/o obstruction. Afebrile and normalized WBC.    MEDICATIONS  (STANDING):  allopurinol 100 milliGRAM(s) Oral daily  alvimopan 12 milliGRAM(s) Oral once  amylase/lipase/protease  (CREON  6,000 Units) 1 Capsule(s) Oral three times a day with meals  ascorbic acid 500 milliGRAM(s) Oral daily  calcium carbonate 1250 mG + Vitamin D (OsCal 500 + D) 1 Tablet(s) Oral two times a day  cefTRIAXone   IVPB 1 Gram(s) IV Intermittent every 24 hours  cefTRIAXone   IVPB      diltiazem    milliGRAM(s) Oral daily  heparin  Injectable 5000 Unit(s) SubCutaneous every 12 hours  hydrALAZINE 50 milliGRAM(s) Oral two times a day  insulin glargine Injectable (LANTUS) 14 Unit(s) SubCutaneous every morning  isosorbide   mononitrate ER Tablet (IMDUR) 120 milliGRAM(s) Oral daily  lidocaine   Patch 1 Patch Transdermal daily  methimazole 2.5 milliGRAM(s) Oral <User Schedule>  metoprolol     tartrate 25 milliGRAM(s) Oral two times a day  mirtazapine 15 milliGRAM(s) Oral daily  polyethylene glycol 3350 17 Gram(s) Oral daily  pregabalin 25 milliGRAM(s) Oral two times a day  tiotropium 18 MICROgram(s) Capsule 1 Capsule(s) Inhalation daily  torsemide 40 milliGRAM(s) Oral before breakfast    MEDICATIONS  (PRN):  acetaminophen   Tablet 650 milliGRAM(s) Oral every 6 hours PRN For Temp greater than 38 C (100.4 F)  acetaminophen   Tablet. 650 milliGRAM(s) Oral every 6 hours PRN Mild Pain (1 - 3)  ALBUTerol    0.083% 2.5 milliGRAM(s) Nebulizer every 6 hours PRN Shortness of Breath  ondansetron Injectable 4 milliGRAM(s) IV Push every 6 hours PRN Nausea    Vital Signs Last 24 Hrs  T(C): 36.8 (21 Nov 2017 08:32), Max: 36.8 (21 Nov 2017 08:32)  T(F): 98.2 (21 Nov 2017 08:32), Max: 98.2 (21 Nov 2017 08:32)  HR: 88 (21 Nov 2017 08:32) (74 - 88)  BP: 126/74 (21 Nov 2017 08:32) (122/66 - 146/63)  BP(mean): --  RR: 18 (21 Nov 2017 08:32) (18 - 18)  SpO2: 88% (21 Nov 2017 08:32) (88% - 96%)  I&O's Detail    20 Nov 2017 07:01  -  21 Nov 2017 07:00  --------------------------------------------------------  IN:    multiple electrolytes Injection Type 1multiple electrolytes Injection Type 1: 1600 mL    sodium chloride 0.9%: 100 mL  Total IN: 1700 mL    OUT:  Total OUT: 0 mL    Total NET: 1700 mL    NAD  ABD exam :soft, NT, moderate distention                        10.3   10.4  )-----------( 457      ( 21 Nov 2017 07:57 )             34.3     11-21    140  |  96<L>  |  22.0<H>  ----------------------------<  102  2.9<LL>   |  33.0<H>  |  0.94    Ca    8.7      21 Nov 2017 07:57  Phos  2.6     11-21  Mg     2.3     11-21    TPro  6.7  /  Alb  3.2<L>  /  TBili  0.4  /  DBili  x   /  AST  22  /  ALT  13  /  AlkPhos  99  11-20

## 2017-11-21 NOTE — PROGRESS NOTE ADULT - SUBJECTIVE AND OBJECTIVE BOX
NEPHROLOGY INTERVAL HPI/OVERNIGHT EVENTS:  Events of yesterday afternoon noted  Surgery on hold due to fever and possible infection  no acute distress when seen earlier and feels well    MEDICATIONS  (STANDING):  allopurinol 100 milliGRAM(s) Oral daily  alvimopan 12 milliGRAM(s) Oral once  amylase/lipase/protease  (CREON  6,000 Units) 1 Capsule(s) Oral three times a day with meals  ascorbic acid 500 milliGRAM(s) Oral daily  calcium carbonate 1250 mG + Vitamin D (OsCal 500 + D) 1 Tablet(s) Oral two times a day  cefTRIAXone   IVPB 1 Gram(s) IV Intermittent every 24 hours  cefTRIAXone   IVPB      diltiazem    milliGRAM(s) Oral daily  heparin  Injectable 5000 Unit(s) SubCutaneous every 12 hours  hydrALAZINE 50 milliGRAM(s) Oral two times a day  insulin glargine Injectable (LANTUS) 14 Unit(s) SubCutaneous every morning  isosorbide   mononitrate ER Tablet (IMDUR) 120 milliGRAM(s) Oral daily  lidocaine   Patch 1 Patch Transdermal daily  methimazole 2.5 milliGRAM(s) Oral <User Schedule>  metoprolol     tartrate 25 milliGRAM(s) Oral two times a day  mirtazapine 15 milliGRAM(s) Oral daily  polyethylene glycol 3350 17 Gram(s) Oral daily  potassium phosphate IVPB 30 milliMole(s) IV Intermittent once  pregabalin 25 milliGRAM(s) Oral two times a day  tiotropium 18 MICROgram(s) Capsule 1 Capsule(s) Inhalation daily  torsemide 40 milliGRAM(s) Oral before breakfast    MEDICATIONS  (PRN):  acetaminophen   Tablet 650 milliGRAM(s) Oral every 6 hours PRN For Temp greater than 38 C (100.4 F)  acetaminophen   Tablet. 650 milliGRAM(s) Oral every 6 hours PRN Mild Pain (1 - 3)  ALBUTerol    0.083% 2.5 milliGRAM(s) Nebulizer every 6 hours PRN Shortness of Breath  ondansetron Injectable 4 milliGRAM(s) IV Push every 6 hours PRN Nausea      Allergies    Cipro (Unknown)  contrast media (iodine-based) (Unknown)  penicillins (Unknown)  shellfish (Unknown)  Valium (Unknown)          Vital Signs Last 24 Hrs  T(C): 36.8 (2017 08:32), Max: 36.8 (2017 08:32)  T(F): 98.2 (2017 08:32), Max: 98.2 (2017 08:32)  HR: 88 (2017 08:32) (74 - 88)  BP: 126/74 (2017 08:32) (122/66 - 146/63)  BP(mean): --  RR: 18 (2017 08:32) (18 - 18)  SpO2: 88% (2017 08:32) (88% - 96%)    PHYSICAL EXAM:  GENERAL: NAD, generalized weakness  HEAD:  Atraumatic, Normocephalic  EYES: EOMI, PERRLA, conjunctiva and sclera clear  NECK: Supple, No JVD  NERVOUS SYSTEM:  Alert & Oriented X3, intact and symmetric  CHEST/LUNG: + chronic crackles  HEART: Irr no rub  ABDOMEN: Soft, Nontender + distension + BS  EXTREMITIES:  2+ Peripheral Pulses, Tr edema; R hand no erythema nor pain  LYMPH: No lymphadenopathy noted  SKIN: No rashes or lesions    LABS:                        10.3   10.4  )-----------( 457      ( 2017 07:57 )             34.3     11-21    140  |  96<L>  |  22.0<H>  ----------------------------<  102  2.9<LL>   |  33.0<H>  |  0.94    Ca    8.7      2017 07:57  Phos  2.6     11-21  Mg     2.3     11-21    TPro  6.7  /  Alb  3.2<L>  /  TBili  0.4  /  DBili  x   /  AST  22  /  ALT  13  /  AlkPhos  99  11-20    PT/INR - ( 2017 08:13 )   PT: 12.8 sec;   INR: 1.16 ratio         PTT - ( 2017 08:13 )  PTT:33.6 sec    Urinalysis Basic - ( 2017 09:56 )  Culture Results:   <10,000 CFU/ml Gram Negative Rods (17 @ 09:56)      Color: Yellow / Appearance: Clear / S.005 / pH: x  Gluc: x / Ketone: Negative  / Bili: Negative / Urobili: Negative mg/dL   Blood: x / Protein: Negative mg/dL / Nitrite: Negative   Leuk Esterase: Moderate / RBC: 0-2 /HPF / WBC 26-50   Sq Epi: x / Non Sq Epi: x / Bacteria: Occasional      Magnesium, Serum: 2.3 mg/dL ( @ 07:57)  Phosphorus Level, Serum: 2.6 mg/dL ( @ 07:57)      RADIOLOGY & ADDITIONAL TESTS:

## 2017-11-21 NOTE — PROGRESS NOTE ADULT - SUBJECTIVE AND OBJECTIVE BOX
Chief Complaint: Recent course reviewed.    HPI: colon resection postponed due to fever. On rocephin. Her renal insufficiency has improved with normalization of creatinine with dc'ing diuretics. Started on allopurinol and colchicine for gout attack.    PAST MEDICAL & SURGICAL HISTORY:  Renal insufficiency  Malignant neoplasm of descending colon  Microcytic anemia  ILD (interstitial lung disease)  Tricuspid valve insufficiency, unspecified etiology  Mitral valve insufficiency, unspecified etiology  Aortic valve insufficiency, etiology of cardiac valve disease unspecified  IDDM (insulin dependent diabetes mellitus)  COPD, mild  Atrial fibrillation, unspecified type  Diabetes  Cardiomegaly  Congestive heart failure, unspecified congestive heart failure chronicity, unspecified congestive heart failure type  H/O: hysterectomy  History of laparoscopic cholecystectomy  H/O splenectomy      PREVIOUS DIAGNOSTIC TESTING:      ECHO  FINDINGS:    STRESS  FINDINGS:    CATHETERIZATION  FINDINGS:    MEDICATIONS  (STANDING):  allopurinol 100 milliGRAM(s) Oral daily  alvimopan 12 milliGRAM(s) Oral once  amylase/lipase/protease  (CREON  6,000 Units) 1 Capsule(s) Oral three times a day with meals  ascorbic acid 500 milliGRAM(s) Oral daily  calcium carbonate 1250 mG + Vitamin D (OsCal 500 + D) 1 Tablet(s) Oral two times a day  cefTRIAXone   IVPB 1 Gram(s) IV Intermittent every 24 hours  cefTRIAXone   IVPB      diltiazem    milliGRAM(s) Oral daily  heparin  Injectable 5000 Unit(s) SubCutaneous every 12 hours  hydrALAZINE 50 milliGRAM(s) Oral two times a day  insulin glargine Injectable (LANTUS) 14 Unit(s) SubCutaneous every morning  isosorbide   mononitrate ER Tablet (IMDUR) 120 milliGRAM(s) Oral daily  lidocaine   Patch 1 Patch Transdermal daily  methimazole 2.5 milliGRAM(s) Oral <User Schedule>  metoprolol     tartrate 25 milliGRAM(s) Oral two times a day  mirtazapine 15 milliGRAM(s) Oral daily  polyethylene glycol 3350 17 Gram(s) Oral daily  potassium phosphate IVPB 30 milliMole(s) IV Intermittent once  pregabalin 25 milliGRAM(s) Oral two times a day  tiotropium 18 MICROgram(s) Capsule 1 Capsule(s) Inhalation daily  torsemide 40 milliGRAM(s) Oral before breakfast    MEDICATIONS  (PRN):  acetaminophen   Tablet 650 milliGRAM(s) Oral every 6 hours PRN For Temp greater than 38 C (100.4 F)  acetaminophen   Tablet. 650 milliGRAM(s) Oral every 6 hours PRN Mild Pain (1 - 3)  ALBUTerol    0.083% 2.5 milliGRAM(s) Nebulizer every 6 hours PRN Shortness of Breath  ondansetron Injectable 4 milliGRAM(s) IV Push every 6 hours PRN Nausea      FAMILY HISTORY:  No pertinent family history in first degree relatives      ROS: Negative other than as mentioned in HPI.    Vital Signs Last 24 Hrs  T(C): 36.9 (2017 16:39), Max: 36.9 (2017 16:39)  T(F): 98.4 (2017 16:39), Max: 98.4 (2017 16:39)  HR: 94 (2017 16:39) (74 - 94)  BP: 110/70 ra manually (2017 16:39) (122/66 - 146/63)  BP(mean): --  RR: 14 (2017 16:39) (18 - 18)  SpO2: 96% (2017 16:39) (88% - 96%)    PHYSICAL EXAM:  General: Appears well developed, well nourished alert and cooperative. Elderly afebrile F nad.  HEENT: Head; normocephalic, atraumatic.  Eyes;   Pupils reactive, cornea wnl.  Neck; Supple, no nodes adenopathy, no NVD or carotid bruit or thyromegaly.  CARDIOVASCULAR; No murmur, rub, gallop or lift. Normal S1 and S2. irreg rhythm  LUNGS; No rales, rhonchi or wheeze. Normal breath sounds bilaterally.  ABDOMEN ; Soft, nontender without mass or organomegaly. bowel sounds normoactive.  EXTREMITIES; No clubbing, cyanosis or edema. Distal pulses wnl.  SKIN; warm and dry with normal turgor.  NEURO; Alert/oriented x 3/normal motor exam.   PSYCH; normal affect.            INTERPRETATION OF TELEMETRY:    ECG: afib lbbb    I&O's Detail    2017 07:01  -  2017 07:00  --------------------------------------------------------  IN:    multiple electrolytes Injection Type 1multiple electrolytes Injection Type 1: 1600 mL    sodium chloride 0.9%: 100 mL  Total IN: 1700 mL    OUT:  Total OUT: 0 mL    Total NET: 1700 mL          LABS:                        10.3   10.4  )-----------( 457      ( 2017 07:57 )             34.3     11-    140  |  96<L>  |  22.0<H>  ----------------------------<  102  2.9<LL>   |  33.0<H>  |  0.94    Ca    8.7      2017 07:57  Phos  2.6       Mg     2.3         TPro  6.7  /  Alb  3.2<L>  /  TBili  0.4  /  DBili  x   /  AST  22  /  ALT  13  /  AlkPhos  99  11-20        PT/INR - ( 2017 08:13 )   PT: 12.8 sec;   INR: 1.16 ratio         PTT - ( 2017 08:13 )  PTT:33.6 sec  Urinalysis Basic - ( 2017 09:56 )    Color: Yellow / Appearance: Clear / S.005 / pH: x  Gluc: x / Ketone: Negative  / Bili: Negative / Urobili: Negative mg/dL   Blood: x / Protein: Negative mg/dL / Nitrite: Negative   Leuk Esterase: Moderate / RBC: 0-2 /HPF / WBC 26-50   Sq Epi: x / Non Sq Epi: x / Bacteria: Occasional      I&O's Summary    2017 07:01  -  2017 07:00  --------------------------------------------------------  IN: 1700 mL / OUT: 0 mL / NET: 1700 mL        RADIOLOGY & ADDITIONAL STUDIES:

## 2017-11-21 NOTE — PROGRESS NOTE ADULT - ASSESSMENT
CRF, ARF due to pre renal azotemia due to diuretics  (Pt well known to me from office)  Colonic neoplasm scheduled for OR when stable  Afib; CHF  COPD/ILD  DM  - supplement K+ aggressively  - monitor labs    R hand pain/erythema: likely gout==> improved clinically  Hyperuricemia noted (partially due to diuretics)  - holding metolazone for now  - added allopurinol    Fever: leukocytosis==> resolved ? etiology  urine culture from yesterday shows no growth  - continue Rocephin for now

## 2017-11-22 LAB
ANION GAP SERPL CALC-SCNC: 13 MMOL/L — SIGNIFICANT CHANGE UP (ref 5–17)
ANISOCYTOSIS BLD QL: SLIGHT — SIGNIFICANT CHANGE UP
BUN SERPL-MCNC: 29 MG/DL — HIGH (ref 8–20)
BURR CELLS BLD QL SMEAR: PRESENT — SIGNIFICANT CHANGE UP
CALCIUM SERPL-MCNC: 8.8 MG/DL — SIGNIFICANT CHANGE UP (ref 8.6–10.2)
CHLORIDE SERPL-SCNC: 92 MMOL/L — LOW (ref 98–107)
CO2 SERPL-SCNC: 31 MMOL/L — HIGH (ref 22–29)
CREAT SERPL-MCNC: 1.25 MG/DL — SIGNIFICANT CHANGE UP (ref 0.5–1.3)
ELLIPTOCYTES BLD QL SMEAR: SLIGHT — SIGNIFICANT CHANGE UP
EOSINOPHIL # BLD AUTO: 0.2 K/UL — SIGNIFICANT CHANGE UP (ref 0–0.5)
EOSINOPHIL NFR BLD AUTO: 2 % — SIGNIFICANT CHANGE UP (ref 0–5)
GLUCOSE BLDC GLUCOMTR-MCNC: 126 MG/DL — HIGH (ref 70–99)
GLUCOSE BLDC GLUCOMTR-MCNC: 141 MG/DL — HIGH (ref 70–99)
GLUCOSE SERPL-MCNC: 122 MG/DL — HIGH (ref 70–115)
HCT VFR BLD CALC: 34.1 % — LOW (ref 37–47)
HGB BLD-MCNC: 10.5 G/DL — LOW (ref 12–16)
HYPOCHROMIA BLD QL: SLIGHT — SIGNIFICANT CHANGE UP
LYMPHOCYTES # BLD AUTO: 19 % — LOW (ref 20–55)
LYMPHOCYTES # BLD AUTO: 2.4 K/UL — SIGNIFICANT CHANGE UP (ref 1–4.8)
MCHC RBC-ENTMCNC: 22 PG — LOW (ref 27–31)
MCHC RBC-ENTMCNC: 30.8 G/DL — LOW (ref 32–36)
MCV RBC AUTO: 71.3 FL — LOW (ref 81–99)
MICROCYTES BLD QL: SLIGHT — SIGNIFICANT CHANGE UP
MONOCYTES # BLD AUTO: 1.6 K/UL — HIGH (ref 0–0.8)
MONOCYTES NFR BLD AUTO: 14 % — HIGH (ref 3–10)
NEUTROPHILS # BLD AUTO: 5.8 K/UL — SIGNIFICANT CHANGE UP (ref 1.8–8)
NEUTROPHILS NFR BLD AUTO: 63 % — SIGNIFICANT CHANGE UP (ref 37–73)
NEUTS BAND # BLD: 2 % — SIGNIFICANT CHANGE UP (ref 0–8)
OVALOCYTES BLD QL SMEAR: SLIGHT — SIGNIFICANT CHANGE UP
PLAT MORPH BLD: NORMAL — SIGNIFICANT CHANGE UP
PLATELET # BLD AUTO: 417 K/UL — HIGH (ref 150–400)
POIKILOCYTOSIS BLD QL AUTO: SLIGHT — SIGNIFICANT CHANGE UP
POLYCHROMASIA BLD QL SMEAR: SLIGHT — SIGNIFICANT CHANGE UP
POTASSIUM SERPL-MCNC: 3.3 MMOL/L — LOW (ref 3.5–5.3)
POTASSIUM SERPL-SCNC: 3.3 MMOL/L — LOW (ref 3.5–5.3)
RBC # BLD: 4.78 M/UL — SIGNIFICANT CHANGE UP (ref 4.4–5.2)
RBC # FLD: 19.5 % — HIGH (ref 11–15.6)
RBC BLD AUTO: ABNORMAL
SCHISTOCYTES BLD QL AUTO: SLIGHT — SIGNIFICANT CHANGE UP
SODIUM SERPL-SCNC: 136 MMOL/L — SIGNIFICANT CHANGE UP (ref 135–145)
TARGETS BLD QL SMEAR: SIGNIFICANT CHANGE UP
WBC # BLD: 10 K/UL — SIGNIFICANT CHANGE UP (ref 4.8–10.8)
WBC # FLD AUTO: 10 K/UL — SIGNIFICANT CHANGE UP (ref 4.8–10.8)

## 2017-11-22 RX ADMIN — Medication 25 MILLIGRAM(S): at 16:16

## 2017-11-22 RX ADMIN — CEFTRIAXONE 100 GRAM(S): 500 INJECTION, POWDER, FOR SOLUTION INTRAMUSCULAR; INTRAVENOUS at 11:06

## 2017-11-22 RX ADMIN — Medication 20 MILLIEQUIVALENT(S): at 06:32

## 2017-11-22 RX ADMIN — Medication 1 CAPSULE(S): at 07:57

## 2017-11-22 RX ADMIN — Medication 40 MILLIGRAM(S): at 06:32

## 2017-11-22 RX ADMIN — Medication 20 MILLIEQUIVALENT(S): at 16:20

## 2017-11-22 RX ADMIN — Medication 1 TABLET(S): at 06:32

## 2017-11-22 RX ADMIN — Medication 50 MILLIGRAM(S): at 06:32

## 2017-11-22 RX ADMIN — Medication 50 MILLIGRAM(S): at 16:15

## 2017-11-22 RX ADMIN — Medication 25 MILLIGRAM(S): at 06:32

## 2017-11-22 RX ADMIN — Medication 240 MILLIGRAM(S): at 06:32

## 2017-11-22 RX ADMIN — MIRTAZAPINE 15 MILLIGRAM(S): 45 TABLET, ORALLY DISINTEGRATING ORAL at 11:07

## 2017-11-22 RX ADMIN — HEPARIN SODIUM 5000 UNIT(S): 5000 INJECTION INTRAVENOUS; SUBCUTANEOUS at 06:31

## 2017-11-22 RX ADMIN — HEPARIN SODIUM 5000 UNIT(S): 5000 INJECTION INTRAVENOUS; SUBCUTANEOUS at 16:15

## 2017-11-22 RX ADMIN — Medication 1 CAPSULE(S): at 11:08

## 2017-11-22 RX ADMIN — Medication 100 MILLIGRAM(S): at 11:06

## 2017-11-22 RX ADMIN — ISOSORBIDE MONONITRATE 120 MILLIGRAM(S): 60 TABLET, EXTENDED RELEASE ORAL at 11:06

## 2017-11-22 RX ADMIN — POLYETHYLENE GLYCOL 3350 17 GRAM(S): 17 POWDER, FOR SOLUTION ORAL at 11:07

## 2017-11-22 RX ADMIN — Medication 500 MILLIGRAM(S): at 11:07

## 2017-11-22 RX ADMIN — Medication 1 TABLET(S): at 16:15

## 2017-11-22 RX ADMIN — Medication 25 MILLIGRAM(S): at 16:20

## 2017-11-22 RX ADMIN — INSULIN GLARGINE 14 UNIT(S): 100 INJECTION, SOLUTION SUBCUTANEOUS at 07:57

## 2017-11-22 RX ADMIN — Medication 1 CAPSULE(S): at 16:15

## 2017-11-22 NOTE — PROGRESS NOTE ADULT - ASSESSMENT
CRF, ARF due to pre renal azotemia due to diuretics  (Pt well known to me from office)  Colonic neoplasm scheduled for OR when stable  Afib; CHF  COPD/ILD  DM  - continue to supplement K+ aggressively  - monitor labs  - awaiting surgery to be rescheduled    R hand pain/erythema: likely gout==> improved clinically  Hyperuricemia noted (partially due to diuretics)  - holding metolazone for now  - added allopurinol; monitor serum uric acid    Fever: leukocytosis==> resolved and no further episodes  urine culture and blood culture negative  - continue Rocephin for now

## 2017-11-22 NOTE — PROGRESS NOTE ADULT - SUBJECTIVE AND OBJECTIVE BOX
[facility]                                   [address] [place] [address]                                                      PHONE: [phone]                                                         FAX: [phone]  [email or web address]  ---------------------------------------------------------------------------------------------------------------------------------    Overnight events/patient complaints: No acute concerns. Surgical procedure delayed 2/2 febrile episode     PAST MEDICAL & SURGICAL HISTORY:  Renal insufficiency  Malignant neoplasm of descending colon  Microcytic anemia  ILD (interstitial lung disease)  Tricuspid valve insufficiency, unspecified etiology  Mitral valve insufficiency, unspecified etiology  Aortic valve insufficiency, etiology of cardiac valve disease unspecified  IDDM (insulin dependent diabetes mellitus)  COPD, mild  Atrial fibrillation, unspecified type  Diabetes  Cardiomegaly  Congestive heart failure, unspecified congestive heart failure chronicity, unspecified congestive heart failure type  H/O: hysterectomy  History of laparoscopic cholecystectomy  H/O splenectomy    MEDICATIONS  (STANDING):  allopurinol 100 milliGRAM(s) Oral daily  alvimopan 12 milliGRAM(s) Oral once  amylase/lipase/protease  (CREON  6,000 Units) 1 Capsule(s) Oral three times a day with meals  ascorbic acid 500 milliGRAM(s) Oral daily  calcium carbonate 1250 mG + Vitamin D (OsCal 500 + D) 1 Tablet(s) Oral two times a day  cefTRIAXone   IVPB 1 Gram(s) IV Intermittent every 24 hours  cefTRIAXone   IVPB      diltiazem    milliGRAM(s) Oral daily  heparin  Injectable 5000 Unit(s) SubCutaneous every 12 hours  hydrALAZINE 50 milliGRAM(s) Oral two times a day  insulin glargine Injectable (LANTUS) 14 Unit(s) SubCutaneous every morning  isosorbide   mononitrate ER Tablet (IMDUR) 120 milliGRAM(s) Oral daily  lidocaine   Patch 1 Patch Transdermal daily  methimazole 2.5 milliGRAM(s) Oral <User Schedule>  metoprolol     tartrate 25 milliGRAM(s) Oral two times a day  mirtazapine 15 milliGRAM(s) Oral daily  polyethylene glycol 3350 17 Gram(s) Oral daily  potassium chloride    Tablet ER 20 milliEquivalent(s) Oral two times a day  pregabalin 25 milliGRAM(s) Oral two times a day  tiotropium 18 MICROgram(s) Capsule 1 Capsule(s) Inhalation daily  torsemide 40 milliGRAM(s) Oral before breakfast    MEDICATIONS  (PRN):  acetaminophen   Tablet 650 milliGRAM(s) Oral every 6 hours PRN For Temp greater than 38 C (100.4 F)  acetaminophen   Tablet. 650 milliGRAM(s) Oral every 6 hours PRN Mild Pain (1 - 3)  ALBUTerol    0.083% 2.5 milliGRAM(s) Nebulizer every 6 hours PRN Shortness of Breath  ondansetron Injectable 4 milliGRAM(s) IV Push every 6 hours PRN Nausea      Vital Signs Last 24 Hrs  T(C): 37.1 (22 Nov 2017 09:31), Max: 37.1 (21 Nov 2017 23:05)  T(F): 98.7 (22 Nov 2017 09:31), Max: 98.7 (21 Nov 2017 23:05)  HR: 71 (22 Nov 2017 09:31) (71 - 94)  BP: 109/64 (22 Nov 2017 09:31) (109/64 - 129/69)  BP(mean): --  RR: 18 (22 Nov 2017 09:31) (18 - 20)  SpO2: 94% (22 Nov 2017 09:31) (94% - 96%)  ICU Vital Signs Last 24 Hrs  GLENNA QUINN  I&O's Detail    I&O's Summary    Drug Dosing Weight  KARISVIVIANA KAI      PHYSICAL EXAM:  General: Appears well developed, well nourished alert and cooperative.  HEENT: Head; normocephalic, atraumatic.  Eyes: Pupils reactive, cornea wnl.  Neck: Supple, no nodes adenopathy, no NVD or carotid bruit or thyromegaly.  CARDIOVASCULAR: Normal S1 and S2, No murmur, rub, gallop or lift.   LUNGS: No rales, rhonchi or wheeze. Normal breath sounds bilaterally.  ABDOMEN: Soft, nontender without mass or organomegaly. bowel sounds normoactive.  EXTREMITIES: No clubbing, cyanosis or edema. Distal pulses wnl.   SKIN: warm and dry with normal turgor.  NEURO: Alert/oriented x 3/normal motor exam. No pathologic reflexes.    PSYCH: normal affect.        LABS:                        10.5   10.0  )-----------( 417      ( 22 Nov 2017 08:56 )             34.1     11-22    136  |  92<L>  |  29.0<H>  ----------------------------<  122<H>  3.3<L>   |  31.0<H>  |  1.25    Ca    8.8      22 Nov 2017 08:56  Phos  2.6     11-21  Mg     2.3     11-21      ASSESSMENT AND PLAN:  In summary, GLENNA QUINN is an 89y Female with past medical history significant for atrial fibrillation, ILD, DM, HFpEF who presents with c/o progressively enlarging abd girth over 1 yr, s/p colonoscopy 10/19/17, found to have tubular adenoma at ileocecal valve and villous adenoma with high grade dysplasia/intramucosa carcinoma pending colectomy     Atrial fibrillation  - coumadin held as patient is pending OR  - allow INR to drift down   - rate controlled, continue metoprolol and diltiazem     HFpEF  Continue torsemide; patient is close to euvolemia   Hypokalemia: Replete K     Pt. is an increased surgical and anesthetic risk given her significant comorbidities. There are no absolute contraindications to surgery.  Continue current medications     Thank you for allowing [facility] to participate in the care of this patient.  Please feel free to call with any questions or concerns.

## 2017-11-22 NOTE — PROGRESS NOTE ADULT - ASSESSMENT
89 year old female admitted for colon surgery but had code sepsis called for infection. On antibiotics and now stable. No further fevers. WBC improving. Blood cultures negative and urine only with 10K CFU. Continue diet. Replace potassium. Will discuss timing of surgery with Dr. Flowers.

## 2017-11-22 NOTE — PROGRESS NOTE ADULT - SUBJECTIVE AND OBJECTIVE BOX
NEPHROLOGY INTERVAL HPI/OVERNIGHT EVENTS:  pt doing well  no acute distress  tolerating diet    MEDICATIONS  (STANDING):  allopurinol 100 milliGRAM(s) Oral daily  alvimopan 12 milliGRAM(s) Oral once  amylase/lipase/protease  (CREON  6,000 Units) 1 Capsule(s) Oral three times a day with meals  ascorbic acid 500 milliGRAM(s) Oral daily  calcium carbonate 1250 mG + Vitamin D (OsCal 500 + D) 1 Tablet(s) Oral two times a day  cefTRIAXone   IVPB 1 Gram(s) IV Intermittent every 24 hours  cefTRIAXone   IVPB      diltiazem    milliGRAM(s) Oral daily  heparin  Injectable 5000 Unit(s) SubCutaneous every 12 hours  hydrALAZINE 50 milliGRAM(s) Oral two times a day  insulin glargine Injectable (LANTUS) 14 Unit(s) SubCutaneous every morning  isosorbide   mononitrate ER Tablet (IMDUR) 120 milliGRAM(s) Oral daily  lidocaine   Patch 1 Patch Transdermal daily  methimazole 2.5 milliGRAM(s) Oral <User Schedule>  metoprolol     tartrate 25 milliGRAM(s) Oral two times a day  mirtazapine 15 milliGRAM(s) Oral daily  polyethylene glycol 3350 17 Gram(s) Oral daily  potassium chloride    Tablet ER 20 milliEquivalent(s) Oral two times a day  pregabalin 25 milliGRAM(s) Oral two times a day  tiotropium 18 MICROgram(s) Capsule 1 Capsule(s) Inhalation daily  torsemide 40 milliGRAM(s) Oral before breakfast    MEDICATIONS  (PRN):  acetaminophen   Tablet 650 milliGRAM(s) Oral every 6 hours PRN For Temp greater than 38 C (100.4 F)  acetaminophen   Tablet. 650 milliGRAM(s) Oral every 6 hours PRN Mild Pain (1 - 3)  ALBUTerol    0.083% 2.5 milliGRAM(s) Nebulizer every 6 hours PRN Shortness of Breath  ondansetron Injectable 4 milliGRAM(s) IV Push every 6 hours PRN Nausea      Allergies    Cipro (Unknown)  contrast media (iodine-based) (Unknown)  penicillins (Unknown)  shellfish (Unknown)  Valium (Unknown)          Vital Signs Last 24 Hrs  T(C): 37.1 (22 Nov 2017 09:31), Max: 37.1 (21 Nov 2017 23:05)  T(F): 98.7 (22 Nov 2017 09:31), Max: 98.7 (21 Nov 2017 23:05)  HR: 71 (22 Nov 2017 09:31) (71 - 94)  BP: 109/64 (22 Nov 2017 09:31) (109/64 - 129/69)  BP(mean): --  RR: 18 (22 Nov 2017 09:31) (18 - 20)  SpO2: 94% (22 Nov 2017 09:31) (94% - 96%)    PHYSICAL EXAM:  GENERAL: NAD, generalized weakness  HEAD:  Atraumatic, Normocephalic  EYES: EOMI, PERRLA, conjunctiva and sclera clear  NECK: Supple, No JVD  NERVOUS SYSTEM:  Alert & Oriented X3, intact and symmetric  CHEST/LUNG: + chronic crackles  HEART: Irr no rub  ABDOMEN: Soft, Nontender + distension + BS  EXTREMITIES:  2+ Peripheral Pulses, Tr edema; R hand no erythema nor pain  LYMPH: No lymphadenopathy noted  SKIN: No rashes or lesions    LABS:                        10.5   10.0  )-----------( 417      ( 22 Nov 2017 08:56 )             34.1     11-22    136  |  92<L>  |  29.0<H>  ----------------------------<  122<H>  3.3<L>   |  31.0<H>  |  1.25    Ca    8.8      22 Nov 2017 08:56  Phos  2.6     11-21  Mg     2.3     11-21    Specimen Source: .Urine Clean Catch (Midstream) (11.20.17 @ 09:56)    Culture Results:   <10,000 CFU/ml Gram Negative Rods (11.20.17 @ 09:56)      Culture - Blood (11.20.17 @ 09:29)    Specimen Source: .Blood Blood-Peripheral    Culture Results:   No growth at 48 hours              RADIOLOGY & ADDITIONAL TESTS:  < from: CT Chest No Cont (11.20.17 @ 21:58) >     EXAM:  CT ABDOMEN AND PELVIS OC                         EXAM:  CT CHEST                          PROCEDURE DATE:  11/20/2017          INTERPRETATION:   CLINICAL INFORMATION:     Rule out perforation    COMPARISON: None    PROCEDURE: Contiguous axial scan of the chest, abdomen and pelvis with   oral contrast, but without intravenous contrast, followed by 2-D   reformation on sagittal and coronal planes. Axial MIP images were also   provided for further evaluation.      FINDINGS:    CHEST:     LUNGS AND LARGE AIRWAYS: Bronchiectasis with chronic inflammatory   sequelae in the left lower lobe. .  PLEURA: Small left pleural effusion..  VESSELS: Extensive atherosclerotic calcification of the thoracic aorta.   No evidence of dissection or aneurysmal dilatation.    HEART: Cardiomegaly.No pericardial effusion.  MEDIASTINUM AND RADHA: Several subcentimeter lymph nodes, likely reactive..  CHEST WALL AND LOWER NECK: Nodular enlargement of the thyroid..    ABDOMEN AND PELVIS:    LIVER: Within normal limits.  BILE DUCTS: Normal caliber.  GALLBLADDER: Cholecystectomy clips.   SPLEEN: Within normal limits.  PANCREAS: Within normal limits.  ADRENALS: Within normal limits.  KIDNEYS/URETERS: Mildly atrophic kidneys. No hydronephrosis or   urolithiasis.    BLADDER: Within normal limits.  REPRODUCTIVE ORGANS: Status post hysterectomy.    BOWEL: No bowel obstruction. Appendix  not visualized. Eccentric wall   thickening the 4 cm segment of the small bowel in the midline ( 5-49).   Lack of distentionof the sigmoid colon which limits the evaluation   (2-109).  PERITONEUM: No ascites. No free air. No focal fluid collection.  VESSELS:  Extensive atherosclerotic chest dictation..  RETROPERITONEUM: No lymphadenopathy.    ABDOMINAL WALL: Within normallimits.  BONES: Diffuse osteopenia. Old severe compression fracture of L1..    IMPRESSION:   Chest:  Bronchiectasis in the left lower lobe with chronic inflammatory change.   Cardiomegaly and extensive atherosclerotic calcification of the thoracic   aorta.    Abdomen:  Eccentric wall thickening of the small bowel in the midline as described.   Submucosal mass should be considered.  Lack of distention of the sigmoid colon with limited evaluation.  No evidence of perforation.    < end of copied text >

## 2017-11-22 NOTE — PROGRESS NOTE ADULT - SUBJECTIVE AND OBJECTIVE BOX
Feels well. No fevers overnight. Denies pain.    Exam:   Vital Signs Last 24 Hrs  T(C): 37.1 (22 Nov 2017 09:31), Max: 37.1 (21 Nov 2017 23:05)  T(F): 98.7 (22 Nov 2017 09:31), Max: 98.7 (21 Nov 2017 23:05)  HR: 71 (22 Nov 2017 09:31) (71 - 94)  BP: 109/64 (22 Nov 2017 09:31) (109/64 - 129/69)  RR: 18 (22 Nov 2017 09:31) (18 - 20)  SpO2: 94% (22 Nov 2017 09:31) (94% - 96%)    General: in no distress  Abdomen: soft, protuberant, non tender                          10.5   10.0  )-----------( 417      ( 22 Nov 2017 08:56 )             34.1   11-22    136  |  92<L>  |  29.0<H>  ----------------------------<  122<H>  3.3<L>   |  31.0<H>  |  1.25    Ca    8.8      22 Nov 2017 08:56  Phos  2.6     11-21  Mg     2.3     11-21

## 2017-11-22 NOTE — DIETITIAN INITIAL EVALUATION ADULT. - OTHER INFO
Unable to conduct pt interview secondary to pt with AMS. Pt currently on DASH/TLC diet with noted fluctuating PO intake Pt with IDDM and HbA1C: 7.6%.

## 2017-11-23 LAB
ANION GAP SERPL CALC-SCNC: 12 MMOL/L — SIGNIFICANT CHANGE UP (ref 5–17)
BASOPHILS # BLD AUTO: 0 K/UL — SIGNIFICANT CHANGE UP (ref 0–0.2)
BASOPHILS NFR BLD AUTO: 0.4 % — SIGNIFICANT CHANGE UP (ref 0–2)
BUN SERPL-MCNC: 39 MG/DL — HIGH (ref 8–20)
CALCIUM SERPL-MCNC: 9 MG/DL — SIGNIFICANT CHANGE UP (ref 8.6–10.2)
CHLORIDE SERPL-SCNC: 95 MMOL/L — LOW (ref 98–107)
CO2 SERPL-SCNC: 31 MMOL/L — HIGH (ref 22–29)
CREAT SERPL-MCNC: 1.49 MG/DL — HIGH (ref 0.5–1.3)
EOSINOPHIL # BLD AUTO: 0.3 K/UL — SIGNIFICANT CHANGE UP (ref 0–0.5)
EOSINOPHIL NFR BLD AUTO: 4 % — SIGNIFICANT CHANGE UP (ref 0–6)
GLUCOSE BLDC GLUCOMTR-MCNC: 116 MG/DL — HIGH (ref 70–99)
GLUCOSE BLDC GLUCOMTR-MCNC: 171 MG/DL — HIGH (ref 70–99)
GLUCOSE SERPL-MCNC: 115 MG/DL — SIGNIFICANT CHANGE UP (ref 70–115)
HCT VFR BLD CALC: 33.2 % — LOW (ref 37–47)
HGB BLD-MCNC: 10 G/DL — LOW (ref 12–16)
LYMPHOCYTES # BLD AUTO: 2.5 K/UL — SIGNIFICANT CHANGE UP (ref 1–4.8)
LYMPHOCYTES # BLD AUTO: 34.8 % — SIGNIFICANT CHANGE UP (ref 20–55)
MAGNESIUM SERPL-MCNC: 2.1 MG/DL — SIGNIFICANT CHANGE UP (ref 1.6–2.6)
MCHC RBC-ENTMCNC: 21.6 PG — LOW (ref 27–31)
MCHC RBC-ENTMCNC: 30.1 G/DL — LOW (ref 32–36)
MCV RBC AUTO: 71.9 FL — LOW (ref 81–99)
MONOCYTES # BLD AUTO: 1.2 K/UL — HIGH (ref 0–0.8)
MONOCYTES NFR BLD AUTO: 17.1 % — HIGH (ref 3–10)
NEUTROPHILS # BLD AUTO: 3.1 K/UL — SIGNIFICANT CHANGE UP (ref 1.8–8)
NEUTROPHILS NFR BLD AUTO: 43.6 % — SIGNIFICANT CHANGE UP (ref 37–73)
PHOSPHATE SERPL-MCNC: 4.1 MG/DL — SIGNIFICANT CHANGE UP (ref 2.4–4.7)
PLATELET # BLD AUTO: 434 K/UL — HIGH (ref 150–400)
POTASSIUM SERPL-MCNC: 3.5 MMOL/L — SIGNIFICANT CHANGE UP (ref 3.5–5.3)
POTASSIUM SERPL-SCNC: 3.5 MMOL/L — SIGNIFICANT CHANGE UP (ref 3.5–5.3)
RBC # BLD: 4.62 M/UL — SIGNIFICANT CHANGE UP (ref 4.4–5.2)
RBC # FLD: 19.5 % — HIGH (ref 11–15.6)
SODIUM SERPL-SCNC: 138 MMOL/L — SIGNIFICANT CHANGE UP (ref 135–145)
WBC # BLD: 7.2 K/UL — SIGNIFICANT CHANGE UP (ref 4.8–10.8)
WBC # FLD AUTO: 7.2 K/UL — SIGNIFICANT CHANGE UP (ref 4.8–10.8)

## 2017-11-23 RX ADMIN — LIDOCAINE 1 PATCH: 4 CREAM TOPICAL at 17:25

## 2017-11-23 RX ADMIN — Medication 1 TABLET(S): at 06:17

## 2017-11-23 RX ADMIN — Medication 50 MILLIGRAM(S): at 17:25

## 2017-11-23 RX ADMIN — Medication 1 CAPSULE(S): at 12:09

## 2017-11-23 RX ADMIN — Medication 40 MILLIGRAM(S): at 06:17

## 2017-11-23 RX ADMIN — Medication 500 MILLIGRAM(S): at 12:08

## 2017-11-23 RX ADMIN — Medication 1 TABLET(S): at 17:25

## 2017-11-23 RX ADMIN — Medication 1 CAPSULE(S): at 09:12

## 2017-11-23 RX ADMIN — Medication 25 MILLIGRAM(S): at 17:26

## 2017-11-23 RX ADMIN — Medication 20 MILLIEQUIVALENT(S): at 17:26

## 2017-11-23 RX ADMIN — Medication 25 MILLIGRAM(S): at 06:17

## 2017-11-23 RX ADMIN — CEFTRIAXONE 100 GRAM(S): 500 INJECTION, POWDER, FOR SOLUTION INTRAMUSCULAR; INTRAVENOUS at 09:15

## 2017-11-23 RX ADMIN — INSULIN GLARGINE 14 UNIT(S): 100 INJECTION, SOLUTION SUBCUTANEOUS at 09:22

## 2017-11-23 RX ADMIN — Medication 100 MILLIGRAM(S): at 12:09

## 2017-11-23 RX ADMIN — Medication 25 MILLIGRAM(S): at 17:28

## 2017-11-23 RX ADMIN — MIRTAZAPINE 15 MILLIGRAM(S): 45 TABLET, ORALLY DISINTEGRATING ORAL at 12:09

## 2017-11-23 RX ADMIN — Medication 650 MILLIGRAM(S): at 21:52

## 2017-11-23 RX ADMIN — Medication 50 MILLIGRAM(S): at 06:17

## 2017-11-23 RX ADMIN — Medication 240 MILLIGRAM(S): at 06:17

## 2017-11-23 RX ADMIN — Medication 650 MILLIGRAM(S): at 21:24

## 2017-11-23 RX ADMIN — HEPARIN SODIUM 5000 UNIT(S): 5000 INJECTION INTRAVENOUS; SUBCUTANEOUS at 17:25

## 2017-11-23 RX ADMIN — ONDANSETRON 4 MILLIGRAM(S): 8 TABLET, FILM COATED ORAL at 21:22

## 2017-11-23 RX ADMIN — Medication 20 MILLIEQUIVALENT(S): at 06:17

## 2017-11-23 RX ADMIN — POLYETHYLENE GLYCOL 3350 17 GRAM(S): 17 POWDER, FOR SOLUTION ORAL at 12:09

## 2017-11-23 RX ADMIN — ISOSORBIDE MONONITRATE 120 MILLIGRAM(S): 60 TABLET, EXTENDED RELEASE ORAL at 12:09

## 2017-11-23 RX ADMIN — HEPARIN SODIUM 5000 UNIT(S): 5000 INJECTION INTRAVENOUS; SUBCUTANEOUS at 06:17

## 2017-11-23 NOTE — PROGRESS NOTE ADULT - ASSESSMENT
CRF, ARF due to pre renal azotemia due to diuretics  (Pt well known to me from office)  Colonic neoplasm scheduled for OR when stable  Afib; CHF  COPD/ILD  DM  - continue to supplement K+   - monitor labs  - awaiting surgery to be rescheduled    R hand pain/erythema: likely gout==> improved clinically  Hyperuricemia noted (partially due to diuretics)  - holding metolazone for now  - added allopurinol; monitor serum uric acid    Fever: leukocytosis==> resolved and no further episodes  urine culture and blood culture negative  - continue Rocephin for now

## 2017-11-23 NOTE — PROGRESS NOTE ADULT - SUBJECTIVE AND OBJECTIVE BOX
NEPHROLOGY INTERVAL HPI/OVERNIGHT EVENTS:    Surgery follow up noted   Afebrile on Abx   Blood Cx (-) 11-20    MEDICATIONS  (STANDING):  allopurinol 100 milliGRAM(s) Oral daily  alvimopan 12 milliGRAM(s) Oral once  amylase/lipase/protease  (CREON  6,000 Units) 1 Capsule(s) Oral three times a day with meals  ascorbic acid 500 milliGRAM(s) Oral daily  calcium carbonate 1250 mG + Vitamin D (OsCal 500 + D) 1 Tablet(s) Oral two times a day  cefTRIAXone   IVPB 1 Gram(s) IV Intermittent every 24 hours  cefTRIAXone   IVPB      diltiazem    milliGRAM(s) Oral daily  heparin  Injectable 5000 Unit(s) SubCutaneous every 12 hours  hydrALAZINE 50 milliGRAM(s) Oral two times a day  insulin glargine Injectable (LANTUS) 14 Unit(s) SubCutaneous every morning  isosorbide   mononitrate ER Tablet (IMDUR) 120 milliGRAM(s) Oral daily  lidocaine   Patch 1 Patch Transdermal daily  methimazole 2.5 milliGRAM(s) Oral <User Schedule>  metoprolol     tartrate 25 milliGRAM(s) Oral two times a day  mirtazapine 15 milliGRAM(s) Oral daily  polyethylene glycol 3350 17 Gram(s) Oral daily  potassium chloride    Tablet ER 20 milliEquivalent(s) Oral two times a day  pregabalin 25 milliGRAM(s) Oral two times a day  tiotropium 18 MICROgram(s) Capsule 1 Capsule(s) Inhalation daily  torsemide 40 milliGRAM(s) Oral before breakfast    MEDICATIONS  (PRN):  acetaminophen   Tablet 650 milliGRAM(s) Oral every 6 hours PRN For Temp greater than 38 C (100.4 F)  acetaminophen   Tablet. 650 milliGRAM(s) Oral every 6 hours PRN Mild Pain (1 - 3)  ALBUTerol    0.083% 2.5 milliGRAM(s) Nebulizer every 6 hours PRN Shortness of Breath  ondansetron Injectable 4 milliGRAM(s) IV Push every 6 hours PRN Nausea      Allergies    Cipro (Unknown)  contrast media (iodine-based) (Unknown)  penicillins (Unknown)  shellfish (Unknown)  Valium (Unknown)    Intolerances        Vital Signs Last 24 Hrs  T(C): 37.2 (2017 23:55), Max: 37.2 (2017 23:55)  T(F): 99 (2017 23:55), Max: 99 (2017 23:55)  HR: 69 (2017 06:12) (69 - 79)  BP: 117/60 (2017 06:12) (109/64 - 118/67)  BP(mean): --  RR: 18 (2017 23:55) (18 - 18)  SpO2: 97% (2017 23:55) (94% - 97%)  Daily     Daily Weight in k.8 (2017 14:51)  I&O's Detail    I&O's Summary      PHYSICAL EXAM:  HEAD:  Atraumatic, Normocephalic  EYES: EOMI, PERRLA, conjunctiva and sclera clear  NECK: Supple, No JVD  NERVOUS SYSTEM:  Alert & Oriented X3, intact and symmetric  CHEST/LUNG: + chronic crackles  HEART: Irr no rub  ABDOMEN: Soft, Nontender + distension + BS  EXTREMITIES:  2+ Peripheral Pulses, Tr edema; R hand no erythema nor pain    LABS:                        10.5   10.0  )-----------( 417      ( 2017 08:56 )             34.1     11-22    136  |  92<L>  |  29.0<H>  ----------------------------<  122<H>  3.3<L>   |  31.0<H>  |  1.25    Ca    8.8      2017 08:56  Phos  2.6     11-21  Mg     2.3     -21                  RADIOLOGY & ADDITIONAL TESTS:

## 2017-11-23 NOTE — PROGRESS NOTE ADULT - ASSESSMENT
89F c sigmoid colon cancer, code sepsis, fever and leukocytosis on this hospital admission  Neuro: Pain control as prescribed   Cardio: Monitor Vital signs, cont meds  Pulm: Incentive spirometry and deep breathing  GI: Diet: DASH/TLC; Monitor bowel function; Bowel regimen   : monitor urine output   MS: Encourage OOB and ambulation, PT consult  ID: cont ceftriaxone  DVT ppx: SQH  Awaiting surgical plan from Dr. Flowers  monitor for fevers   WBC count down trending, will cont to trend  K+ replaced.

## 2017-11-23 NOTE — PROGRESS NOTE ADULT - SUBJECTIVE AND OBJECTIVE BOX
INTERVAL HPI/OVERNIGHT EVENTS:  Pt seen and examined at the bedside resting comfortably eating her breakfast.  Pt states that she has no pain and has been tolerating her diet.      SUBJECTIVE:  Flatus: [ ] YES [x ] NO             Bowel Movement: [ ] YES [x ] NO  Pain (0-10):            Pain Control Adequate: [ x] YES [ ] NO  Nausea: [ ] YES [x ] NO            Vomiting: [ ] YES [ x] NO  Diarrhea: [ ] YES [x ] NO         Constipation: [ ] YES [ x] NO     Chest Pain: [ ] YES [x ] NO    SOB:  [ ] YES [x ] NO    MEDICATIONS  (STANDING):  allopurinol 100 milliGRAM(s) Oral daily  alvimopan 12 milliGRAM(s) Oral once  amylase/lipase/protease  (CREON  6,000 Units) 1 Capsule(s) Oral three times a day with meals  ascorbic acid 500 milliGRAM(s) Oral daily  calcium carbonate 1250 mG + Vitamin D (OsCal 500 + D) 1 Tablet(s) Oral two times a day  cefTRIAXone   IVPB 1 Gram(s) IV Intermittent every 24 hours  cefTRIAXone   IVPB      diltiazem    milliGRAM(s) Oral daily  heparin  Injectable 5000 Unit(s) SubCutaneous every 12 hours  hydrALAZINE 50 milliGRAM(s) Oral two times a day  insulin glargine Injectable (LANTUS) 14 Unit(s) SubCutaneous every morning  isosorbide   mononitrate ER Tablet (IMDUR) 120 milliGRAM(s) Oral daily  lidocaine   Patch 1 Patch Transdermal daily  methimazole 2.5 milliGRAM(s) Oral <User Schedule>  metoprolol     tartrate 25 milliGRAM(s) Oral two times a day  mirtazapine 15 milliGRAM(s) Oral daily  polyethylene glycol 3350 17 Gram(s) Oral daily  potassium chloride    Tablet ER 20 milliEquivalent(s) Oral two times a day  pregabalin 25 milliGRAM(s) Oral two times a day  tiotropium 18 MICROgram(s) Capsule 1 Capsule(s) Inhalation daily  torsemide 40 milliGRAM(s) Oral before breakfast    MEDICATIONS  (PRN):  acetaminophen   Tablet 650 milliGRAM(s) Oral every 6 hours PRN For Temp greater than 38 C (100.4 F)  acetaminophen   Tablet. 650 milliGRAM(s) Oral every 6 hours PRN Mild Pain (1 - 3)  ALBUTerol    0.083% 2.5 milliGRAM(s) Nebulizer every 6 hours PRN Shortness of Breath  ondansetron Injectable 4 milliGRAM(s) IV Push every 6 hours PRN Nausea      Vital Signs Last 24 Hrs  T(C): 36.7 (23 Nov 2017 10:39), Max: 37.2 (22 Nov 2017 23:55)  T(F): 98 (23 Nov 2017 10:39), Max: 99 (22 Nov 2017 23:55)  HR: 69 (23 Nov 2017 10:39) (69 - 79)  BP: 125/66 (23 Nov 2017 10:39) (116/64 - 125/66)  BP(mean): --  RR: 18 (23 Nov 2017 10:39) (18 - 18)  SpO2: 97% (23 Nov 2017 10:39) (97% - 97%)    PHYSICAL EXAM:    Constitutional: NAD, resting comfortably, WNWD, hard of hearing  Eyes: PERRLA, EOM intact   Head: NCAT  Neck: trachea midline, FROM  Respiratory: CTA b/l, no WRR  Gastrointestinal: abd. soft, protuberant, NT  Extremities: no LE edema b/l  Neurological: AOx3, sensation grossly intact  Skin: warm, dry, intact     LABS:                        10.0   7.2   )-----------( 434      ( 23 Nov 2017 08:52 )             33.2     11-23    138  |  95<L>  |  39.0<H>  ----------------------------<  115  3.5   |  31.0<H>  |  1.49<H>    Ca    9.0      23 Nov 2017 08:52  Phos  4.1     11-23  Mg     2.1     11-23

## 2017-11-24 LAB
ANION GAP SERPL CALC-SCNC: 13 MMOL/L — SIGNIFICANT CHANGE UP (ref 5–17)
BUN SERPL-MCNC: 47 MG/DL — HIGH (ref 8–20)
CALCIUM SERPL-MCNC: 9.3 MG/DL — SIGNIFICANT CHANGE UP (ref 8.6–10.2)
CHLORIDE SERPL-SCNC: 94 MMOL/L — LOW (ref 98–107)
CO2 SERPL-SCNC: 31 MMOL/L — HIGH (ref 22–29)
CREAT SERPL-MCNC: 1.57 MG/DL — HIGH (ref 0.5–1.3)
GLUCOSE BLDC GLUCOMTR-MCNC: 112 MG/DL — HIGH (ref 70–99)
GLUCOSE BLDC GLUCOMTR-MCNC: 161 MG/DL — HIGH (ref 70–99)
GLUCOSE BLDC GLUCOMTR-MCNC: 165 MG/DL — HIGH (ref 70–99)
GLUCOSE BLDC GLUCOMTR-MCNC: 245 MG/DL — HIGH (ref 70–99)
GLUCOSE SERPL-MCNC: 123 MG/DL — HIGH (ref 70–115)
HCT VFR BLD CALC: 33.5 % — LOW (ref 37–47)
HGB BLD-MCNC: 9.8 G/DL — LOW (ref 12–16)
MAGNESIUM SERPL-MCNC: 2.2 MG/DL — SIGNIFICANT CHANGE UP (ref 1.6–2.6)
MCHC RBC-ENTMCNC: 21.4 PG — LOW (ref 27–31)
MCHC RBC-ENTMCNC: 29.3 G/DL — LOW (ref 32–36)
MCV RBC AUTO: 73.1 FL — LOW (ref 81–99)
PLATELET # BLD AUTO: 441 K/UL — HIGH (ref 150–400)
POTASSIUM SERPL-MCNC: 3.9 MMOL/L — SIGNIFICANT CHANGE UP (ref 3.5–5.3)
POTASSIUM SERPL-SCNC: 3.9 MMOL/L — SIGNIFICANT CHANGE UP (ref 3.5–5.3)
RBC # BLD: 4.58 M/UL — SIGNIFICANT CHANGE UP (ref 4.4–5.2)
RBC # FLD: 19.9 % — HIGH (ref 11–15.6)
SODIUM SERPL-SCNC: 138 MMOL/L — SIGNIFICANT CHANGE UP (ref 135–145)
WBC # BLD: 7.9 K/UL — SIGNIFICANT CHANGE UP (ref 4.8–10.8)
WBC # FLD AUTO: 7.9 K/UL — SIGNIFICANT CHANGE UP (ref 4.8–10.8)

## 2017-11-24 RX ORDER — SODIUM CHLORIDE 9 MG/ML
1000 INJECTION, SOLUTION INTRAVENOUS
Qty: 0 | Refills: 0 | Status: DISCONTINUED | OUTPATIENT
Start: 2017-11-24 | End: 2017-11-26

## 2017-11-24 RX ORDER — NEOMYCIN SULFATE 500 MG/1
1000 TABLET ORAL
Qty: 0 | Refills: 0 | Status: COMPLETED | OUTPATIENT
Start: 2017-11-25 | End: 2017-11-25

## 2017-11-24 RX ORDER — DEXTROSE 50 % IN WATER 50 %
25 SYRINGE (ML) INTRAVENOUS ONCE
Qty: 0 | Refills: 0 | Status: DISCONTINUED | OUTPATIENT
Start: 2017-11-24 | End: 2017-11-26

## 2017-11-24 RX ORDER — SOD SULF/SODIUM/NAHCO3/KCL/PEG
4000 SOLUTION, RECONSTITUTED, ORAL ORAL ONCE
Qty: 0 | Refills: 0 | Status: COMPLETED | OUTPATIENT
Start: 2017-11-25 | End: 2017-11-25

## 2017-11-24 RX ORDER — METRONIDAZOLE 500 MG
500 TABLET ORAL
Qty: 0 | Refills: 0 | Status: COMPLETED | OUTPATIENT
Start: 2017-11-25 | End: 2017-11-25

## 2017-11-24 RX ORDER — INSULIN LISPRO 100/ML
VIAL (ML) SUBCUTANEOUS
Qty: 0 | Refills: 0 | Status: DISCONTINUED | OUTPATIENT
Start: 2017-11-24 | End: 2017-11-26

## 2017-11-24 RX ORDER — DEXTROSE 50 % IN WATER 50 %
1 SYRINGE (ML) INTRAVENOUS ONCE
Qty: 0 | Refills: 0 | Status: DISCONTINUED | OUTPATIENT
Start: 2017-11-24 | End: 2017-11-26

## 2017-11-24 RX ORDER — DEXTROSE 50 % IN WATER 50 %
12.5 SYRINGE (ML) INTRAVENOUS ONCE
Qty: 0 | Refills: 0 | Status: DISCONTINUED | OUTPATIENT
Start: 2017-11-24 | End: 2017-11-26

## 2017-11-24 RX ORDER — GLUCAGON INJECTION, SOLUTION 0.5 MG/.1ML
1 INJECTION, SOLUTION SUBCUTANEOUS ONCE
Qty: 0 | Refills: 0 | Status: DISCONTINUED | OUTPATIENT
Start: 2017-11-24 | End: 2017-11-26

## 2017-11-24 RX ADMIN — Medication 100 MILLIGRAM(S): at 15:59

## 2017-11-24 RX ADMIN — Medication 1: at 15:59

## 2017-11-24 RX ADMIN — Medication 1 TABLET(S): at 18:40

## 2017-11-24 RX ADMIN — Medication 650 MILLIGRAM(S): at 20:18

## 2017-11-24 RX ADMIN — HEPARIN SODIUM 5000 UNIT(S): 5000 INJECTION INTRAVENOUS; SUBCUTANEOUS at 06:57

## 2017-11-24 RX ADMIN — CEFTRIAXONE 100 GRAM(S): 500 INJECTION, POWDER, FOR SOLUTION INTRAMUSCULAR; INTRAVENOUS at 12:06

## 2017-11-24 RX ADMIN — Medication 25 MILLIGRAM(S): at 06:49

## 2017-11-24 RX ADMIN — Medication 1 CAPSULE(S): at 18:40

## 2017-11-24 RX ADMIN — Medication 500 MILLIGRAM(S): at 12:07

## 2017-11-24 RX ADMIN — MIRTAZAPINE 15 MILLIGRAM(S): 45 TABLET, ORALLY DISINTEGRATING ORAL at 12:07

## 2017-11-24 RX ADMIN — Medication 20 MILLIEQUIVALENT(S): at 18:40

## 2017-11-24 RX ADMIN — Medication 20 MILLIEQUIVALENT(S): at 06:49

## 2017-11-24 RX ADMIN — INSULIN GLARGINE 14 UNIT(S): 100 INJECTION, SOLUTION SUBCUTANEOUS at 12:06

## 2017-11-24 RX ADMIN — POLYETHYLENE GLYCOL 3350 17 GRAM(S): 17 POWDER, FOR SOLUTION ORAL at 15:59

## 2017-11-24 RX ADMIN — TIOTROPIUM BROMIDE 1 CAPSULE(S): 18 CAPSULE ORAL; RESPIRATORY (INHALATION) at 09:20

## 2017-11-24 RX ADMIN — LIDOCAINE 1 PATCH: 4 CREAM TOPICAL at 18:41

## 2017-11-24 RX ADMIN — Medication 240 MILLIGRAM(S): at 06:49

## 2017-11-24 RX ADMIN — ISOSORBIDE MONONITRATE 120 MILLIGRAM(S): 60 TABLET, EXTENDED RELEASE ORAL at 12:08

## 2017-11-24 RX ADMIN — Medication 50 MILLIGRAM(S): at 18:40

## 2017-11-24 RX ADMIN — Medication 40 MILLIGRAM(S): at 06:50

## 2017-11-24 RX ADMIN — Medication 50 MILLIGRAM(S): at 06:49

## 2017-11-24 RX ADMIN — HEPARIN SODIUM 5000 UNIT(S): 5000 INJECTION INTRAVENOUS; SUBCUTANEOUS at 18:39

## 2017-11-24 RX ADMIN — Medication 650 MILLIGRAM(S): at 20:48

## 2017-11-24 NOTE — PROGRESS NOTE ADULT - ASSESSMENT
89F c sigmoid colon cancer, code sepsis, fever and leukocytosis on this hospital admission  Neuro: Pain control as prescribed   Cardio: Monitor Vital signs, cont meds  Pulm: Incentive spirometry and deep breathing  GI: Diet: DASH/TLC; Monitor bowel function; Bowel regimen   : monitor urine output and trend creatinine  MS: Encourage OOB and ambulation, PT consult pending  ID: cont ceftriaxone  DVT ppx: SQH  Plan for OR Sunday 11/26  monitor for fevers   AM labs pending 89F c sigmoid colon cancer, code sepsis, fever and leukocytosis on this hospital admission  Neuro: Pain control as prescribed   Cardio: Monitor Vital signs, cont meds  Pulm: Incentive spirometry and deep breathing  GI: Diet: DASH/TLC; Monitor bowel function; Bowel regimen   : monitor urine output and trend creatinine  MS: Encourage OOB and ambulation, PT consult pending  ID: cont ceftriaxone  Endo: starting ISS in conjunction with lantus  DVT ppx: SQH  Plan for OR Sunday 11/26  monitor for fevers   Kidney fxn cont to rise, will hold torsemide tomorrow

## 2017-11-24 NOTE — PROGRESS NOTE ADULT - SUBJECTIVE AND OBJECTIVE BOX
NEPHROLOGY INTERVAL HPI/OVERNIGHT EVENTS:    Examined earlier  Looks comfortable   at bedside    MEDICATIONS  (STANDING):  allopurinol 100 milliGRAM(s) Oral daily  alvimopan 12 milliGRAM(s) Oral once  amylase/lipase/protease  (CREON  6,000 Units) 1 Capsule(s) Oral three times a day with meals  ascorbic acid 500 milliGRAM(s) Oral daily  calcium carbonate 1250 mG + Vitamin D (OsCal 500 + D) 1 Tablet(s) Oral two times a day  cefTRIAXone   IVPB 1 Gram(s) IV Intermittent every 24 hours  cefTRIAXone   IVPB      diltiazem    milliGRAM(s) Oral daily  heparin  Injectable 5000 Unit(s) SubCutaneous every 12 hours  hydrALAZINE 50 milliGRAM(s) Oral two times a day  insulin glargine Injectable (LANTUS) 14 Unit(s) SubCutaneous every morning  isosorbide   mononitrate ER Tablet (IMDUR) 120 milliGRAM(s) Oral daily  lidocaine   Patch 1 Patch Transdermal daily  methimazole 2.5 milliGRAM(s) Oral <User Schedule>  metoprolol     tartrate 25 milliGRAM(s) Oral two times a day  mirtazapine 15 milliGRAM(s) Oral daily  polyethylene glycol 3350 17 Gram(s) Oral daily  potassium chloride    Tablet ER 20 milliEquivalent(s) Oral two times a day  tiotropium 18 MICROgram(s) Capsule 1 Capsule(s) Inhalation daily  torsemide 40 milliGRAM(s) Oral before breakfast    MEDICATIONS  (PRN):  acetaminophen   Tablet 650 milliGRAM(s) Oral every 6 hours PRN For Temp greater than 38 C (100.4 F)  acetaminophen   Tablet. 650 milliGRAM(s) Oral every 6 hours PRN Mild Pain (1 - 3)  ALBUTerol    0.083% 2.5 milliGRAM(s) Nebulizer every 6 hours PRN Shortness of Breath  ondansetron Injectable 4 milliGRAM(s) IV Push every 6 hours PRN Nausea      Allergies    Cipro (Unknown)  contrast media (iodine-based) (Unknown)  penicillins (Unknown)  shellfish (Unknown)  Valium (Unknown)    Intolerances        Vital Signs Last 24 Hrs  T(C): 37.1 (23 Nov 2017 23:28), Max: 37.1 (23 Nov 2017 23:28)  T(F): 98.8 (23 Nov 2017 23:28), Max: 98.8 (23 Nov 2017 23:28)  HR: 85 (23 Nov 2017 23:28) (70 - 85)  BP: 106/45 (23 Nov 2017 23:28) (106/45 - 126/65)  BP(mean): --  RR: 20 (23 Nov 2017 23:28) (18 - 20)  SpO2: 98% (23 Nov 2017 23:28) (98% - 98%)  Daily Height in cm: 157.48 (24 Nov 2017 08:52)    Daily     PHYSICAL EXAM:  HEAD:  Atraumatic, Normocephalic  EYES: EOMI, PERRLA, conjunctiva and sclera clear  NECK: Supple, No JVD  NERVOUS SYSTEM:  Alert & Oriented X3, intact and symmetric  CHEST/LUNG: + chronic crackles  HEART: Irr no rub  ABDOMEN: Soft, Nontender + distension + BS  EXTREMITIES:  2+ Peripheral Pulses, Tr edema; R hand no erythema nor pain    LABS:                        9.8    7.9   )-----------( 441      ( 24 Nov 2017 07:38 )             33.5     11-24    138  |  94<L>  |  47.0<H>  ----------------------------<  123<H>  3.9   |  31.0<H>  |  1.57<H>    Ca    9.3      24 Nov 2017 07:38  Phos  4.1     11-23  Mg     2.2     11-24          Magnesium, Serum: 2.2 mg/dL (11-24 @ 07:38)          RADIOLOGY & ADDITIONAL TESTS:

## 2017-11-24 NOTE — PROGRESS NOTE ADULT - ASSESSMENT
MILAN on CKD 2/2 pre renal azotemia due to diuretics  may need to tolerate some degree of azotemia  cr increased today if cont to rise may need to dec diuretics  (Pt follows w Dr Barbosa in office)  CT abd pelvis 11/20 no hydro   Colonic neoplasm scheduled for OR when stable  has h/o: Afib/ CHF/ COPD/ ILD/ DM  - continue to supplement K+   - monitor labs  - awaiting surgery to be rescheduled    R hand pain/erythema: likely gout==> improved clinically  Hyperuricemia noted (partially due to diuretics)  - holding metolazone for now  - added allopurinol; monitor serum uric acid    Fever: leukocytosis==> resolved and no further episodes  urine culture and blood culture negative  - continue Rocephin for now

## 2017-11-24 NOTE — PROGRESS NOTE ADULT - SUBJECTIVE AND OBJECTIVE BOX
INTERVAL HPI/OVERNIGHT EVENTS:  Pt seen and examined at the bedside resting comfortably.  No overnight events or pain.  Pt is hard of hearing without her hearing aids in.  Pt reports one BM overnight of loose consistency.     SUBJECTIVE:  Flatus: [ x] YES [ ] NO             Bowel Movement: [x ] YES [ ] NO  Pain (0-10):            Pain Control Adequate: [x ] YES [ ] NO  Nausea: [ ] YES [x ] NO            Vomiting: [ ] YES [x ] NO  Diarrhea: [ x] YES [ ] NO         Constipation: [ ] YES [x ] NO     Chest Pain: [ ] YES [x ] NO    SOB:  [ ] YES [ x] NO    MEDICATIONS  (STANDING):  allopurinol 100 milliGRAM(s) Oral daily  alvimopan 12 milliGRAM(s) Oral once  amylase/lipase/protease  (CREON  6,000 Units) 1 Capsule(s) Oral three times a day with meals  ascorbic acid 500 milliGRAM(s) Oral daily  calcium carbonate 1250 mG + Vitamin D (OsCal 500 + D) 1 Tablet(s) Oral two times a day  cefTRIAXone   IVPB 1 Gram(s) IV Intermittent every 24 hours  cefTRIAXone   IVPB      diltiazem    milliGRAM(s) Oral daily  heparin  Injectable 5000 Unit(s) SubCutaneous every 12 hours  hydrALAZINE 50 milliGRAM(s) Oral two times a day  insulin glargine Injectable (LANTUS) 14 Unit(s) SubCutaneous every morning  isosorbide   mononitrate ER Tablet (IMDUR) 120 milliGRAM(s) Oral daily  lidocaine   Patch 1 Patch Transdermal daily  methimazole 2.5 milliGRAM(s) Oral <User Schedule>  metoprolol     tartrate 25 milliGRAM(s) Oral two times a day  mirtazapine 15 milliGRAM(s) Oral daily  polyethylene glycol 3350 17 Gram(s) Oral daily  potassium chloride    Tablet ER 20 milliEquivalent(s) Oral two times a day  tiotropium 18 MICROgram(s) Capsule 1 Capsule(s) Inhalation daily  torsemide 40 milliGRAM(s) Oral before breakfast    MEDICATIONS  (PRN):  acetaminophen   Tablet 650 milliGRAM(s) Oral every 6 hours PRN For Temp greater than 38 C (100.4 F)  acetaminophen   Tablet. 650 milliGRAM(s) Oral every 6 hours PRN Mild Pain (1 - 3)  ALBUTerol    0.083% 2.5 milliGRAM(s) Nebulizer every 6 hours PRN Shortness of Breath  ondansetron Injectable 4 milliGRAM(s) IV Push every 6 hours PRN Nausea      Vital Signs Last 24 Hrs  T(C): 37.1 (23 Nov 2017 23:28), Max: 37.1 (23 Nov 2017 23:28)  T(F): 98.8 (23 Nov 2017 23:28), Max: 98.8 (23 Nov 2017 23:28)  HR: 85 (23 Nov 2017 23:28) (69 - 85)  BP: 106/45 (23 Nov 2017 23:28) (106/45 - 126/65)  BP(mean): --  RR: 20 (23 Nov 2017 23:28) (18 - 20)  SpO2: 98% (23 Nov 2017 23:28) (97% - 98%)    PHYSICAL EXAM    Constitutional: NAD, resting comfortably, WNWD  Eyes: PERRLA, EOM intact   Head: NCAT  Neck: trachea midline, FROM  Respiratory: CTA b/l, no WRR  Cardiovascular: S1S2, RRR  Gastrointestinal: abd. soft, protuberant, NT  Extremities: no LE edema b/l  Neurological: AOx2, forgets conversation from yesterday regarding surgery, sensation grossly intact  Skin: warm, dry, intact     LABS:               Pending.

## 2017-11-25 LAB
ANION GAP SERPL CALC-SCNC: 11 MMOL/L — SIGNIFICANT CHANGE UP (ref 5–17)
BASOPHILS # BLD AUTO: 0 K/UL — SIGNIFICANT CHANGE UP (ref 0–0.2)
BASOPHILS NFR BLD AUTO: 0.5 % — SIGNIFICANT CHANGE UP (ref 0–2)
BLD GP AB SCN SERPL QL: SIGNIFICANT CHANGE UP
BUN SERPL-MCNC: 44 MG/DL — HIGH (ref 8–20)
CALCIUM SERPL-MCNC: 9.2 MG/DL — SIGNIFICANT CHANGE UP (ref 8.6–10.2)
CHLORIDE SERPL-SCNC: 95 MMOL/L — LOW (ref 98–107)
CO2 SERPL-SCNC: 33 MMOL/L — HIGH (ref 22–29)
CREAT SERPL-MCNC: 1.56 MG/DL — HIGH (ref 0.5–1.3)
CULTURE RESULTS: SIGNIFICANT CHANGE UP
CULTURE RESULTS: SIGNIFICANT CHANGE UP
EOSINOPHIL # BLD AUTO: 0.3 K/UL — SIGNIFICANT CHANGE UP (ref 0–0.5)
EOSINOPHIL NFR BLD AUTO: 4.3 % — SIGNIFICANT CHANGE UP (ref 0–6)
GLUCOSE BLDC GLUCOMTR-MCNC: 101 MG/DL — HIGH (ref 70–99)
GLUCOSE BLDC GLUCOMTR-MCNC: 138 MG/DL — HIGH (ref 70–99)
GLUCOSE BLDC GLUCOMTR-MCNC: 197 MG/DL — HIGH (ref 70–99)
GLUCOSE BLDC GLUCOMTR-MCNC: 249 MG/DL — HIGH (ref 70–99)
GLUCOSE SERPL-MCNC: 82 MG/DL — SIGNIFICANT CHANGE UP (ref 70–115)
HCT VFR BLD CALC: 32.3 % — LOW (ref 37–47)
HGB BLD-MCNC: 9.6 G/DL — LOW (ref 12–16)
LYMPHOCYTES # BLD AUTO: 2.6 K/UL — SIGNIFICANT CHANGE UP (ref 1–4.8)
LYMPHOCYTES # BLD AUTO: 33.2 % — SIGNIFICANT CHANGE UP (ref 20–55)
MAGNESIUM SERPL-MCNC: 2 MG/DL — SIGNIFICANT CHANGE UP (ref 1.8–2.6)
MCHC RBC-ENTMCNC: 21.7 PG — LOW (ref 27–31)
MCHC RBC-ENTMCNC: 29.7 G/DL — LOW (ref 32–36)
MCV RBC AUTO: 73.1 FL — LOW (ref 81–99)
MONOCYTES # BLD AUTO: 1.2 K/UL — HIGH (ref 0–0.8)
MONOCYTES NFR BLD AUTO: 14.8 % — HIGH (ref 3–10)
NEUTROPHILS # BLD AUTO: 3.7 K/UL — SIGNIFICANT CHANGE UP (ref 1.8–8)
NEUTROPHILS NFR BLD AUTO: 46.9 % — SIGNIFICANT CHANGE UP (ref 37–73)
PHOSPHATE SERPL-MCNC: 3.8 MG/DL — SIGNIFICANT CHANGE UP (ref 2.4–4.7)
PLATELET # BLD AUTO: 439 K/UL — HIGH (ref 150–400)
POTASSIUM SERPL-MCNC: 3.8 MMOL/L — SIGNIFICANT CHANGE UP (ref 3.5–5.3)
POTASSIUM SERPL-SCNC: 3.8 MMOL/L — SIGNIFICANT CHANGE UP (ref 3.5–5.3)
RBC # BLD: 4.42 M/UL — SIGNIFICANT CHANGE UP (ref 4.4–5.2)
RBC # FLD: 19.6 % — HIGH (ref 11–15.6)
SODIUM SERPL-SCNC: 139 MMOL/L — SIGNIFICANT CHANGE UP (ref 135–145)
SPECIMEN SOURCE: SIGNIFICANT CHANGE UP
SPECIMEN SOURCE: SIGNIFICANT CHANGE UP
TYPE + AB SCN PNL BLD: SIGNIFICANT CHANGE UP
WBC # BLD: 7.9 K/UL — SIGNIFICANT CHANGE UP (ref 4.8–10.8)
WBC # FLD AUTO: 7.9 K/UL — SIGNIFICANT CHANGE UP (ref 4.8–10.8)

## 2017-11-25 PROCEDURE — 93010 ELECTROCARDIOGRAM REPORT: CPT

## 2017-11-25 RX ORDER — SODIUM CHLORIDE 9 MG/ML
1000 INJECTION INTRAMUSCULAR; INTRAVENOUS; SUBCUTANEOUS
Qty: 0 | Refills: 0 | Status: DISCONTINUED | OUTPATIENT
Start: 2017-11-25 | End: 2017-11-26

## 2017-11-25 RX ORDER — HYDROCORTISONE 1 %
1 OINTMENT (GRAM) TOPICAL ONCE
Qty: 0 | Refills: 0 | Status: COMPLETED | OUTPATIENT
Start: 2017-11-25 | End: 2017-11-25

## 2017-11-25 RX ADMIN — LIDOCAINE 1 PATCH: 4 CREAM TOPICAL at 05:57

## 2017-11-25 RX ADMIN — NEOMYCIN SULFATE 1000 MILLIGRAM(S): 500 TABLET ORAL at 22:04

## 2017-11-25 RX ADMIN — Medication 1 SUPPOSITORY(S): at 22:03

## 2017-11-25 RX ADMIN — Medication 4000 MILLILITER(S): at 13:12

## 2017-11-25 RX ADMIN — Medication 500 MILLIGRAM(S): at 12:44

## 2017-11-25 RX ADMIN — Medication 1 CAPSULE(S): at 16:31

## 2017-11-25 RX ADMIN — INSULIN GLARGINE 14 UNIT(S): 100 INJECTION, SOLUTION SUBCUTANEOUS at 07:56

## 2017-11-25 RX ADMIN — CEFTRIAXONE 100 GRAM(S): 500 INJECTION, POWDER, FOR SOLUTION INTRAMUSCULAR; INTRAVENOUS at 09:22

## 2017-11-25 RX ADMIN — Medication 100 MILLIGRAM(S): at 12:44

## 2017-11-25 RX ADMIN — Medication 1 TABLET(S): at 19:14

## 2017-11-25 RX ADMIN — SODIUM CHLORIDE 50 MILLILITER(S): 9 INJECTION INTRAMUSCULAR; INTRAVENOUS; SUBCUTANEOUS at 19:15

## 2017-11-25 RX ADMIN — Medication 500 MILLIGRAM(S): at 13:12

## 2017-11-25 RX ADMIN — MIRTAZAPINE 15 MILLIGRAM(S): 45 TABLET, ORALLY DISINTEGRATING ORAL at 12:44

## 2017-11-25 RX ADMIN — NEOMYCIN SULFATE 1000 MILLIGRAM(S): 500 TABLET ORAL at 13:14

## 2017-11-25 RX ADMIN — Medication 50 MILLIGRAM(S): at 19:14

## 2017-11-25 RX ADMIN — NEOMYCIN SULFATE 1000 MILLIGRAM(S): 500 TABLET ORAL at 16:33

## 2017-11-25 RX ADMIN — Medication 20 MILLIEQUIVALENT(S): at 05:56

## 2017-11-25 RX ADMIN — Medication 500 MILLIGRAM(S): at 16:28

## 2017-11-25 RX ADMIN — LIDOCAINE 1 PATCH: 4 CREAM TOPICAL at 19:13

## 2017-11-25 RX ADMIN — Medication 1 CAPSULE(S): at 07:57

## 2017-11-25 RX ADMIN — Medication 25 MILLIGRAM(S): at 19:14

## 2017-11-25 RX ADMIN — Medication 240 MILLIGRAM(S): at 05:56

## 2017-11-25 RX ADMIN — ISOSORBIDE MONONITRATE 120 MILLIGRAM(S): 60 TABLET, EXTENDED RELEASE ORAL at 12:44

## 2017-11-25 RX ADMIN — Medication 1 TABLET(S): at 05:55

## 2017-11-25 RX ADMIN — Medication 25 MILLIGRAM(S): at 05:56

## 2017-11-25 RX ADMIN — TIOTROPIUM BROMIDE 1 CAPSULE(S): 18 CAPSULE ORAL; RESPIRATORY (INHALATION) at 09:29

## 2017-11-25 RX ADMIN — Medication 50 MILLIGRAM(S): at 05:56

## 2017-11-25 RX ADMIN — HEPARIN SODIUM 5000 UNIT(S): 5000 INJECTION INTRAVENOUS; SUBCUTANEOUS at 19:14

## 2017-11-25 RX ADMIN — Medication 20 MILLIEQUIVALENT(S): at 19:14

## 2017-11-25 RX ADMIN — ALBUTEROL 2.5 MILLIGRAM(S): 90 AEROSOL, METERED ORAL at 20:34

## 2017-11-25 RX ADMIN — Medication 500 MILLIGRAM(S): at 22:03

## 2017-11-25 RX ADMIN — Medication 2: at 12:44

## 2017-11-25 RX ADMIN — HEPARIN SODIUM 5000 UNIT(S): 5000 INJECTION INTRAVENOUS; SUBCUTANEOUS at 05:54

## 2017-11-25 RX ADMIN — Medication 40 MILLIGRAM(S): at 05:57

## 2017-11-25 NOTE — PROGRESS NOTE ADULT - SUBJECTIVE AND OBJECTIVE BOX
INTERVAL HPI/OVERNIGHT EVENTS:  Pt seen and examined at the bedside. No acute events ON, pt offers no complaints.  Was switched to a CLD yesterday in preparation for surgery tomorrow.  Pt will be having the rest of her bowel preparation today.      SUBJECTIVE:  Flatus: [ x] YES [ ] NO             Bowel Movement: [x ] YES [ ] NO  Pain (0-10):            Pain Control Adequate: [x ] YES [ ] NO  Nausea: [ ] YES [x ] NO            Vomiting: [ ] YES [x ] NO  Diarrhea: [ ] YES [ x] NO         Constipation: [ ] YES [x ] NO     Chest Pain: [ ] YES [x ] NO    SOB:  [ ] YES [x ] NO    MEDICATIONS  (STANDING):  allopurinol 100 milliGRAM(s) Oral daily  alvimopan 12 milliGRAM(s) Oral once  amylase/lipase/protease  (CREON  6,000 Units) 1 Capsule(s) Oral three times a day with meals  ascorbic acid 500 milliGRAM(s) Oral daily  calcium carbonate 1250 mG + Vitamin D (OsCal 500 + D) 1 Tablet(s) Oral two times a day  cefTRIAXone   IVPB 1 Gram(s) IV Intermittent every 24 hours  cefTRIAXone   IVPB      dextrose 5%. 1000 milliLiter(s) (50 mL/Hr) IV Continuous <Continuous>  dextrose 50% Injectable 12.5 Gram(s) IV Push once  dextrose 50% Injectable 25 Gram(s) IV Push once  dextrose 50% Injectable 25 Gram(s) IV Push once  diltiazem    milliGRAM(s) Oral daily  heparin  Injectable 5000 Unit(s) SubCutaneous every 12 hours  hydrALAZINE 50 milliGRAM(s) Oral two times a day  insulin glargine Injectable (LANTUS) 14 Unit(s) SubCutaneous every morning  insulin lispro (HumaLOG) corrective regimen sliding scale   SubCutaneous three times a day before meals  isosorbide   mononitrate ER Tablet (IMDUR) 120 milliGRAM(s) Oral daily  lidocaine   Patch 1 Patch Transdermal daily  methimazole 2.5 milliGRAM(s) Oral <User Schedule>  metoprolol     tartrate 25 milliGRAM(s) Oral two times a day  metroNIDAZOLE    Tablet 500 milliGRAM(s) Oral <User Schedule>  mirtazapine 15 milliGRAM(s) Oral daily  neomycin 1000 milliGRAM(s) Oral <User Schedule>  polyethylene glycol 3350 17 Gram(s) Oral daily  polyethylene glycol/electrolyte Solution 4000 milliLiter(s) Oral once  potassium chloride    Tablet ER 20 milliEquivalent(s) Oral two times a day  tiotropium 18 MICROgram(s) Capsule 1 Capsule(s) Inhalation daily    MEDICATIONS  (PRN):  acetaminophen   Tablet 650 milliGRAM(s) Oral every 6 hours PRN For Temp greater than 38 C (100.4 F)  acetaminophen   Tablet. 650 milliGRAM(s) Oral every 6 hours PRN Mild Pain (1 - 3)  ALBUTerol    0.083% 2.5 milliGRAM(s) Nebulizer every 6 hours PRN Shortness of Breath  dextrose Gel 1 Dose(s) Oral once PRN Blood Glucose LESS THAN 70 milliGRAM(s)/deciliter  glucagon  Injectable 1 milliGRAM(s) IntraMuscular once PRN Glucose LESS THAN 70 milligrams/deciliter  ondansetron Injectable 4 milliGRAM(s) IV Push every 6 hours PRN Nausea      Vital Signs Last 24 Hrs  T(C): 36.9 (24 Nov 2017 23:55), Max: 36.9 (24 Nov 2017 23:55)  T(F): 98.4 (24 Nov 2017 23:55), Max: 98.4 (24 Nov 2017 23:55)  HR: 63 (24 Nov 2017 23:55) (63 - 64)  BP: 114/56 (24 Nov 2017 23:55) (114/56 - 120/56)  BP(mean): --  RR: 18 (24 Nov 2017 23:55) (18 - 20)  SpO2: 97% (24 Nov 2017 23:55) (97% - 97%)    PHYSICAL EXAM:    Constitutional: NAD, resting comfortably, WNWD  Eyes: PERRLA, EOM intact   Head: NCAT  Neck: trachea midline, FROM  Respiratory: CTA b/l, no WRR  Cardiovascular: S1S2, RRR  Gastrointestinal: abd. soft, NT, protuberant  Extremities: no LE edema b/l  Neurological: AOx3, sensation grossly intact  Skin: warm, dry, intact       LABS:                        9.8    7.9   )-----------( 441      ( 24 Nov 2017 07:38 )             33.5     11-24    138  |  94<L>  |  47.0<H>  ----------------------------<  123<H>  3.9   |  31.0<H>  |  1.57<H>    Ca    9.3      24 Nov 2017 07:38  Phos  4.1     11-23  Mg     2.2     11-24

## 2017-11-25 NOTE — CONSULT NOTE ADULT - SUBJECTIVE AND OBJECTIVE BOX
Chief Complaint/Reason for Admission: colon mass	  History of Present Illness: 	  88 yo female, c/o progressively enlarging abd girth over 1 yr, s/p colonoscopy 10/19/17, found to have tubular adenoma at ileocecal valve and 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 Pt denies Nausea/vomiting, diarrhea. C/o constipation. Denies fever/chills.    Pt direct admission for operative intervention by Dr Flowers on 11/20.    Pt c/o SOB/dyspnea and mild vague chest discomfort upon admission to the floor, also c/o tender inner left lower leg, also c/o LE edema L > R and coldness of LE's    Pulmonary consult - Dr Bang,    Primary care consult - Dr Barbosa    Cardiology consult - Dr Bush      Home Medications:     · 	Creon 6000 units oral delayed release capsule: 1 cap(s) orally 3 times a day, Last Dose Taken:    · 	Lyrica 25 mg oral capsule: orally 2 times a day, Last Dose Taken:    · 	Spiriva 18 mcg inhalation capsule: 1 cap(s) inhaled once a day, Last Dose Taken:    · 	warfarin 5 mg oral tablet: 1 tab(s) orally once a day, Last Dose Taken:    · 	torsemide: 40 milligram(s) orally once a day, Last Dose Taken:    · 	Zaroxolyn 5 mg oral tablet: 1 tab(s) orally once a day, 1/2 hour before Turosemide  · 	methIMAzole: 2.5 milligram(s) orally once a day on Monday, Tuesday, Wednesday, Friday, and Sunday, Last Dose Taken:    · 	hydrALAZINE: 5 milligram(s) orally 2 times a day, Last Dose Taken:    · 	Tylenol 325 mg oral capsule: 1 cap(s) orally 3 times a day, As Needed  · 	albuterol: 0.083 dose(s) by nebulizer every 6 hours, As Needed, Last Dose Taken:    · 	ascorbic acid 500 mg oral capsule: 1 cap(s) orally once a day, Last Dose Taken:    · 	Calcium 500+D oral tablet, chewable: 1 tab(s) orally 2 times a day, Last Dose Taken:    · 	dilTIAZem 240 mg/24 hours oral capsule, extended release: 1 cap(s) orally once a day, Last Dose Taken:    · 	Lantus: 14 unit(s) subcutaneous once a day  · 	isosorbide mononitrate 120 mg oral tablet, extended release: 1 tab(s) orally once a day (in the morning)  · 	metoprolol tartrate 25 mg oral tablet: 1 tab(s) orally 2 times a day  · 	MiraLax oral powder for reconstitution: 17 gram(s) orally once a day  · 	mirtazapine 15 mg oral tablet: 1 tab(s) orally once a day (at bedtime)    PAST MEDICAL & SURGICAL HISTORY:  Renal insufficiency  Malignant neoplasm of descending colon  Microcytic anemia  ILD (interstitial lung disease)  Tricuspid valve insufficiency, unspecified etiology  Mitral valve insufficiency, unspecified etiology  Aortic valve insufficiency, etiology of cardiac valve disease unspecified  IDDM (insulin dependent diabetes mellitus)  COPD, mild  Atrial fibrillation, unspecified type  Diabetes  Cardiomegaly  Congestive heart failure, unspecified congestive heart failure chronicity, unspecified congestive heart failure type  H/O: hysterectomy  History of laparoscopic cholecystectomy  H/O splenectomy    acetaminophen   Tablet 650 milliGRAM(s) Oral every 6 hours PRN  acetaminophen   Tablet. 650 milliGRAM(s) Oral every 6 hours PRN  ALBUTerol    0.083% 2.5 milliGRAM(s) Nebulizer every 6 hours PRN  allopurinol 100 milliGRAM(s) Oral daily  alvimopan 12 milliGRAM(s) Oral once  amylase/lipase/protease  (CREON  6,000 Units) 1 Capsule(s) Oral three times a day with meals  ascorbic acid 500 milliGRAM(s) Oral daily  calcium carbonate 1250 mG + Vitamin D (OsCal 500 + D) 1 Tablet(s) Oral two times a day  cefTRIAXone   IVPB 1 Gram(s) IV Intermittent every 24 hours  cefTRIAXone   IVPB      dextrose 5%. 1000 milliLiter(s) IV Continuous <Continuous>  dextrose 50% Injectable 12.5 Gram(s) IV Push once  dextrose 50% Injectable 25 Gram(s) IV Push once  dextrose 50% Injectable 25 Gram(s) IV Push once  dextrose Gel 1 Dose(s) Oral once PRN  diltiazem    milliGRAM(s) Oral daily  glucagon  Injectable 1 milliGRAM(s) IntraMuscular once PRN  heparin  Injectable 5000 Unit(s) SubCutaneous every 12 hours  hydrALAZINE 50 milliGRAM(s) Oral two times a day  insulin glargine Injectable (LANTUS) 14 Unit(s) SubCutaneous every morning  insulin lispro (HumaLOG) corrective regimen sliding scale   SubCutaneous three times a day before meals  isosorbide   mononitrate ER Tablet (IMDUR) 120 milliGRAM(s) Oral daily  lidocaine   Patch 1 Patch Transdermal daily  methimazole 2.5 milliGRAM(s) Oral <User Schedule>  metoprolol     tartrate 25 milliGRAM(s) Oral two times a day  metroNIDAZOLE    Tablet 500 milliGRAM(s) Oral <User Schedule>  mirtazapine 15 milliGRAM(s) Oral daily  neomycin 1000 milliGRAM(s) Oral <User Schedule>  ondansetron Injectable 4 milliGRAM(s) IV Push every 6 hours PRN  polyethylene glycol 3350 17 Gram(s) Oral daily  potassium chloride    Tablet ER 20 milliEquivalent(s) Oral two times a day  sodium chloride 0.9%. 1000 milliLiter(s) IV Continuous <Continuous>  tiotropium 18 MICROgram(s) Capsule 1 Capsule(s) Inhalation daily    MEDICATIONS  (STANDING):  allopurinol 100 milliGRAM(s) Oral daily  alvimopan 12 milliGRAM(s) Oral once  amylase/lipase/protease  (CREON  6,000 Units) 1 Capsule(s) Oral three times a day with meals  ascorbic acid 500 milliGRAM(s) Oral daily  calcium carbonate 1250 mG + Vitamin D (OsCal 500 + D) 1 Tablet(s) Oral two times a day  cefTRIAXone   IVPB 1 Gram(s) IV Intermittent every 24 hours  cefTRIAXone   IVPB      dextrose 5%. 1000 milliLiter(s) (50 mL/Hr) IV Continuous <Continuous>  dextrose 50% Injectable 12.5 Gram(s) IV Push once  dextrose 50% Injectable 25 Gram(s) IV Push once  dextrose 50% Injectable 25 Gram(s) IV Push once  diltiazem    milliGRAM(s) Oral daily  heparin  Injectable 5000 Unit(s) SubCutaneous every 12 hours  hydrALAZINE 50 milliGRAM(s) Oral two times a day  insulin glargine Injectable (LANTUS) 14 Unit(s) SubCutaneous every morning  insulin lispro (HumaLOG) corrective regimen sliding scale   SubCutaneous three times a day before meals  isosorbide   mononitrate ER Tablet (IMDUR) 120 milliGRAM(s) Oral daily  lidocaine   Patch 1 Patch Transdermal daily  methimazole 2.5 milliGRAM(s) Oral <User Schedule>  metoprolol     tartrate 25 milliGRAM(s) Oral two times a day  metroNIDAZOLE    Tablet 500 milliGRAM(s) Oral <User Schedule>  mirtazapine 15 milliGRAM(s) Oral daily  neomycin 1000 milliGRAM(s) Oral <User Schedule>  polyethylene glycol 3350 17 Gram(s) Oral daily  potassium chloride    Tablet ER 20 milliEquivalent(s) Oral two times a day  sodium chloride 0.9%. 1000 milliLiter(s) (50 mL/Hr) IV Continuous <Continuous>  tiotropium 18 MICROgram(s) Capsule 1 Capsule(s) Inhalation daily    MEDICATIONS  (PRN):  acetaminophen   Tablet 650 milliGRAM(s) Oral every 6 hours PRN For Temp greater than 38 C (100.4 F)  acetaminophen   Tablet. 650 milliGRAM(s) Oral every 6 hours PRN Mild Pain (1 - 3)  ALBUTerol    0.083% 2.5 milliGRAM(s) Nebulizer every 6 hours PRN Shortness of Breath  dextrose Gel 1 Dose(s) Oral once PRN Blood Glucose LESS THAN 70 milliGRAM(s)/deciliter  glucagon  Injectable 1 milliGRAM(s) IntraMuscular once PRN Glucose LESS THAN 70 milligrams/deciliter  ondansetron Injectable 4 milliGRAM(s) IV Push every 6 hours PRN Nausea      Allergies    Cipro (Unknown)  contrast media (iodine-based) (Unknown)  penicillins (Unknown)  shellfish (Unknown)  Valium (Unknown)      SOCIAL HISTORY:  No S/D/IVDU    FAMILY HISTORY:  No pertinent family history in first degree relatives      LABS:                        9.6    7.9   )-----------( 439      ( 25 Nov 2017 07:54 )             32.3     11-25    139  |  95<L>  |  44.0<H>  ----------------------------<  82  3.8   |  33.0<H>  |  1.56<H>    Ca    9.2      25 Nov 2017 07:54  Phos  3.8     11-25  Mg     2.0     11-25              ROS  - Headache  - Neck Stiffness  - Chest Pain  - SOB  + Abd bloating  - Pelvic Pain  - Leg Pain  - Head Ache    Vital Signs Last 24 Hrs  T(C): 36.7 (25 Nov 2017 16:44), Max: 36.9 (24 Nov 2017 23:55)  T(F): 98.1 (25 Nov 2017 16:44), Max: 98.4 (24 Nov 2017 23:55)  HR: 79 (25 Nov 2017 16:44) (63 - 79)  BP: 132/69 (25 Nov 2017 16:44) (114/56 - 132/69)  BP(mean): --  RR: 18 (25 Nov 2017 16:44) (18 - 18)  SpO2: 97% (24 Nov 2017 23:55) (97% - 97%)    HEENT: PEARLA  Neck: Supple  Cardio: S1 S2  TRISH Irregular  Pulm: CTA Upper Lower lobes Low course crackles  Abd: Soft NT +Distension BS+  Rectal - refused  Ext: No DCT  Skin: No Rash  Neuro: Awake [place]      Malignant neoplasm of descending colon - Medically cleared for surgery patient accepts high risks  Renal insufficiency - Nephology following diuretic stopped increase for Pulm Edema Post op  Microcytic anemia - secondary to age and colonn cancer  ILD (interstitial lung disease) - Pulm  Multivalvular Dz - Cardiology  IDDM (insulin dependent diabetes mellitus) - will lower Lantus tonight and SS  COPD, mild - cont meds Pulm  Atrial fibrillation - Rate controlled  Congestive heart failure - high risk of post op failure, careful I&O monitoring  Sepsis - on Antibiotics

## 2017-11-25 NOTE — PROGRESS NOTE ADULT - ASSESSMENT
89F c sigmoid colon cancer, code sepsis, fever and leukocytosis on this hospital admission  Neuro: Pain control as prescribed   Cardio: Monitor Vital signs, cont meds  Pulm: Incentive spirometry and deep breathing  GI: Diet: CLD today, NPO after MN tonight; Monitor bowel function; Bowel regimen ordered  : monitor urine output and trend creatinine  MS: Encourage OOB and ambulation, PT consult pending  ID: cont ceftriaxone, flagyl and neomycin for bowel prep  Endo: cont ISS in conjunction with lantus, monitor FS  DVT ppx: cont SQH  Plan for OR Sunday 11/26  monitor for fevers   awaiting AM labs

## 2017-11-26 ENCOUNTER — RESULT REVIEW (OUTPATIENT)
Age: 82
End: 2017-11-26

## 2017-11-26 ENCOUNTER — TRANSCRIPTION ENCOUNTER (OUTPATIENT)
Age: 82
End: 2017-11-26

## 2017-11-26 LAB
ACANTHOCYTES BLD QL SMEAR: SLIGHT — SIGNIFICANT CHANGE UP
ACETONE SERPL-MCNC: NEGATIVE — SIGNIFICANT CHANGE UP
ALBUMIN SERPL ELPH-MCNC: 2.9 G/DL — LOW (ref 3.3–5.2)
ALP SERPL-CCNC: 93 U/L — SIGNIFICANT CHANGE UP (ref 40–120)
ALT FLD-CCNC: 20 U/L — SIGNIFICANT CHANGE UP
ANION GAP SERPL CALC-SCNC: 12 MMOL/L — SIGNIFICANT CHANGE UP (ref 5–17)
ANION GAP SERPL CALC-SCNC: 12 MMOL/L — SIGNIFICANT CHANGE UP (ref 5–17)
ANION GAP SERPL CALC-SCNC: 21 MMOL/L — HIGH (ref 5–17)
ANISOCYTOSIS BLD QL: SLIGHT — SIGNIFICANT CHANGE UP
ANISOCYTOSIS BLD QL: SLIGHT — SIGNIFICANT CHANGE UP
APPEARANCE UR: CLEAR — SIGNIFICANT CHANGE UP
AST SERPL-CCNC: 28 U/L — SIGNIFICANT CHANGE UP
BACTERIA # UR AUTO: ABNORMAL
BASE EXCESS BLDA CALC-SCNC: -2.8 MMOL/L — SIGNIFICANT CHANGE UP (ref -3–3)
BASOPHILS # BLD AUTO: 0 K/UL — SIGNIFICANT CHANGE UP (ref 0–0.2)
BASOPHILS # BLD AUTO: 0 K/UL — SIGNIFICANT CHANGE UP (ref 0–0.2)
BASOPHILS NFR BLD AUTO: 0.2 % — SIGNIFICANT CHANGE UP (ref 0–2)
BILIRUB SERPL-MCNC: 0.1 MG/DL — LOW (ref 0.4–2)
BILIRUB UR-MCNC: NEGATIVE — SIGNIFICANT CHANGE UP
BLOOD GAS COMMENTS ARTERIAL: SIGNIFICANT CHANGE UP
BUN SERPL-MCNC: 21 MG/DL — HIGH (ref 8–20)
BUN SERPL-MCNC: 25 MG/DL — HIGH (ref 8–20)
BUN SERPL-MCNC: 25 MG/DL — HIGH (ref 8–20)
BURR CELLS BLD QL SMEAR: PRESENT — SIGNIFICANT CHANGE UP
CALCIUM SERPL-MCNC: 8.5 MG/DL — LOW (ref 8.6–10.2)
CALCIUM SERPL-MCNC: 8.6 MG/DL — SIGNIFICANT CHANGE UP (ref 8.6–10.2)
CALCIUM SERPL-MCNC: 9.1 MG/DL — SIGNIFICANT CHANGE UP (ref 8.6–10.2)
CHLORIDE SERPL-SCNC: 100 MMOL/L — SIGNIFICANT CHANGE UP (ref 98–107)
CHLORIDE SERPL-SCNC: 101 MMOL/L — SIGNIFICANT CHANGE UP (ref 98–107)
CHLORIDE SERPL-SCNC: 98 MMOL/L — SIGNIFICANT CHANGE UP (ref 98–107)
CO2 SERPL-SCNC: 23 MMOL/L — SIGNIFICANT CHANGE UP (ref 22–29)
CO2 SERPL-SCNC: 27 MMOL/L — SIGNIFICANT CHANGE UP (ref 22–29)
CO2 SERPL-SCNC: 31 MMOL/L — HIGH (ref 22–29)
COLOR SPEC: YELLOW — SIGNIFICANT CHANGE UP
CREAT SERPL-MCNC: 0.98 MG/DL — SIGNIFICANT CHANGE UP (ref 0.5–1.3)
CREAT SERPL-MCNC: 1.22 MG/DL — SIGNIFICANT CHANGE UP (ref 0.5–1.3)
CREAT SERPL-MCNC: 1.59 MG/DL — HIGH (ref 0.5–1.3)
DIFF PNL FLD: ABNORMAL
EOSINOPHIL # BLD AUTO: 0 K/UL — SIGNIFICANT CHANGE UP (ref 0–0.5)
EOSINOPHIL # BLD AUTO: 0.2 K/UL — SIGNIFICANT CHANGE UP (ref 0–0.5)
EOSINOPHIL NFR BLD AUTO: 0 % — SIGNIFICANT CHANGE UP (ref 0–6)
EOSINOPHIL NFR BLD AUTO: 2.7 % — SIGNIFICANT CHANGE UP (ref 0–6)
EPI CELLS # UR: SIGNIFICANT CHANGE UP
GAS PNL BLDA: SIGNIFICANT CHANGE UP
GLUCOSE BLDC GLUCOMTR-MCNC: 103 MG/DL — HIGH (ref 70–99)
GLUCOSE BLDC GLUCOMTR-MCNC: 248 MG/DL — HIGH (ref 70–99)
GLUCOSE BLDC GLUCOMTR-MCNC: 261 MG/DL — HIGH (ref 70–99)
GLUCOSE SERPL-MCNC: 256 MG/DL — HIGH (ref 70–115)
GLUCOSE SERPL-MCNC: 292 MG/DL — HIGH (ref 70–115)
GLUCOSE SERPL-MCNC: 91 MG/DL — SIGNIFICANT CHANGE UP (ref 70–115)
GLUCOSE UR QL: NEGATIVE MG/DL — SIGNIFICANT CHANGE UP
HCO3 BLDA-SCNC: 22 MMOL/L — SIGNIFICANT CHANGE UP (ref 20–26)
HCT VFR BLD CALC: 33.1 % — LOW (ref 37–47)
HCT VFR BLD CALC: 35.3 % — LOW (ref 37–47)
HGB BLD-MCNC: 10.2 G/DL — LOW (ref 12–16)
HGB BLD-MCNC: 9.9 G/DL — LOW (ref 12–16)
HOROWITZ INDEX BLDA+IHG-RTO: SIGNIFICANT CHANGE UP
HYALINE CASTS # UR AUTO: ABNORMAL /LPF
HYPOCHROMIA BLD QL: SLIGHT — SIGNIFICANT CHANGE UP
HYPOCHROMIA BLD QL: SLIGHT — SIGNIFICANT CHANGE UP
INR BLD: 0.97 RATIO — SIGNIFICANT CHANGE UP (ref 0.88–1.16)
KETONES UR-MCNC: NEGATIVE — SIGNIFICANT CHANGE UP
LACTATE SERPL-SCNC: 4.4 MMOL/L — CRITICAL HIGH (ref 0.5–2)
LEUKOCYTE ESTERASE UR-ACNC: ABNORMAL
LYMPHOCYTES # BLD AUTO: 0.3 K/UL — LOW (ref 1–4.8)
LYMPHOCYTES # BLD AUTO: 2 % — LOW (ref 20–55)
LYMPHOCYTES # BLD AUTO: 3.5 K/UL — SIGNIFICANT CHANGE UP (ref 1–4.8)
LYMPHOCYTES # BLD AUTO: 39.4 % — SIGNIFICANT CHANGE UP (ref 20–55)
MACROCYTES BLD QL: SLIGHT — SIGNIFICANT CHANGE UP
MAGNESIUM SERPL-MCNC: 1.5 MG/DL — LOW (ref 1.8–2.6)
MAGNESIUM SERPL-MCNC: 1.8 MG/DL — SIGNIFICANT CHANGE UP (ref 1.6–2.6)
MCHC RBC-ENTMCNC: 21.6 PG — LOW (ref 27–31)
MCHC RBC-ENTMCNC: 21.6 PG — LOW (ref 27–31)
MCHC RBC-ENTMCNC: 28.9 G/DL — LOW (ref 32–36)
MCHC RBC-ENTMCNC: 29.9 G/DL — LOW (ref 32–36)
MCV RBC AUTO: 72.3 FL — LOW (ref 81–99)
MCV RBC AUTO: 74.8 FL — LOW (ref 81–99)
MICROCYTES BLD QL: SLIGHT — SIGNIFICANT CHANGE UP
MICROCYTES BLD QL: SLIGHT — SIGNIFICANT CHANGE UP
MONOCYTES # BLD AUTO: 0.7 K/UL — SIGNIFICANT CHANGE UP (ref 0–0.8)
MONOCYTES # BLD AUTO: 1.4 K/UL — HIGH (ref 0–0.8)
MONOCYTES NFR BLD AUTO: 15.7 % — HIGH (ref 3–10)
MONOCYTES NFR BLD AUTO: 5 % — SIGNIFICANT CHANGE UP (ref 3–10)
NEUTROPHILS # BLD AUTO: 18.8 K/UL — HIGH (ref 1.8–8)
NEUTROPHILS # BLD AUTO: 3.7 K/UL — SIGNIFICANT CHANGE UP (ref 1.8–8)
NEUTROPHILS NFR BLD AUTO: 41.8 % — SIGNIFICANT CHANGE UP (ref 37–73)
NEUTROPHILS NFR BLD AUTO: 90 % — HIGH (ref 37–73)
NEUTS BAND # BLD: 3 % — SIGNIFICANT CHANGE UP (ref 0–8)
NITRITE UR-MCNC: NEGATIVE — SIGNIFICANT CHANGE UP
NT-PROBNP SERPL-SCNC: 1597 PG/ML — HIGH (ref 0–300)
NT-PROBNP SERPL-SCNC: 2305 PG/ML — HIGH (ref 0–300)
OVALOCYTES BLD QL SMEAR: SLIGHT — SIGNIFICANT CHANGE UP
OVALOCYTES BLD QL SMEAR: SLIGHT — SIGNIFICANT CHANGE UP
PCO2 BLDA: 49 MMHG — HIGH (ref 35–45)
PH BLDA: 7.3 — LOW (ref 7.35–7.45)
PH UR: 5 — SIGNIFICANT CHANGE UP (ref 5–8)
PHOSPHATE SERPL-MCNC: 2.2 MG/DL — LOW (ref 2.4–4.7)
PHOSPHATE SERPL-MCNC: 3.8 MG/DL — SIGNIFICANT CHANGE UP (ref 2.4–4.7)
PLAT MORPH BLD: NORMAL — SIGNIFICANT CHANGE UP
PLAT MORPH BLD: NORMAL — SIGNIFICANT CHANGE UP
PLATELET # BLD AUTO: 450 K/UL — HIGH (ref 150–400)
PLATELET # BLD AUTO: 478 K/UL — HIGH (ref 150–400)
PO2 BLDA: 144 MMHG — HIGH (ref 83–108)
POIKILOCYTOSIS BLD QL AUTO: SLIGHT — SIGNIFICANT CHANGE UP
POIKILOCYTOSIS BLD QL AUTO: SLIGHT — SIGNIFICANT CHANGE UP
POLYCHROMASIA BLD QL SMEAR: SLIGHT — SIGNIFICANT CHANGE UP
POTASSIUM SERPL-MCNC: 3.8 MMOL/L — SIGNIFICANT CHANGE UP (ref 3.5–5.3)
POTASSIUM SERPL-MCNC: 4 MMOL/L — SIGNIFICANT CHANGE UP (ref 3.5–5.3)
POTASSIUM SERPL-MCNC: 4.3 MMOL/L — SIGNIFICANT CHANGE UP (ref 3.5–5.3)
POTASSIUM SERPL-SCNC: 3.8 MMOL/L — SIGNIFICANT CHANGE UP (ref 3.5–5.3)
POTASSIUM SERPL-SCNC: 4 MMOL/L — SIGNIFICANT CHANGE UP (ref 3.5–5.3)
POTASSIUM SERPL-SCNC: 4.3 MMOL/L — SIGNIFICANT CHANGE UP (ref 3.5–5.3)
PROT SERPL-MCNC: 6.4 G/DL — LOW (ref 6.6–8.7)
PROT UR-MCNC: 15 MG/DL
PROTHROM AB SERPL-ACNC: 10.7 SEC — SIGNIFICANT CHANGE UP (ref 9.8–12.7)
RBC # BLD: 4.58 M/UL — SIGNIFICANT CHANGE UP (ref 4.4–5.2)
RBC # BLD: 4.72 M/UL — SIGNIFICANT CHANGE UP (ref 4.4–5.2)
RBC # FLD: 19.8 % — HIGH (ref 11–15.6)
RBC # FLD: 20.3 % — HIGH (ref 11–15.6)
RBC BLD AUTO: ABNORMAL
RBC BLD AUTO: ABNORMAL
RBC CASTS # UR COMP ASSIST: SIGNIFICANT CHANGE UP /HPF (ref 0–4)
SAO2 % BLDA: 99 % — SIGNIFICANT CHANGE UP (ref 95–99)
SCHISTOCYTES BLD QL AUTO: SLIGHT — SIGNIFICANT CHANGE UP
SODIUM SERPL-SCNC: 139 MMOL/L — SIGNIFICANT CHANGE UP (ref 135–145)
SODIUM SERPL-SCNC: 142 MMOL/L — SIGNIFICANT CHANGE UP (ref 135–145)
SODIUM SERPL-SCNC: 144 MMOL/L — SIGNIFICANT CHANGE UP (ref 135–145)
SP GR SPEC: 1.01 — SIGNIFICANT CHANGE UP (ref 1.01–1.02)
SPHEROCYTES BLD QL SMEAR: SLIGHT — SIGNIFICANT CHANGE UP
TARGETS BLD QL SMEAR: SLIGHT — SIGNIFICANT CHANGE UP
TARGETS BLD QL SMEAR: SLIGHT — SIGNIFICANT CHANGE UP
TROPONIN T SERPL-MCNC: 0.03 NG/ML — SIGNIFICANT CHANGE UP (ref 0–0.06)
UROBILINOGEN FLD QL: NEGATIVE MG/DL — SIGNIFICANT CHANGE UP
WBC # BLD: 19.9 K/UL — HIGH (ref 4.8–10.8)
WBC # BLD: 8.9 K/UL — SIGNIFICANT CHANGE UP (ref 4.8–10.8)
WBC # FLD AUTO: 19.9 K/UL — HIGH (ref 4.8–10.8)
WBC # FLD AUTO: 8.9 K/UL — SIGNIFICANT CHANGE UP (ref 4.8–10.8)
WBC UR QL: SIGNIFICANT CHANGE UP

## 2017-11-26 PROCEDURE — 88341 IMHCHEM/IMCYTCHM EA ADD ANTB: CPT | Mod: 26

## 2017-11-26 PROCEDURE — 71010: CPT | Mod: 26,77

## 2017-11-26 PROCEDURE — 93010 ELECTROCARDIOGRAM REPORT: CPT

## 2017-11-26 PROCEDURE — 88309 TISSUE EXAM BY PATHOLOGIST: CPT | Mod: 26

## 2017-11-26 PROCEDURE — 88342 IMHCHEM/IMCYTCHM 1ST ANTB: CPT | Mod: 26

## 2017-11-26 PROCEDURE — 70450 CT HEAD/BRAIN W/O DYE: CPT | Mod: 26

## 2017-11-26 PROCEDURE — 88305 TISSUE EXAM BY PATHOLOGIST: CPT | Mod: 26

## 2017-11-26 PROCEDURE — 71010: CPT | Mod: 26

## 2017-11-26 RX ORDER — DEXTROSE 50 % IN WATER 50 %
25 SYRINGE (ML) INTRAVENOUS ONCE
Qty: 0 | Refills: 0 | Status: DISCONTINUED | OUTPATIENT
Start: 2017-11-26 | End: 2017-12-06

## 2017-11-26 RX ORDER — CEFOTETAN DISODIUM 1 G
2 VIAL (EA) INJECTION ONCE
Qty: 0 | Refills: 0 | Status: COMPLETED | OUTPATIENT
Start: 2017-11-27 | End: 2017-11-27

## 2017-11-26 RX ORDER — SODIUM CHLORIDE 9 MG/ML
1000 INJECTION, SOLUTION INTRAVENOUS
Qty: 0 | Refills: 0 | Status: DISCONTINUED | OUTPATIENT
Start: 2017-11-26 | End: 2017-12-06

## 2017-11-26 RX ORDER — HEPARIN SODIUM 5000 [USP'U]/ML
5000 INJECTION INTRAVENOUS; SUBCUTANEOUS EVERY 8 HOURS
Qty: 0 | Refills: 0 | Status: DISCONTINUED | OUTPATIENT
Start: 2017-11-26 | End: 2017-11-30

## 2017-11-26 RX ORDER — ALVIMOPAN 12 MG/1
12 CAPSULE ORAL
Qty: 0 | Refills: 0 | Status: COMPLETED | OUTPATIENT
Start: 2017-11-27 | End: 2017-12-03

## 2017-11-26 RX ORDER — CEFOTETAN DISODIUM 1 G
2 VIAL (EA) INJECTION ONCE
Qty: 0 | Refills: 0 | Status: COMPLETED | OUTPATIENT
Start: 2017-11-26 | End: 2017-11-26

## 2017-11-26 RX ORDER — ACETAMINOPHEN 500 MG
1000 TABLET ORAL ONCE
Qty: 0 | Refills: 0 | Status: COMPLETED | OUTPATIENT
Start: 2017-11-26 | End: 2017-11-26

## 2017-11-26 RX ORDER — FENTANYL CITRATE 50 UG/ML
25 INJECTION INTRAVENOUS
Qty: 0 | Refills: 0 | Status: DISCONTINUED | OUTPATIENT
Start: 2017-11-26 | End: 2017-11-26

## 2017-11-26 RX ORDER — ONDANSETRON 8 MG/1
4 TABLET, FILM COATED ORAL EVERY 6 HOURS
Qty: 0 | Refills: 0 | Status: DISCONTINUED | OUTPATIENT
Start: 2017-11-26 | End: 2017-12-06

## 2017-11-26 RX ORDER — INSULIN LISPRO 100/ML
VIAL (ML) SUBCUTANEOUS
Qty: 0 | Refills: 0 | Status: DISCONTINUED | OUTPATIENT
Start: 2017-11-26 | End: 2017-11-27

## 2017-11-26 RX ORDER — DEXTROSE 50 % IN WATER 50 %
12.5 SYRINGE (ML) INTRAVENOUS ONCE
Qty: 0 | Refills: 0 | Status: DISCONTINUED | OUTPATIENT
Start: 2017-11-26 | End: 2017-12-06

## 2017-11-26 RX ORDER — ONDANSETRON 8 MG/1
4 TABLET, FILM COATED ORAL ONCE
Qty: 0 | Refills: 0 | Status: DISCONTINUED | OUTPATIENT
Start: 2017-11-26 | End: 2017-11-26

## 2017-11-26 RX ORDER — DEXMEDETOMIDINE HYDROCHLORIDE IN 0.9% SODIUM CHLORIDE 4 UG/ML
0.3 INJECTION INTRAVENOUS
Qty: 200 | Refills: 0 | Status: DISCONTINUED | OUTPATIENT
Start: 2017-11-26 | End: 2017-11-27

## 2017-11-26 RX ORDER — DEXTROSE 50 % IN WATER 50 %
1 SYRINGE (ML) INTRAVENOUS ONCE
Qty: 0 | Refills: 0 | Status: DISCONTINUED | OUTPATIENT
Start: 2017-11-26 | End: 2017-12-06

## 2017-11-26 RX ORDER — ACETAMINOPHEN 500 MG
1000 TABLET ORAL ONCE
Qty: 0 | Refills: 0 | Status: DISCONTINUED | OUTPATIENT
Start: 2017-11-27 | End: 2017-11-27

## 2017-11-26 RX ORDER — DEXMEDETOMIDINE HYDROCHLORIDE IN 0.9% SODIUM CHLORIDE 4 UG/ML
0.2 INJECTION INTRAVENOUS
Qty: 200 | Refills: 0 | Status: DISCONTINUED | OUTPATIENT
Start: 2017-11-26 | End: 2017-11-26

## 2017-11-26 RX ORDER — FUROSEMIDE 40 MG
20 TABLET ORAL ONCE
Qty: 0 | Refills: 0 | Status: COMPLETED | OUTPATIENT
Start: 2017-11-26 | End: 2017-11-26

## 2017-11-26 RX ORDER — MAGNESIUM SULFATE 500 MG/ML
2 VIAL (ML) INJECTION
Qty: 0 | Refills: 0 | Status: COMPLETED | OUTPATIENT
Start: 2017-11-26 | End: 2017-11-26

## 2017-11-26 RX ORDER — METOPROLOL TARTRATE 50 MG
5 TABLET ORAL EVERY 6 HOURS
Qty: 0 | Refills: 0 | Status: DISCONTINUED | OUTPATIENT
Start: 2017-11-26 | End: 2017-11-28

## 2017-11-26 RX ORDER — POTASSIUM PHOSPHATE, MONOBASIC POTASSIUM PHOSPHATE, DIBASIC 236; 224 MG/ML; MG/ML
15 INJECTION, SOLUTION INTRAVENOUS ONCE
Qty: 0 | Refills: 0 | Status: DISCONTINUED | OUTPATIENT
Start: 2017-11-26 | End: 2017-11-26

## 2017-11-26 RX ORDER — FENTANYL CITRATE 50 UG/ML
25 INJECTION INTRAVENOUS ONCE
Qty: 0 | Refills: 0 | Status: DISCONTINUED | OUTPATIENT
Start: 2017-11-26 | End: 2017-11-26

## 2017-11-26 RX ORDER — GLUCAGON INJECTION, SOLUTION 0.5 MG/.1ML
1 INJECTION, SOLUTION SUBCUTANEOUS ONCE
Qty: 0 | Refills: 0 | Status: DISCONTINUED | OUTPATIENT
Start: 2017-11-26 | End: 2017-12-06

## 2017-11-26 RX ORDER — HYDRALAZINE HCL 50 MG
50 TABLET ORAL
Qty: 0 | Refills: 0 | Status: DISCONTINUED | OUTPATIENT
Start: 2017-11-26 | End: 2017-11-30

## 2017-11-26 RX ORDER — SODIUM CHLORIDE 9 MG/ML
1000 INJECTION, SOLUTION INTRAVENOUS
Qty: 0 | Refills: 0 | Status: DISCONTINUED | OUTPATIENT
Start: 2017-11-26 | End: 2017-11-28

## 2017-11-26 RX ORDER — ACETAMINOPHEN 500 MG
1000 TABLET ORAL ONCE
Qty: 0 | Refills: 0 | Status: COMPLETED | OUTPATIENT
Start: 2017-11-27 | End: 2017-11-26

## 2017-11-26 RX ORDER — SODIUM CHLORIDE 9 MG/ML
1000 INJECTION, SOLUTION INTRAVENOUS
Qty: 0 | Refills: 0 | Status: DISCONTINUED | OUTPATIENT
Start: 2017-11-26 | End: 2017-11-26

## 2017-11-26 RX ORDER — MORPHINE SULFATE 50 MG/1
2 CAPSULE, EXTENDED RELEASE ORAL EVERY 4 HOURS
Qty: 0 | Refills: 0 | Status: DISCONTINUED | OUTPATIENT
Start: 2017-11-26 | End: 2017-11-26

## 2017-11-26 RX ADMIN — FENTANYL CITRATE 25 MICROGRAM(S): 50 INJECTION INTRAVENOUS at 17:00

## 2017-11-26 RX ADMIN — Medication 20 MILLIGRAM(S): at 18:36

## 2017-11-26 RX ADMIN — FENTANYL CITRATE 25 MICROGRAM(S): 50 INJECTION INTRAVENOUS at 15:23

## 2017-11-26 RX ADMIN — MORPHINE SULFATE 2 MILLIGRAM(S): 50 CAPSULE, EXTENDED RELEASE ORAL at 19:43

## 2017-11-26 RX ADMIN — Medication 20 MILLIEQUIVALENT(S): at 06:17

## 2017-11-26 RX ADMIN — FENTANYL CITRATE 25 MICROGRAM(S): 50 INJECTION INTRAVENOUS at 22:58

## 2017-11-26 RX ADMIN — Medication 25 MILLIGRAM(S): at 06:17

## 2017-11-26 RX ADMIN — Medication 400 MILLIGRAM(S): at 17:29

## 2017-11-26 RX ADMIN — Medication 50 MILLIGRAM(S): at 06:17

## 2017-11-26 RX ADMIN — MORPHINE SULFATE 2 MILLIGRAM(S): 50 CAPSULE, EXTENDED RELEASE ORAL at 20:22

## 2017-11-26 RX ADMIN — Medication: at 12:23

## 2017-11-26 RX ADMIN — Medication 50 GRAM(S): at 22:45

## 2017-11-26 RX ADMIN — DEXMEDETOMIDINE HYDROCHLORIDE IN 0.9% SODIUM CHLORIDE 4.5 MICROGRAM(S)/KG/HR: 4 INJECTION INTRAVENOUS at 21:40

## 2017-11-26 RX ADMIN — FENTANYL CITRATE 25 MICROGRAM(S): 50 INJECTION INTRAVENOUS at 17:15

## 2017-11-26 RX ADMIN — FENTANYL CITRATE 25 MICROGRAM(S): 50 INJECTION INTRAVENOUS at 22:38

## 2017-11-26 RX ADMIN — FENTANYL CITRATE 25 MICROGRAM(S): 50 INJECTION INTRAVENOUS at 16:24

## 2017-11-26 RX ADMIN — Medication 1 TABLET(S): at 06:17

## 2017-11-26 RX ADMIN — Medication 100 GRAM(S): at 18:03

## 2017-11-26 RX ADMIN — LIDOCAINE 1 PATCH: 4 CREAM TOPICAL at 06:27

## 2017-11-26 RX ADMIN — FENTANYL CITRATE 25 MICROGRAM(S): 50 INJECTION INTRAVENOUS at 15:33

## 2017-11-26 RX ADMIN — SODIUM CHLORIDE 25 MILLILITER(S): 9 INJECTION, SOLUTION INTRAVENOUS at 21:41

## 2017-11-26 RX ADMIN — Medication 5 MILLIGRAM(S): at 23:49

## 2017-11-26 RX ADMIN — HEPARIN SODIUM 5000 UNIT(S): 5000 INJECTION INTRAVENOUS; SUBCUTANEOUS at 23:49

## 2017-11-26 RX ADMIN — FENTANYL CITRATE 25 MICROGRAM(S): 50 INJECTION INTRAVENOUS at 17:45

## 2017-11-26 RX ADMIN — Medication: at 17:53

## 2017-11-26 RX ADMIN — FENTANYL CITRATE 25 MICROGRAM(S): 50 INJECTION INTRAVENOUS at 17:30

## 2017-11-26 RX ADMIN — FENTANYL CITRATE 25 MICROGRAM(S): 50 INJECTION INTRAVENOUS at 16:34

## 2017-11-26 RX ADMIN — Medication 50 GRAM(S): at 23:47

## 2017-11-26 RX ADMIN — Medication 240 MILLIGRAM(S): at 06:17

## 2017-11-26 RX ADMIN — Medication 5 MILLIGRAM(S): at 19:22

## 2017-11-26 NOTE — BRIEF OPERATIVE NOTE - OPERATION/FINDINGS
Sigmoid colon mass - previously Tattooed during colonoscopy (tattooing present proximal and distal to palpable sigmoid mass).  All Donuts retrieved at endorectal stapler firing  Side to end anastomosis performed without tension  Proctosigmoidoscopy - air bubble test negative for leak Sigmoid colon mass - previously Tattooed during colonoscopy (tattooing present proximal and distal to palpable sigmoid mass).  All Donuts retrieved at endorectal stapler firing  End to Side anastomosis performed without tension  Proctosigmoidoscopy - air bubble test negative for leak

## 2017-11-26 NOTE — BRIEF OPERATIVE NOTE - POST-OP DX
Colon obstruction  11/26/2017    Active  Aneta Mike  Intra-abdominal adhesions  11/26/2017    Aneta Jeffries  Malignant neoplasm of sigmoid colon  11/26/2017    Active  Aneta Mike

## 2017-11-26 NOTE — PROGRESS NOTE ADULT - ASSESSMENT
89F s/p Colon resection  11/26/2017  Laparoscopic assisted sigmoid resection with End to side anastomosis , mobilization of splenic flexure,  Lysis of Adhesions, Rigid proctosigmoidoscopy  POD )  -admit to SICU for monitoring of vent   Neuro:  Pain control as prescribed   Cardio: Monitor Vital signs;   Pulm: intubated, wean to extubation when CHF resolves  GI: Diet: NPO; Monitor bowel function; Bowel regimen   : strict I&Os, thornton in place  ID: d/c after 24 hr post op  DVT ppx: Subq Hep  IVF: LR @25  Endo: Continue ISS, Monitor blood glucose

## 2017-11-26 NOTE — BRIEF OPERATIVE NOTE - PRE-OP DX
Colon obstruction  11/26/2017    Active  Aneta Mike  Malignant neoplasm of sigmoid colon  11/26/2017  Sigmoid colon mass  Active  Aneta Mike

## 2017-11-26 NOTE — PROGRESS NOTE ADULT - SUBJECTIVE AND OBJECTIVE BOX
INTERVAL HPI/OVERNIGHT EVENTS:  Pt seen and examined in the PACU.  Pt had to be reintubed in the PACU for altered mental status.  Pt was hemodynamically normal during and immediately after the case and holding a sat of 97 on ventimask but was still lethargic from anesthesia.  Pt more awake now not complaining of any pain.  Currently on volume AC.      STATUS POST:  Colon resection  11/26/2017  Laparoscopic assisted sigmoid resection with End to side anastomosis , mobilization of splenic flexure,  Lysis of Adhesions, Rigid proctosigmoidoscopy      POST OPERATIVE DAY #: 0    SUBJECTIVE:  Flatus: [ ] YES [x ] NO             Bowel Movement: [ ] YES [x ] NO  Pain (0-10):            Pain Control Adequate: x[ ] YES [ ] NO  Nausea: [ ] YES [x ] NO            Vomiting: [ ] YES [x ] NO  Diarrhea: [ ] YES [x ] NO         Constipation: [ ] YES [x ] NO     Chest Pain: [ ] YES [x ] NO    SOB:  [ ] YES [x ] NO    MEDICATIONS  (STANDING):  allopurinol 100 milliGRAM(s) Oral daily  alvimopan 12 milliGRAM(s) Oral once  amylase/lipase/protease  (CREON  6,000 Units) 1 Capsule(s) Oral three times a day with meals  ascorbic acid 500 milliGRAM(s) Oral daily  calcium carbonate 1250 mG + Vitamin D (OsCal 500 + D) 1 Tablet(s) Oral two times a day  dextrose 5%. 1000 milliLiter(s) (50 mL/Hr) IV Continuous <Continuous>  dextrose 50% Injectable 12.5 Gram(s) IV Push once  dextrose 50% Injectable 25 Gram(s) IV Push once  dextrose 50% Injectable 25 Gram(s) IV Push once  diltiazem    milliGRAM(s) Oral daily  heparin  Injectable 5000 Unit(s) SubCutaneous every 8 hours  hydrALAZINE 50 milliGRAM(s) Oral two times a day  insulin lispro (HumaLOG) corrective regimen sliding scale   SubCutaneous three times a day before meals  isosorbide   mononitrate ER Tablet (IMDUR) 120 milliGRAM(s) Oral daily  lactated ringers. 1000 milliLiter(s) (25 mL/Hr) IV Continuous <Continuous>  lidocaine   Patch 1 Patch Transdermal daily  methimazole 2.5 milliGRAM(s) Oral <User Schedule>  metoprolol    tartrate Injectable 5 milliGRAM(s) IV Push every 6 hours  mirtazapine 15 milliGRAM(s) Oral daily  polyethylene glycol 3350 17 Gram(s) Oral daily  potassium chloride    Tablet ER 20 milliEquivalent(s) Oral two times a day  tiotropium 18 MICROgram(s) Capsule 1 Capsule(s) Inhalation daily    MEDICATIONS  (PRN):  ALBUTerol    0.083% 2.5 milliGRAM(s) Nebulizer every 6 hours PRN Shortness of Breath  dextrose Gel 1 Dose(s) Oral once PRN Blood Glucose LESS THAN 70 milliGRAM(s)/deciliter  fentaNYL    Injectable 25 MICROGram(s) IV Push every 5 minutes PRN Moderate Pain  glucagon  Injectable 1 milliGRAM(s) IntraMuscular once PRN Glucose LESS THAN 70 milligrams/deciliter  morphine  - Injectable 2 milliGRAM(s) IV Push every 4 hours PRN Severe Pain  ondansetron Injectable 4 milliGRAM(s) IV Push every 6 hours PRN Nausea  ondansetron Injectable 4 milliGRAM(s) IV Push once PRN Nausea and/or Vomiting      Vital Signs Last 24 Hrs  T(C): 36 (26 Nov 2017 11:45), Max: 36.9 (26 Nov 2017 07:58)  T(F): 96.8 (26 Nov 2017 11:45), Max: 98.4 (26 Nov 2017 07:58)  HR: 72 (26 Nov 2017 17:00) (72 - 98)  BP: 138/59 (26 Nov 2017 17:00) (115/72 - 183/66)  BP(mean): --  RR: 15 (26 Nov 2017 17:00) (11 - 18)  SpO2: 100% (26 Nov 2017 17:00) (96% - 100%)    PHYSICAL EXAM:    Constitutional: NAD, resting comfortably, WNWD, intubated  Eyes: PERRLA, EOM intact   Head: NCAT  Neck: trachea midline, FROM  Respiratory: wheezes b/l  Cardiovascular: S1S2, RRR  Gastrointestinal: abd. distended, provena in place, moderately tender  Genitourinary: +thornton  Extremities: no LE edema b/l  Neurological: follows commands, lethargic, sensation grossly intact  Skin: warm, dry, intact   I&O's Detail    25 Nov 2017 07:01  -  26 Nov 2017 07:00  --------------------------------------------------------  IN:    sodium chloride 0.9%: 1000 mL  Total IN: 1000 mL    OUT:  Total OUT: 0 mL    Total NET: 1000 mL      26 Nov 2017 07:01  -  26 Nov 2017 18:08  --------------------------------------------------------  IN:    lactated ringers.: 275 mL  Total IN: 275 mL    OUT:    Intermittent Catheterization - Urethral: 180 mL  Total OUT: 180 mL    Total NET: 95 mL      LABS:                        10.5   23.0  )-----------( 455      ( 26 Nov 2017 12:40 )             35.1     11-26    139  |  100  |  21.0<H>  ----------------------------<  256<H>  4.3   |  27.0  |  1.22    Ca    8.5<L>      26 Nov 2017 12:40  Phos  4.5     11-26  Mg     1.7     11-26    TPro  6.4<L>  /  Alb  2.9<L>  /  TBili  0.1<L>  /  DBili  x   /  AST  28  /  ALT  20  /  AlkPhos  93  11-26    PT/INR - ( 26 Nov 2017 05:29 )   PT: 10.7 sec;   INR: 0.97 ratio

## 2017-11-26 NOTE — BRIEF OPERATIVE NOTE - PROCEDURE
<<-----Click on this checkbox to enter Procedure Colon resection  11/26/2017  Laparoscopic assisted sigmoid resection, mobilization of splenic flexure,  Lysis of Adhesions, Rigid proctosigmoidoscopy  Active  [id number] Colon resection  11/26/2017  Laparoscopic assisted sigmoid resection with End to side anastomosis , mobilization of splenic flexure,  Lysis of Adhesions, Rigid proctosigmoidoscopy  Active  Aneta Mike

## 2017-11-26 NOTE — BRIEF OPERATIVE NOTE - COMMENTS
POSTOP PLAN:  - Extubated to PACU then transfer to 3 [facility] bed  - IVF: LR@100 on POD#0; switch to maintenance fluids on POD#1  - Neuro: IV acetaminophen round the clock q6, morphine 2 mg IV push Q4hrs prn pain  - Incentive spirometer  - GI: zofran prn, NPO except meds, chew gum, suck hard candy on POD#0, POD#1: start CLD, Entereg 12mg q12 (First dose given preop),   - Endo: FS q6h  - Home meds: resume POD#0  - ABx: discontinue before 24hrs postop  - DVT px: SCDs at all times, SQH 5000ubits q8h  - Drains: [facility] surgical site vac to stay in place for 5 days postop (unless condition warrants removal), Sloan to be discontinued on POD#1  - Activity: PT re-evaluation, ambulate frequently, OOB/chair during waking hours when not ambulating POSTOP PLAN:  - Extubated to PACU then required reintubation in PACU for inadequate arousal and for concern for protecting her own airway  - IVF: LR@100 on POD#0; switch to maintenance fluids on POD#1  - Neuro: IV acetaminophen round the clock q6, morphine 2 mg IV push Q4hrs prn pain  - Incentive spirometer  - GI: zofran prn, NPO except meds, chew gum, suck hard candy on POD#0, POD#1: start CLD, Entereg 12mg q12 (First dose given preop),   - Endo: FS q6h  - Home meds: resume POD#0  - ABx: discontinue before 24hrs postop  - DVT px: SCDs at all times, SQH 5000ubits q8h  - Drains: [place] surgical site vac to stay in place for 5 days postop (unless condition warrants removal), Sloan to be discontinued on POD#1  - Activity: PT re-evaluation, ambulate frequently, OOB/chair during waking hours when not ambulating

## 2017-11-26 NOTE — PROGRESS NOTE ADULT - SUBJECTIVE AND OBJECTIVE BOX
HPI:  88 yo female, c/o progressively enlarging abd girth over 1 yr, s/p colonoscopy 10/19/17, found to have tubular adenoma at ileocecal valve and 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 Pt denies Nausea/vomiting, diarrhea. C/o constipation. Denies fever/chills.    Pt direct admission for operative intervention by Dr Flowers on 11/20.    Pt c/o SOB/dyspnea and mild vague chest discomfort upon admission to the floor, also c/o tender inner left lower leg, also c/o LE edema L > R and coldness of LE's    Pulmonary consult - Dr Bang,    Primary care consult - Dr Barbosa    Cardiology consult - Dr Bush (16 Nov 2017 15:13)     Allergies    Cipro (Unknown)  contrast media (iodine-based) (Unknown)  penicillins (Unknown)  shellfish (Unknown)  Valium (Unknown)    Intolerances      Renal insufficiency  Malignant neoplasm of descending colon  Microcytic anemia  ILD (interstitial lung disease)  Tricuspid valve insufficiency, unspecified etiology  Mitral valve insufficiency, unspecified etiology  Aortic valve insufficiency, etiology of cardiac valve disease unspecified  IDDM (insulin dependent diabetes mellitus)  COPD, mild  Atrial fibrillation, unspecified type  Diabetes  Cardiomegaly  Congestive heart failure, unspecified congestive heart failure chronicity, unspecified congestive heart failure type    MEDICATIONS  (STANDING):  allopurinol 100 milliGRAM(s) Oral daily  alvimopan 12 milliGRAM(s) Oral once  amylase/lipase/protease  (CREON  6,000 Units) 1 Capsule(s) Oral three times a day with meals  ascorbic acid 500 milliGRAM(s) Oral daily  calcium carbonate 1250 mG + Vitamin D (OsCal 500 + D) 1 Tablet(s) Oral two times a day  cefoTEtan  IVPB 2 Gram(s) IV Intermittent once  cefTRIAXone   IVPB 1 Gram(s) IV Intermittent every 24 hours  cefTRIAXone   IVPB      dextrose 5%. 1000 milliLiter(s) (50 mL/Hr) IV Continuous <Continuous>  dextrose 50% Injectable 12.5 Gram(s) IV Push once  dextrose 50% Injectable 25 Gram(s) IV Push once  dextrose 50% Injectable 25 Gram(s) IV Push once  diltiazem    milliGRAM(s) Oral daily  heparin  Injectable 5000 Unit(s) SubCutaneous every 8 hours  hydrALAZINE 50 milliGRAM(s) Oral two times a day  insulin lispro (HumaLOG) corrective regimen sliding scale   SubCutaneous three times a day before meals  isosorbide   mononitrate ER Tablet (IMDUR) 120 milliGRAM(s) Oral daily  lactated ringers. 1000 milliLiter(s) (100 mL/Hr) IV Continuous <Continuous>  lidocaine   Patch 1 Patch Transdermal daily  methimazole 2.5 milliGRAM(s) Oral <User Schedule>  metoprolol    tartrate Injectable 5 milliGRAM(s) IV Push every 6 hours  mirtazapine 15 milliGRAM(s) Oral daily  polyethylene glycol 3350 17 Gram(s) Oral daily  potassium chloride    Tablet ER 20 milliEquivalent(s) Oral two times a day  tiotropium 18 MICROgram(s) Capsule 1 Capsule(s) Inhalation daily    MEDICATIONS  (PRN):  ALBUTerol    0.083% 2.5 milliGRAM(s) Nebulizer every 6 hours PRN Shortness of Breath  dextrose Gel 1 Dose(s) Oral once PRN Blood Glucose LESS THAN 70 milliGRAM(s)/deciliter  fentaNYL    Injectable 25 MICROGram(s) IV Push every 5 minutes PRN Moderate Pain  glucagon  Injectable 1 milliGRAM(s) IntraMuscular once PRN Glucose LESS THAN 70 milligrams/deciliter  morphine  - Injectable 2 milliGRAM(s) IV Push every 4 hours PRN Severe Pain  ondansetron Injectable 4 milliGRAM(s) IV Push every 6 hours PRN Nausea  ondansetron Injectable 4 milliGRAM(s) IV Push once PRN Nausea and/or Vomiting                           10.5   23.0  )-----------( 455      ( 26 Nov 2017 12:40 )             35.1     11-26    139  |  100  |  21.0<H>  ----------------------------<  256<H>  4.3   |  27.0  |  1.22    Ca    8.5<L>      26 Nov 2017 12:40  Phos  4.5     11-26  Mg     1.7     11-26    TPro  6.4<L>  /  Alb  2.9<L>  /  TBili  0.1<L>  /  DBili  x   /  AST  28  /  ALT  20  /  AlkPhos  93  11-26    PT/INR - ( 26 Nov 2017 05:29 )   PT: 10.7 sec;   INR: 0.97 ratio           ;  Vital Signs Last 24 Hrs  T(C): 36 (26 Nov 2017 11:45), Max: 36.9 (26 Nov 2017 07:58)  T(F): 96.8 (26 Nov 2017 11:45), Max: 98.4 (26 Nov 2017 07:58)  HR: 72 (26 Nov 2017 17:00) (72 - 98)  BP: 138/59 (26 Nov 2017 17:00) (115/72 - 183/66)  BP(mean): --  RR: 15 (26 Nov 2017 17:00) (11 - 18)  SpO2: 100% (26 Nov 2017 17:00) (96% - 100%)      11-25 @ 07:01  -  11-26 @ 07:00  --------------------------------------------------------  IN: 1000 mL / OUT: 0 mL / NET: 1000 mL    11-26 @ 07:01 - 11-26 @ 17:46  --------------------------------------------------------  IN: 200 mL / OUT: 160 mL / NET: 40 mL      Patient intubated follows commands     HEENT: MARCOS ET NGT  Neck: Supple  Cardio: S1 S2  TRISH Irregular  Pulm: CTA Upper Lower lobes Low course crackles  Abd: Soft NT +Distension BS dec, incision clean  Rectal - refused  Ext: No DCT  Skin: No Rash  Neuro: somnolent but arousable      Malignant neoplasm of descending colon - postop extubation prolonged secondary to multiple comorbidities pain control   Renal insufficiency - Nephology following diuretic stopped increase for Pulm Edema Post op  Microcytic anemia - secondary to age and colon cancer  ILD (interstitial lung disease) - Pulm  Multivalvular Dz - Cardiology  IDDM (insulin dependent diabetes mellitus) - will lower Lantus tonight and SS  COPD, mild - cont meds Pulm  Atrial fibrillation - Rate controlled  Congestive heart failure - high risk of post op failure, careful I&O monitoring  Sepsis - on Antibiotics

## 2017-11-27 LAB
ALBUMIN SERPL ELPH-MCNC: 2.9 G/DL — LOW (ref 3.3–5.2)
ALP SERPL-CCNC: 89 U/L — SIGNIFICANT CHANGE UP (ref 40–120)
ALT FLD-CCNC: 19 U/L — SIGNIFICANT CHANGE UP
ANION GAP SERPL CALC-SCNC: 15 MMOL/L — SIGNIFICANT CHANGE UP (ref 5–17)
AST SERPL-CCNC: 22 U/L — SIGNIFICANT CHANGE UP
BILIRUB SERPL-MCNC: 0.1 MG/DL — LOW (ref 0.4–2)
BUN SERPL-MCNC: 28 MG/DL — HIGH (ref 8–20)
CALCIUM SERPL-MCNC: 9 MG/DL — SIGNIFICANT CHANGE UP (ref 8.6–10.2)
CHLORIDE SERPL-SCNC: 101 MMOL/L — SIGNIFICANT CHANGE UP (ref 98–107)
CO2 SERPL-SCNC: 25 MMOL/L — SIGNIFICANT CHANGE UP (ref 22–29)
CREAT SERPL-MCNC: 1.4 MG/DL — HIGH (ref 0.5–1.3)
EOSINOPHIL # BLD AUTO: 0 K/UL — SIGNIFICANT CHANGE UP (ref 0–0.5)
EOSINOPHIL NFR BLD AUTO: 0 % — SIGNIFICANT CHANGE UP (ref 0–6)
GAS PNL BLDA: SIGNIFICANT CHANGE UP
GAS PNL BLDA: SIGNIFICANT CHANGE UP
GLUCOSE BLDC GLUCOMTR-MCNC: 125 MG/DL — HIGH (ref 70–99)
GLUCOSE BLDC GLUCOMTR-MCNC: 148 MG/DL — HIGH (ref 70–99)
GLUCOSE BLDC GLUCOMTR-MCNC: 165 MG/DL — HIGH (ref 70–99)
GLUCOSE BLDC GLUCOMTR-MCNC: 168 MG/DL — HIGH (ref 70–99)
GLUCOSE BLDC GLUCOMTR-MCNC: 285 MG/DL — HIGH (ref 70–99)
GLUCOSE SERPL-MCNC: 195 MG/DL — HIGH (ref 70–115)
HCT VFR BLD CALC: 32.9 % — LOW (ref 37–47)
HGB BLD-MCNC: 9.4 G/DL — LOW (ref 12–16)
LYMPHOCYTES # BLD AUTO: 1.2 K/UL — SIGNIFICANT CHANGE UP (ref 1–4.8)
LYMPHOCYTES # BLD AUTO: 8.1 % — LOW (ref 20–55)
MAGNESIUM SERPL-MCNC: 3.5 MG/DL — HIGH (ref 1.6–2.6)
MCHC RBC-ENTMCNC: 21.3 PG — LOW (ref 27–31)
MCHC RBC-ENTMCNC: 28.6 G/DL — LOW (ref 32–36)
MCV RBC AUTO: 74.6 FL — LOW (ref 81–99)
MONOCYTES # BLD AUTO: 1.2 K/UL — HIGH (ref 0–0.8)
MONOCYTES NFR BLD AUTO: 8.2 % — SIGNIFICANT CHANGE UP (ref 3–10)
NEUTROPHILS # BLD AUTO: 12 K/UL — HIGH (ref 1.8–8)
NEUTROPHILS NFR BLD AUTO: 83.4 % — HIGH (ref 37–73)
NT-PROBNP SERPL-SCNC: 2852 PG/ML — HIGH (ref 0–300)
PHOSPHATE SERPL-MCNC: 3.7 MG/DL — SIGNIFICANT CHANGE UP (ref 2.4–4.7)
PLATELET # BLD AUTO: 472 K/UL — HIGH (ref 150–400)
POTASSIUM SERPL-MCNC: 4 MMOL/L — SIGNIFICANT CHANGE UP (ref 3.5–5.3)
POTASSIUM SERPL-SCNC: 4 MMOL/L — SIGNIFICANT CHANGE UP (ref 3.5–5.3)
PROT SERPL-MCNC: 6.4 G/DL — LOW (ref 6.6–8.7)
RBC # BLD: 4.41 M/UL — SIGNIFICANT CHANGE UP (ref 4.4–5.2)
RBC # FLD: 20.3 % — HIGH (ref 11–15.6)
SODIUM SERPL-SCNC: 141 MMOL/L — SIGNIFICANT CHANGE UP (ref 135–145)
TROPONIN T SERPL-MCNC: 0.04 NG/ML — SIGNIFICANT CHANGE UP (ref 0–0.06)
WBC # BLD: 14.3 K/UL — HIGH (ref 4.8–10.8)
WBC # FLD AUTO: 14.3 K/UL — HIGH (ref 4.8–10.8)

## 2017-11-27 PROCEDURE — 93010 ELECTROCARDIOGRAM REPORT: CPT

## 2017-11-27 RX ORDER — INSULIN LISPRO 100/ML
VIAL (ML) SUBCUTANEOUS EVERY 4 HOURS
Qty: 0 | Refills: 0 | Status: DISCONTINUED | OUTPATIENT
Start: 2017-11-27 | End: 2017-11-27

## 2017-11-27 RX ORDER — FENTANYL CITRATE 50 UG/ML
25 INJECTION INTRAVENOUS ONCE
Qty: 0 | Refills: 0 | Status: DISCONTINUED | OUTPATIENT
Start: 2017-11-27 | End: 2017-11-27

## 2017-11-27 RX ORDER — ACETAMINOPHEN 500 MG
1000 TABLET ORAL ONCE
Qty: 0 | Refills: 0 | Status: COMPLETED | OUTPATIENT
Start: 2017-11-27 | End: 2017-11-27

## 2017-11-27 RX ORDER — CHLORHEXIDINE GLUCONATE 213 G/1000ML
15 SOLUTION TOPICAL
Qty: 0 | Refills: 0 | Status: DISCONTINUED | OUTPATIENT
Start: 2017-11-27 | End: 2017-11-27

## 2017-11-27 RX ORDER — MIRTAZAPINE 45 MG/1
15 TABLET, ORALLY DISINTEGRATING ORAL AT BEDTIME
Qty: 0 | Refills: 0 | Status: DISCONTINUED | OUTPATIENT
Start: 2017-11-27 | End: 2017-12-06

## 2017-11-27 RX ORDER — INSULIN LISPRO 100/ML
VIAL (ML) SUBCUTANEOUS EVERY 6 HOURS
Qty: 0 | Refills: 0 | Status: DISCONTINUED | OUTPATIENT
Start: 2017-11-27 | End: 2017-12-06

## 2017-11-27 RX ADMIN — Medication 400 MILLIGRAM(S): at 01:04

## 2017-11-27 RX ADMIN — Medication 50 MILLIGRAM(S): at 18:29

## 2017-11-27 RX ADMIN — CHLORHEXIDINE GLUCONATE 15 MILLILITER(S): 213 SOLUTION TOPICAL at 09:51

## 2017-11-27 RX ADMIN — ALVIMOPAN 12 MILLIGRAM(S): 12 CAPSULE ORAL at 18:29

## 2017-11-27 RX ADMIN — Medication 400 MILLIGRAM(S): at 09:06

## 2017-11-27 RX ADMIN — MIRTAZAPINE 15 MILLIGRAM(S): 45 TABLET, ORALLY DISINTEGRATING ORAL at 21:33

## 2017-11-27 RX ADMIN — FENTANYL CITRATE 25 MICROGRAM(S): 50 INJECTION INTRAVENOUS at 02:00

## 2017-11-27 RX ADMIN — HEPARIN SODIUM 5000 UNIT(S): 5000 INJECTION INTRAVENOUS; SUBCUTANEOUS at 13:38

## 2017-11-27 RX ADMIN — Medication 8: at 00:26

## 2017-11-27 RX ADMIN — HEPARIN SODIUM 5000 UNIT(S): 5000 INJECTION INTRAVENOUS; SUBCUTANEOUS at 21:22

## 2017-11-27 RX ADMIN — Medication 1000 MILLIGRAM(S): at 09:30

## 2017-11-27 RX ADMIN — HEPARIN SODIUM 5000 UNIT(S): 5000 INJECTION INTRAVENOUS; SUBCUTANEOUS at 05:41

## 2017-11-27 RX ADMIN — Medication 100 GRAM(S): at 06:25

## 2017-11-27 RX ADMIN — Medication 1000 MILLIGRAM(S): at 01:30

## 2017-11-27 RX ADMIN — FENTANYL CITRATE 25 MICROGRAM(S): 50 INJECTION INTRAVENOUS at 02:20

## 2017-11-27 RX ADMIN — Medication 1 TABLET(S): at 06:04

## 2017-11-27 RX ADMIN — Medication 5 MILLIGRAM(S): at 13:38

## 2017-11-27 RX ADMIN — Medication 500 MILLIGRAM(S): at 11:44

## 2017-11-27 RX ADMIN — Medication 4: at 06:04

## 2017-11-27 RX ADMIN — ALVIMOPAN 12 MILLIGRAM(S): 12 CAPSULE ORAL at 11:45

## 2017-11-27 RX ADMIN — Medication 50 MILLIGRAM(S): at 06:04

## 2017-11-27 RX ADMIN — LIDOCAINE 1 PATCH: 4 CREAM TOPICAL at 18:29

## 2017-11-27 NOTE — PHYSICAL THERAPY INITIAL EVALUATION ADULT - TRANSFER SAFETY CONCERNS NOTED: SIT/STAND, REHAB EVAL
decreased safety awareness/decreased weight-shifting ability/decreased balance during turns/decreased step length

## 2017-11-27 NOTE — PROGRESS NOTE ADULT - ASSESSMENT
POD 1 s/ laparoscopic assisted sigmoid colon resection for cancer. Intubated post op. Plan to wean and extubate when stable per ICU team. Lactate level trending down. Making good amount of urine. Continue entered. Appreciate ICU cares

## 2017-11-27 NOTE — PROGRESS NOTE ADULT - ASSESSMENT
Renal function stable CBC acceptable  Likely extubated today  s/p Lap sigmoid colon resection  OK to cont IVF at 50/H

## 2017-11-27 NOTE — PROGRESS NOTE ADULT - SUBJECTIVE AND OBJECTIVE BOX
INTERVAL HPI/OVERNIGHT EVENTS/SUBJECTIVE:  Became apneic when SBT tried.  Remains on AC.    ICU Vital Signs Last 24 Hrs  T(C): 36.6 (2017 08:00), Max: 37.6 (2017 04:00)  T(F): 97.9 (2017 08:00), Max: 99.7 (2017 04:00)  HR: 74 (2017 10:00) (50 - 132)  BP: 147/67 (2017 10:00) (95/51 - 183/66)  BP(mean): 97 (2017 10:00) (69 - 97)  ABP: --  ABP(mean): --  RR: 28 (2017 10:00) (7 - 34)  SpO2: 99% (2017 10:00) (95% - 100%)      I&O's Detail    2017 07:01  -  2017 07:00  --------------------------------------------------------  IN:    dexmedetomidine Infusion: 43.6 mL    Enteral Tube Flush: 150 mL    lactated ringers.: 825 mL    Solution: 100 mL    Solution: 100 mL    Solution: 50 mL  Total IN: 1268.6 mL    OUT:    Intermittent Catheterization - Urethral: 980 mL    Nasoenteral Tube: 30 mL  Total OUT: 1010 mL    Total NET: 258.5 mL      2017 07:01  -  2017 11:12  --------------------------------------------------------  IN:    dexmedetomidine Infusion: 10.5 mL    lactated ringers.: 150 mL  Total IN: 160.5 mL    OUT:    Intermittent Catheterization - Urethral: 205 mL  Total OUT: 205 mL    Total NET: -44.5 mL          Mode: AC/ CMV (Assist Control/ Continuous Mandatory Ventilation)  RR (machine): 112  TV (machine): 370  FiO2: 40  PEEP: 5    ABG - ( 2017 03:42 )  pH: 7.36  /  pCO2: 47    /  pO2: 103   / HCO3: 25    / Base Excess: 1.0   /  SaO2: 98                  MEDICATIONS  (STANDING):  alvimopan 12 milliGRAM(s) Oral two times a day  amylase/lipase/protease  (CREON  6,000 Units) 1 Capsule(s) Oral three times a day with meals  ascorbic acid 500 milliGRAM(s) Oral daily  calcium carbonate 1250 mG + Vitamin D (OsCal 500 + D) 1 Tablet(s) Oral two times a day  chlorhexidine 0.12% Liquid 15 milliLiter(s) Swish and Spit two times a day  dexmedetomidine Infusion 0.3 MICROgram(s)/kG/Hr (4.5 mL/Hr) IV Continuous <Continuous>  dextrose 5%. 1000 milliLiter(s) (50 mL/Hr) IV Continuous <Continuous>  dextrose 50% Injectable 12.5 Gram(s) IV Push once  dextrose 50% Injectable 25 Gram(s) IV Push once  dextrose 50% Injectable 25 Gram(s) IV Push once  diltiazem    Tablet 60 milliGRAM(s) Oral every 4 hours  heparin  Injectable 5000 Unit(s) SubCutaneous every 8 hours  hydrALAZINE 50 milliGRAM(s) Oral two times a day  insulin lispro (HumaLOG) corrective regimen sliding scale   SubCutaneous every 4 hours  lactated ringers. 1000 milliLiter(s) (50 mL/Hr) IV Continuous <Continuous>  lidocaine   Patch 1 Patch Transdermal daily  methimazole 2.5 milliGRAM(s) Oral <User Schedule>  metoprolol    tartrate Injectable 5 milliGRAM(s) IV Push every 6 hours  mirtazapine 15 milliGRAM(s) Oral daily  polyethylene glycol 3350 17 Gram(s) Oral daily  tiotropium 18 MICROgram(s) Capsule 1 Capsule(s) Inhalation daily    MEDICATIONS  (PRN):  ALBUTerol    0.083% 2.5 milliGRAM(s) Nebulizer every 6 hours PRN Shortness of Breath  dextrose Gel 1 Dose(s) Oral once PRN Blood Glucose LESS THAN 70 milliGRAM(s)/deciliter  glucagon  Injectable 1 milliGRAM(s) IntraMuscular once PRN Glucose LESS THAN 70 milligrams/deciliter  ondansetron Injectable 4 milliGRAM(s) IV Push every 6 hours PRN Nausea      NUTRITION/IVF:     CENTRAL LINE:  LOCATION:   DATE INSERTED:  CVP:  SCVO2:    MOORE:   DATE INSERTED:    A-LINE:    LOCATION:   DATE INSERTED:   SVV:  CO/CI:     CHEST TUBE:  LOCATION:  DATE INSERTED: OUTPUT/24 HRS:  SUCTION/WATER SEAL:     NG/OG TUBE:  DATE INSERTED:  OUTPUT/24 HRS:    MISC:     PHYSICAL EXAM:    Gen:    Eyes:    Neurological:    ENMT:    Neck:    Pulmonary:    Cardiovascular:    Gastrointestinal:    Genitourinary:    Back:    Extremities:    Skin:    Musculoskeletal:          LABS:  CBC Full  -  ( 2017 04:51 )  WBC Count : 14.3 K/uL  Hemoglobin : 9.4 g/dL  Hematocrit : 32.9 %  Platelet Count - Automated : 472 K/uL  Mean Cell Volume : 74.6 fl  Mean Cell Hemoglobin : 21.3 pg  Mean Cell Hemoglobin Concentration : 28.6 g/dL  Auto Neutrophil # : 12.0 K/uL  Auto Lymphocyte # : 1.2 K/uL  Auto Monocyte # : 1.2 K/uL  Auto Eosinophil # : 0.0 K/uL  Auto Basophil # : x  Auto Neutrophil % : 83.4 %  Auto Lymphocyte % : 8.1 %  Auto Monocyte % : 8.2 %  Auto Eosinophil % : 0.0 %  Auto Basophil % : x    11    141  |  101  |  28.0<H>  ----------------------------<  195<H>  4.0   |  25.0  |  1.40<H>    Ca    9.0      2017 04:51  Phos  3.7     11-  Mg     3.5     -    TPro  6.4<L>  /  Alb  2.9<L>  /  TBili  0.1<L>  /  DBili  x   /  AST  22  /  ALT  19  /  AlkPhos  89  11-27    PT/INR - ( 2017 05:29 )   PT: 10.7 sec;   INR: 0.97 ratio           Urinalysis Basic - ( 2017 22:43 )    Color: Yellow / Appearance: Clear / S.015 / pH: x  Gluc: x / Ketone: Negative  / Bili: Negative / Urobili: Negative mg/dL   Blood: x / Protein: 15 mg/dL / Nitrite: Negative   Leuk Esterase: Trace / RBC: 0-2 /HPF / WBC 0-2   Sq Epi: x / Non Sq Epi: Occasional / Bacteria: Occasional      RECENT CULTURES:      LIVER FUNCTIONS - ( 2017 04:51 )  Alb: 2.9 g/dL / Pro: 6.4 g/dL / ALK PHOS: 89 U/L / ALT: 19 U/L / AST: 22 U/L / GGT: x           CARDIAC MARKERS ( 2017 04:51 )  x     / 0.04 ng/mL / x     / x     / x      CARDIAC MARKERS ( 2017 20:23 )  x     / 0.03 ng/mL / x     / x     / x      CARDIAC MARKERS ( 2017 12:40 )  x     / 0.04 ng/mL / x     / x     / x          CAPILLARY BLOOD GLUCOSE      RADIOLOGY & ADDITIONAL STUDIES:    ASSESSMENT/PLAN:  89yFemale presenting with:    Neuro:    CV:    Pulm:    GI/Nutrition:    /Renal:    ID:    Lines/Tubes:    Endo:    Skin:    Proph:    Dispo:      CRITICAL CARE TIME SPENT:

## 2017-11-27 NOTE — PHYSICAL THERAPY INITIAL EVALUATION ADULT - IMPAIRMENTS CONTRIBUTING TO GAIT DEVIATIONS, PT EVAL
cognition/decreased flexibility/decreased ROM/impaired postural control/decreased strength/pain/impaired balance

## 2017-11-27 NOTE — PHYSICAL THERAPY INITIAL EVALUATION ADULT - IMPAIRED TRANSFERS: BED/CHAIR, REHAB EVAL
decreased strength/cognition/decreased flexibility/decreased ROM/pain/impaired postural control/impaired balance

## 2017-11-27 NOTE — PROGRESS NOTE ADULT - SUBJECTIVE AND OBJECTIVE BOX
Has been stable overnight since reintubation in PACU. Given IV tylenol for pain.     Exam:  Vital Signs Last 24 Hrs  T(C): 36.6 (27 Nov 2017 08:00), Max: 37.6 (27 Nov 2017 04:00)  T(F): 97.9 (27 Nov 2017 08:00), Max: 99.7 (27 Nov 2017 04:00)  HR: 74 (27 Nov 2017 10:00) (50 - 132)  BP: 147/67 (27 Nov 2017 10:00) (95/51 - 183/66)  BP(mean): 97 (27 Nov 2017 10:00) (69 - 97)  RR: 28 (27 Nov 2017 10:00) (7 - 34)  SpO2: 99% (27 Nov 2017 10:00) (95% - 100%)    General: in no distress  Respiratory: on vent  Cardiovascular: regular rate  Abdomen: soft, non distended, prevena in place                        9.4    14.3  )-----------( 472      ( 27 Nov 2017 04:51 )             32.9   11-27    141  |  101  |  28.0<H>  ----------------------------<  195<H>  4.0   |  25.0  |  1.40<H>    Ca    9.0      27 Nov 2017 04:51  Phos  3.7     11-27  Mg     3.5     11-27    TPro  6.4<L>  /  Alb  2.9<L>  /  TBili  0.1<L>  /  DBili  x   /  AST  22  /  ALT  19  /  AlkPhos  89  11-27

## 2017-11-28 LAB
ANION GAP SERPL CALC-SCNC: 14 MMOL/L — SIGNIFICANT CHANGE UP (ref 5–17)
ANISOCYTOSIS BLD QL: SLIGHT — SIGNIFICANT CHANGE UP
BUN SERPL-MCNC: 27 MG/DL — HIGH (ref 8–20)
CALCIUM SERPL-MCNC: 9.6 MG/DL — SIGNIFICANT CHANGE UP (ref 8.6–10.2)
CHLORIDE SERPL-SCNC: 102 MMOL/L — SIGNIFICANT CHANGE UP (ref 98–107)
CO2 SERPL-SCNC: 26 MMOL/L — SIGNIFICANT CHANGE UP (ref 22–29)
CREAT SERPL-MCNC: 1.2 MG/DL — SIGNIFICANT CHANGE UP (ref 0.5–1.3)
DACRYOCYTES BLD QL SMEAR: SLIGHT — SIGNIFICANT CHANGE UP
GAS PNL BLDA: SIGNIFICANT CHANGE UP
GLUCOSE BLDC GLUCOMTR-MCNC: 113 MG/DL — HIGH (ref 70–99)
GLUCOSE BLDC GLUCOMTR-MCNC: 130 MG/DL — HIGH (ref 70–99)
GLUCOSE BLDC GLUCOMTR-MCNC: 161 MG/DL — HIGH (ref 70–99)
GLUCOSE BLDC GLUCOMTR-MCNC: 168 MG/DL — HIGH (ref 70–99)
GLUCOSE SERPL-MCNC: 116 MG/DL — HIGH (ref 70–115)
HCT VFR BLD CALC: 31.3 % — LOW (ref 37–47)
HGB BLD-MCNC: 9.4 G/DL — LOW (ref 12–16)
HYPOCHROMIA BLD QL: SLIGHT — SIGNIFICANT CHANGE UP
LYMPHOCYTES # BLD AUTO: 11 % — LOW (ref 20–55)
MACROCYTES BLD QL: SLIGHT — SIGNIFICANT CHANGE UP
MAGNESIUM SERPL-MCNC: 2.5 MG/DL — SIGNIFICANT CHANGE UP (ref 1.6–2.6)
MCHC RBC-ENTMCNC: 21.7 PG — LOW (ref 27–31)
MCHC RBC-ENTMCNC: 30 G/DL — LOW (ref 32–36)
MCV RBC AUTO: 72.1 FL — LOW (ref 81–99)
MICROCYTES BLD QL: SLIGHT — SIGNIFICANT CHANGE UP
MONOCYTES NFR BLD AUTO: 6 % — SIGNIFICANT CHANGE UP (ref 3–10)
NEUTROPHILS NFR BLD AUTO: 83 % — HIGH (ref 37–73)
OVALOCYTES BLD QL SMEAR: SLIGHT — SIGNIFICANT CHANGE UP
PHOSPHATE SERPL-MCNC: 3.2 MG/DL — SIGNIFICANT CHANGE UP (ref 2.4–4.7)
PLAT MORPH BLD: NORMAL — SIGNIFICANT CHANGE UP
PLATELET # BLD AUTO: 451 K/UL — HIGH (ref 150–400)
POIKILOCYTOSIS BLD QL AUTO: SLIGHT — SIGNIFICANT CHANGE UP
POTASSIUM SERPL-MCNC: 4.2 MMOL/L — SIGNIFICANT CHANGE UP (ref 3.5–5.3)
POTASSIUM SERPL-SCNC: 4.2 MMOL/L — SIGNIFICANT CHANGE UP (ref 3.5–5.3)
RBC # BLD: 4.34 M/UL — LOW (ref 4.4–5.2)
RBC # FLD: 20.5 % — HIGH (ref 11–15.6)
RBC BLD AUTO: ABNORMAL
SODIUM SERPL-SCNC: 142 MMOL/L — SIGNIFICANT CHANGE UP (ref 135–145)
TROPONIN T SERPL-MCNC: 0.03 NG/ML — SIGNIFICANT CHANGE UP (ref 0–0.06)
WBC # BLD: 16.8 K/UL — HIGH (ref 4.8–10.8)
WBC # FLD AUTO: 16.8 K/UL — HIGH (ref 4.8–10.8)

## 2017-11-28 PROCEDURE — 71010: CPT | Mod: 26

## 2017-11-28 PROCEDURE — 93010 ELECTROCARDIOGRAM REPORT: CPT

## 2017-11-28 RX ORDER — FUROSEMIDE 40 MG
20 TABLET ORAL ONCE
Qty: 0 | Refills: 0 | Status: COMPLETED | OUTPATIENT
Start: 2017-11-28 | End: 2017-11-28

## 2017-11-28 RX ORDER — METOPROLOL TARTRATE 50 MG
25 TABLET ORAL DAILY
Qty: 0 | Refills: 0 | Status: DISCONTINUED | OUTPATIENT
Start: 2017-11-28 | End: 2017-11-30

## 2017-11-28 RX ORDER — ACETAMINOPHEN 500 MG
1000 TABLET ORAL ONCE
Qty: 0 | Refills: 0 | Status: COMPLETED | OUTPATIENT
Start: 2017-11-28 | End: 2017-11-28

## 2017-11-28 RX ORDER — DILTIAZEM HCL 120 MG
240 CAPSULE, EXT RELEASE 24 HR ORAL DAILY
Qty: 0 | Refills: 0 | Status: DISCONTINUED | OUTPATIENT
Start: 2017-11-28 | End: 2017-11-29

## 2017-11-28 RX ORDER — METOPROLOL TARTRATE 50 MG
5 TABLET ORAL ONCE
Qty: 0 | Refills: 0 | Status: COMPLETED | OUTPATIENT
Start: 2017-11-28 | End: 2017-11-28

## 2017-11-28 RX ORDER — MORPHINE SULFATE 50 MG/1
1 CAPSULE, EXTENDED RELEASE ORAL ONCE
Qty: 0 | Refills: 0 | Status: DISCONTINUED | OUTPATIENT
Start: 2017-11-28 | End: 2017-11-28

## 2017-11-28 RX ADMIN — Medication 50 MILLIGRAM(S): at 06:58

## 2017-11-28 RX ADMIN — Medication: at 00:47

## 2017-11-28 RX ADMIN — ALVIMOPAN 12 MILLIGRAM(S): 12 CAPSULE ORAL at 06:59

## 2017-11-28 RX ADMIN — MORPHINE SULFATE 1 MILLIGRAM(S): 50 CAPSULE, EXTENDED RELEASE ORAL at 20:52

## 2017-11-28 RX ADMIN — SODIUM CHLORIDE 50 MILLILITER(S): 9 INJECTION, SOLUTION INTRAVENOUS at 02:24

## 2017-11-28 RX ADMIN — Medication 5 MILLIGRAM(S): at 13:31

## 2017-11-28 RX ADMIN — Medication 240 MILLIGRAM(S): at 09:53

## 2017-11-28 RX ADMIN — Medication 5 MILLIGRAM(S): at 20:52

## 2017-11-28 RX ADMIN — LIDOCAINE 1 PATCH: 4 CREAM TOPICAL at 08:42

## 2017-11-28 RX ADMIN — Medication 5 MILLIGRAM(S): at 00:47

## 2017-11-28 RX ADMIN — Medication 20 MILLIGRAM(S): at 20:52

## 2017-11-28 RX ADMIN — Medication 20 MILLIGRAM(S): at 20:32

## 2017-11-28 RX ADMIN — Medication 1000 MILLIGRAM(S): at 02:40

## 2017-11-28 RX ADMIN — HEPARIN SODIUM 5000 UNIT(S): 5000 INJECTION INTRAVENOUS; SUBCUTANEOUS at 14:04

## 2017-11-28 RX ADMIN — Medication 5 MILLIGRAM(S): at 17:52

## 2017-11-28 RX ADMIN — ALBUTEROL 2.5 MILLIGRAM(S): 90 AEROSOL, METERED ORAL at 20:43

## 2017-11-28 RX ADMIN — HEPARIN SODIUM 5000 UNIT(S): 5000 INJECTION INTRAVENOUS; SUBCUTANEOUS at 06:58

## 2017-11-28 RX ADMIN — Medication 400 MILLIGRAM(S): at 13:30

## 2017-11-28 RX ADMIN — MORPHINE SULFATE 1 MILLIGRAM(S): 50 CAPSULE, EXTENDED RELEASE ORAL at 21:07

## 2017-11-28 RX ADMIN — Medication 400 MILLIGRAM(S): at 02:24

## 2017-11-28 RX ADMIN — Medication 1000 MILLIGRAM(S): at 14:04

## 2017-11-28 RX ADMIN — Medication 2: at 17:48

## 2017-11-28 RX ADMIN — ALVIMOPAN 12 MILLIGRAM(S): 12 CAPSULE ORAL at 17:47

## 2017-11-28 RX ADMIN — Medication 5 MILLIGRAM(S): at 06:58

## 2017-11-28 NOTE — CHART NOTE - NSCHARTNOTEFT_GEN_A_CORE
Notified by RN that patiens is tachycardic and complaining of difficulty breathing. Patient seen and examined at bedside. Patient states she is SOB and has chest discomfort/pain.     SOB:  [X ] YES [ ] NO  Chest Discomfort: [X ] YES [ ] NO    Nausea: [ ] YES [X ] NO           Vomiting: [ ] YES [ X] NO  Sloan: NA  NGT: in place with billious fluid in cannister     Vital Signs Last 24 Hrs  T(C): 36.2 (2017 20:02), Max: 37.1 (2017 06:24)  T(F): 97.1 (2017 20:02), Max: 98.8 (2017 16:21)  HR: 106 (2017 20:02) (80 - 106)  BP: 173/78 (2017 20:02) (130/74 - 173/78)  BP(mean): --  RR: 18 (2017 20:02) (18 - 20)  SpO2: 95% (2017 20:02) (91% - 97%)    Vitals performed at bedside post medication administration /76 Pulse 98 R 18 Sp02 98 % RA     LABS:                        9.4    16.8  )-----------( 451      ( 2017 06:57 )             31.3         142  |  102  |  27.0<H>  ----------------------------<  116<H>  4.2   |  26.0  |  1.20    Ca    9.6      2017 06:57  Phos  3.2       Mg     2.5         TPro  6.4<L>  /  Alb  2.9<L>  /  TBili  0.1<L>  /  DBili  x   /  AST  22  /  ALT  19  /  AlkPhos  89        Urinalysis Basic - ( 2017 22:43 )    Color: Yellow / Appearance: Clear / S.015 / pH: x  Gluc: x / Ketone: Negative  / Bili: Negative / Urobili: Negative mg/dL   Blood: x / Protein: 15 mg/dL / Nitrite: Negative   Leuk Esterase: Trace / RBC: 0-2 /HPF / WBC 0-2   Sq Epi: x / Non Sq Epi: Occasional / Bacteria: Occasional        RADIOLOGY & ADDITIONAL STUDIES:    PHYSICAL EXAM:      Constitutional: alert and responsive, distressed breathing rapidly and moaning that she is uncomfortable     Neck: JVD, trachea midline     Respiratory: B/L crackles at bases, fair air movement bilaterally     Cardiovascular: s1s2, tachycardic , irregular     Gastrointestinal: distended, soft, TTP over surgical site, prevena in place, bs+    Genitourinary: deferred    Rectal: deferred     Extremities: warm, compartment soft, no calf pain    Neurological: grossly intact         A/P: 89y POD 2 s/p lap sigmoid resection with tachycardia afib with RVR tachypnic secondary to fluid overload. Patient responded appropriately to IV lasix, IV lopressor, pain medication vitals signs stable     -EKG performed at bedside, afib with RVR  lopressor 5 mg given IVP with appropriate response  -CXR performed at bedside, pulmonary congestion increased vascular markings  lasix 40mg IV  Sloan strict I/O  -Morphine 1mg x2 given for pain control tolerated well  -Blood gas STAT  -2 L NC  -Monitored bed  -Consult Cardiology  -Trop STAT  -Follow up lab Notified by RN that patiens is tachycardic and complaining of difficulty breathing. Patient seen and examined at bedside. Patient states she is SOB and has chest discomfort/pain.     SOB:  [X ] YES [ ] NO  Chest Discomfort: [X ] YES [ ] NO    Nausea: [ ] YES [X ] NO           Vomiting: [ ] YES [ X] NO  Sloan: NA  NGT: in place with billious fluid in cannister     Vital Signs Last 24 Hrs  T(C): 36.2 (2017 20:02), Max: 37.1 (2017 06:24)  T(F): 97.1 (2017 20:02), Max: 98.8 (2017 16:21)  HR: 106 (2017 20:02) (80 - 106)  BP: 173/78 (2017 20:02) (130/74 - 173/78)  BP(mean): --  RR: 18 (2017 20:02) (18 - 20)  SpO2: 95% (2017 20:02) (91% - 97%)    Vitals performed at bedside post medication administration /76 Pulse 98 R 18 Sp02 98 % RA     LABS:                        9.4    16.8  )-----------( 451      ( 2017 06:57 )             31.3         142  |  102  |  27.0<H>  ----------------------------<  116<H>  4.2   |  26.0  |  1.20    Ca    9.6      2017 06:57  Phos  3.2       Mg     2.5         TPro  6.4<L>  /  Alb  2.9<L>  /  TBili  0.1<L>  /  DBili  x   /  AST  22  /  ALT  19  /  AlkPhos  89        Urinalysis Basic - ( 2017 22:43 )    Color: Yellow / Appearance: Clear / S.015 / pH: x  Gluc: x / Ketone: Negative  / Bili: Negative / Urobili: Negative mg/dL   Blood: x / Protein: 15 mg/dL / Nitrite: Negative   Leuk Esterase: Trace / RBC: 0-2 /HPF / WBC 0-2   Sq Epi: x / Non Sq Epi: Occasional / Bacteria: Occasional        RADIOLOGY & ADDITIONAL STUDIES:    PHYSICAL EXAM:      Constitutional: alert and responsive, distressed breathing rapidly and moaning that she is uncomfortable     Neck: JVD, trachea midline     Respiratory: B/L crackles at bases, fair air movement bilaterally     Cardiovascular: s1s2, tachycardic , irregular     Gastrointestinal: distended, soft, TTP over surgical site, prevena in place, bs+    Genitourinary: deferred    Rectal: deferred     Extremities: warm, compartment soft, no calf pain    Neurological: grossly intact         A/P: 89y POD 2 s/p lap sigmoid resection with tachycardia afib with RVR tachypnic secondary to fluid overload. Patient responded appropriately to IV lasix, IV lopressor, pain medication vitals signs stable     -EKG performed at bedside, afib with RVR  lopressor 5 mg given IVP with appropriate response  -CXR performed at bedside, pulmonary congestion increased vascular markings  lasix 40mg IV  Sloan strict I/O  -Morphine 1mg x2 given for pain control tolerated well  -Blood gas STAT  -2 L NC  -Monitored bed  -Consult Cardiology incident discussed with Dr. Whipple from [facility] will follow up recommendations when available   -Trop STAT negative   -BNP in AM

## 2017-11-28 NOTE — PROGRESS NOTE ADULT - SUBJECTIVE AND OBJECTIVE BOX
Patient seen and examined    Feels better, more herself  extubated yesterday on NCO  no c/o CP SOB NV Abd pain  No swelling feet  Has some bluish NGD    Vital Signs Last 24 Hrs  T(C): 37.1 (28 Nov 2017 16:21), Max: 37.2 (27 Nov 2017 20:00)  T(F): 98.8 (28 Nov 2017 16:21), Max: 99 (27 Nov 2017 20:00)  HR: 88 (28 Nov 2017 16:21) (74 - 91)  BP: 159/73 (28 Nov 2017 16:21) (130/74 - 159/73)  BP(mean): 97 (27 Nov 2017 20:00) (97 - 97)  RR: 19 (28 Nov 2017 16:21) (16 - 20)  SpO2: 91% (28 Nov 2017 16:21) (91% - 100%)    PHYSICAL EXAM    GENERAL: NAD,   EYES:  conjunctiva and sclera clear  NECK: Supple, No JVD/Bruit  NERVOUS SYSTEM:  A/O x3,   CHEST:  CTA ,No rales or rhonchi  HEART:  RRR, No murmurs  ABDOMEN: Soft, NT/ND BS+  EXTREMITIES:  No Edema;  SKIN: No rashes    28 Nov 2017 06:57    142    |  102    |  27.0   ----------------------------<  116    4.2     |  26.0   |  1.20     Ca    9.6        28 Nov 2017 06:57  Phos  3.2       28 Nov 2017 06:57  Mg     2.5       28 Nov 2017 06:57    TPro  6.4    /  Alb  2.9    /  TBili  0.1    /  DBili  x      /  AST  22     /  ALT  19     /  AlkPhos  89     27 Nov 2017 04:51                          9.4    16.8  )-----------( 451      ( 28 Nov 2017 06:57 )             31.3       Creat/Lytes/BUN normal  Hb stable  Remains NPO, plans per surg for POFs  Continue same treatment

## 2017-11-28 NOTE — PROGRESS NOTE ADULT - SUBJECTIVE AND OBJECTIVE BOX
89y Female s/p laparoscopic partial colectomy under GETA on 11/26/17    T(C): 36.7 (11-28-17 @ 08:07), Max: 37.2 (11-27-17 @ 20:00)  HR: 91 (11-28-17 @ 08:07) (71 - 91)  BP: 130/74 (11-28-17 @ 08:07) (130/74 - 158/70)  RR: 18 (11-28-17 @ 08:07) (16 - 20)  SpO2: 97% (11-28-17 @ 08:07) (92% - 100%)  Wt(kg): --    Pt seen, doing well, no anesthesia complications or complaints noted or reported.   No Nausea  Pain well controlled

## 2017-11-28 NOTE — PROGRESS NOTE ADULT - SUBJECTIVE AND OBJECTIVE BOX
SURGICAL PA NOTE:     STATUS POST:       Pre-Op Diagnosis:  Colon obstruction  2017    Active  Aneta Mike  Malignant neoplasm of sigmoid colon  2017  Sigmoid colon mass  Active  Aneta Mike.     Post-Op Dx:  Colon obstruction  2017    Active  Aneta Mike  Intra-abdominal adhesions  2017    Active  Aneta Mike  Malignant neoplasm of sigmoid colon  2017    Active  Aneta Mike.    Procedure:    Procedure:  Colon resection  2017  Laparoscopic assisted sigmoid resection with End to side anastomosis , mobilization of splenic flexure,  Lysis of Adhesions, Rigid proctosigmoidoscopy  Active  Aneta Mike. Colon resection  2017  Laparoscopic assisted sigmoid resection, mobilization of splenic flexure,  Lysis of Adhesions, Rigid proctosigmoidoscopy  Active  [id number]         Operative Findings:  · Operative Findings	Sigmoid colon mass - previously Tattooed during colonoscopy (tattooing present proximal and distal to palpable sigmoid mass). All Donuts retrieved at endorectal stapler firing End to Side anastomosis performed without tension Proctosigmoidoscopy - air bubble test negative for leak	  	 Sigmoid colon mass - previously Tattooed during colonoscopy (tattooing present proximal and distal to palpable sigmoid mass). All Donuts retrieved at endorectal stapler firing Side to end anastomosis performed without tension Proctosigmoidoscopy - air bubble test negative for leak 	    Specimens/Blood Loss/IV/Output/Protocol/VTE:    Specimens/Blood Loss/IV/Output/Protocol/VTE:  · Specimens	Resected sigmoid colon	  · Drains	Provina Vac dressing applied to skin (will remain place for 5 days post op)	  · Estimated Blood Loss	30 milliLiter(s)	      POST OPERATIVE DAY #: 2    Vital Signs Last 24 Hrs  T(C): 36.7 (2017 08:07), Max: 37.2 (2017 12:00)  T(F): 98 (2017 08:07), Max: 99 (2017 12:00)  HR: 91 (2017 08:07) (71 - 91)  BP: 130/74 (2017 08:07) (124/94 - 158/70)  BP(mean): 97 (2017 20:00) (85 - 113)  RR: 18 (2017 08:07) (15 - 27)  SpO2: 97% (2017 08:07) (92% - 100%)    HPI:  89 yo female, c/o progressively enlarging abd girth over 1 yr, s/p colonoscopy 10/19/17, found to have tubular adenoma at ileocecal valve and 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 Pt denies Nausea/vomiting, diarrhea. C/o constipation. Denies fever/chills.    Pt direct admission for operative intervention by Dr Flowers on .    Pt c/o SOB/dyspnea and mild vague chest discomfort upon admission to the floor, also c/o tender inner left lower leg, also c/o LE edema L > R and coldness of LE's    Pulmonary consult - Dr Bang,    Primary care consult - Dr Barbosa    Cardiology consult - Dr Bush (2017 15:13)      Small bowel obstruction  Unknown h/o HF  H/o or current diagnosis of HF- ACEI/ARB contraindication unknown  No pertinent family history in first degree relatives  Handoff  MEWS Score  Renal insufficiency  Malignant neoplasm of descending colon  Microcytic anemia  ILD (interstitial lung disease)  Tricuspid valve insufficiency, unspecified etiology  Mitral valve insufficiency, unspecified etiology  Aortic valve insufficiency, etiology of cardiac valve disease unspecified  IDDM (insulin dependent diabetes mellitus)  COPD, mild  Atrial fibrillation, unspecified type  Diabetes  Cardiomegaly  Congestive heart failure, unspecified congestive heart failure chronicity, unspecified congestive heart failure type  Intra-abdominal adhesions  Colon obstruction  Malignant neoplasm of sigmoid colon  Malignant neoplasm  Colon obstruction  Malignant neoplasm of sigmoid colon  Malignant neoplasm of descending colon  Colon resection  H/O: hysterectomy  History of laparoscopic cholecystectomy  H/O splenectomy  UNSP INTESTNL OBST, UNSP AS TO      SUBJECTIVE: Pt seen supine with HOB up, c/o abd pain, feels bloated, denies flatus/BM,     Diet:    Activity:     Fevers: [ ]Yes [ ]NO  Chills: [ ] Yes [ ] NO  SOB:  [ ] YES [ ] NO  Dyspnea: [ ]YES [ ]NO  Chest Discomfort: [ ] YES [ ] NO    Nausea: [ ] YES [ ] NO           Vomiting: [ ] YES [ ] NO  Flatus: [ ] YES [ ] NO             Bowel Movement: [ ] YES [ ] NO  Diarrhea: [ ] YES [ ] NO         Void: [ ]YES [ ]No  Constipation: [ ] YES [ ] NO       Pain (0-10):              Pain Control Adequate: [ ] YES [ ] NO    Sloan:    NGT:      I&O's Detail    2017 07:  -  2017 07:00  --------------------------------------------------------  IN:    dexmedetomidine Infusion: 10.5 mL    Enteral Tube Flush: 235 mL    lactated ringers.: 700 mL  Total IN: 945.5 mL    OUT:    Intermittent Catheterization - Urethral: 440 mL    Voided: 75 mL  Total OUT: 515 mL    Total NET: 430.5 mL        I&O's Summary    2017 07: 07:00  --------------------------------------------------------  IN: 945.5 mL / OUT: 515 mL / NET: 430.5 mL      I&O's Detail    2017 07: 07:00  --------------------------------------------------------  IN:    dexmedetomidine Infusion: 10.5 mL    Enteral Tube Flush: 235 mL    lactated ringers.: 700 mL  Total IN: 945.5 mL    OUT:    Intermittent Catheterization - Urethral: 440 mL    Voided: 75 mL  Total OUT: 515 mL    Total NET: 430.5 mL          MEDICATIONS  (STANDING):  alvimopan 12 milliGRAM(s) Oral two times a day  amylase/lipase/protease  (CREON  6,000 Units) 1 Capsule(s) Oral three times a day with meals  ascorbic acid 500 milliGRAM(s) Oral daily  calcium carbonate 1250 mG + Vitamin D (OsCal 500 + D) 1 Tablet(s) Oral two times a day  dextrose 5%. 1000 milliLiter(s) (50 mL/Hr) IV Continuous <Continuous>  dextrose 50% Injectable 12.5 Gram(s) IV Push once  dextrose 50% Injectable 25 Gram(s) IV Push once  dextrose 50% Injectable 25 Gram(s) IV Push once  diltiazem    milliGRAM(s) Oral daily  heparin  Injectable 5000 Unit(s) SubCutaneous every 8 hours  hydrALAZINE 50 milliGRAM(s) Oral two times a day  insulin lispro (HumaLOG) corrective regimen sliding scale   SubCutaneous every 6 hours  lactated ringers. 1000 milliLiter(s) (50 mL/Hr) IV Continuous <Continuous>  lidocaine   Patch 1 Patch Transdermal daily  methimazole 2.5 milliGRAM(s) Oral <User Schedule>  metoprolol    tartrate Injectable 5 milliGRAM(s) IV Push every 6 hours  mirtazapine 15 milliGRAM(s) Oral at bedtime  tiotropium 18 MICROgram(s) Capsule 1 Capsule(s) Inhalation daily    MEDICATIONS  (PRN):  ALBUTerol    0.083% 2.5 milliGRAM(s) Nebulizer every 6 hours PRN Shortness of Breath  dextrose Gel 1 Dose(s) Oral once PRN Blood Glucose LESS THAN 70 milliGRAM(s)/deciliter  glucagon  Injectable 1 milliGRAM(s) IntraMuscular once PRN Glucose LESS THAN 70 milligrams/deciliter  ondansetron Injectable 4 milliGRAM(s) IV Push every 6 hours PRN Nausea      LABS:                        9.4    16.8  )-----------( 451      ( 2017 06:57 )             31.3         142  |  102  |  27.0<H>  ----------------------------<  116<H>  4.2   |  26.0  |  1.20    Ca    9.6      2017 06:57  Phos  3.2       Mg     2.5         TPro  6.4<L>  /  Alb  2.9<L>  /  TBili  0.1<L>  /  DBili  x   /  AST  22  /  ALT  19  /  AlkPhos  89        Urinalysis Basic - ( 2017 22:43 )    Color: Yellow / Appearance: Clear / S.015 / pH: x  Gluc: x / Ketone: Negative  / Bili: Negative / Urobili: Negative mg/dL   Blood: x / Protein: 15 mg/dL / Nitrite: Negative   Leuk Esterase: Trace / RBC: 0-2 /HPF / WBC 0-2   Sq Epi: x / Non Sq Epi: Occasional / Bacteria: Occasional      ABG - ( 2017 03:42 )  pH: 7.36  /  pCO2: 47    /  pO2: 103   / HCO3: 25    / Base Excess: 1.0   /  SaO2: 98                  RADIOLOGY & ADDITIONAL STUDIES: SURGICAL PA NOTE:     STATUS POST:       Pre-Op Diagnosis:  Colon obstruction  2017    Active  Aneta Mike  Malignant neoplasm of sigmoid colon  2017  Sigmoid colon mass  Active  Aneta Mike.     Post-Op Dx:  Colon obstruction  2017    Active  Aneta Mike  Intra-abdominal adhesions  2017    Active  Aneta Mike  Malignant neoplasm of sigmoid colon  2017    Active  Aneta Mike.    Procedure:    Procedure:  Colon resection  2017  Laparoscopic assisted sigmoid resection with End to side anastomosis , mobilization of splenic flexure,  Lysis of Adhesions, Rigid proctosigmoidoscopy  Active  Aneta Mike. Colon resection  2017  Laparoscopic assisted sigmoid resection, mobilization of splenic flexure,  Lysis of Adhesions, Rigid proctosigmoidoscopy  Active  [id number]         Operative Findings:  · Operative Findings	Sigmoid colon mass - previously Tattooed during colonoscopy (tattooing present proximal and distal to palpable sigmoid mass). All Donuts retrieved at endorectal stapler firing End to Side anastomosis performed without tension Proctosigmoidoscopy - air bubble test negative for leak	  	 Sigmoid colon mass - previously Tattooed during colonoscopy (tattooing present proximal and distal to palpable sigmoid mass). All Donuts retrieved at endorectal stapler firing Side to end anastomosis performed without tension Proctosigmoidoscopy - air bubble test negative for leak 	    Specimens/Blood Loss/IV/Output/Protocol/VTE:    Specimens/Blood Loss/IV/Output/Protocol/VTE:  · Specimens	Resected sigmoid colon	  · Drains	Provina Vac dressing applied to skin (will remain place for 5 days post op)	  · Estimated Blood Loss	30 milliLiter(s)	      POST OPERATIVE DAY #: 2    Vital Signs Last 24 Hrs  T(C): 36.7 (2017 08:07), Max: 37.2 (2017 12:00)  T(F): 98 (2017 08:07), Max: 99 (2017 12:00)  HR: 91 (2017 08:07) (71 - 91)  BP: 130/74 (2017 08:07) (124/94 - 158/70)  BP(mean): 97 (2017 20:00) (85 - 113)  RR: 18 (2017 08:07) (15 - 27)  SpO2: 97% (2017 08:07) (92% - 100%)    HPI:  89 yo female, c/o progressively enlarging abd girth over 1 yr, s/p colonoscopy 10/19/17, found to have tubular adenoma at ileocecal valve and 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 Pt denies Nausea/vomiting, diarrhea. C/o constipation. Denies fever/chills.    Pt direct admission for operative intervention by Dr Flowers on .    Pt c/o SOB/dyspnea and mild vague chest discomfort upon admission to the floor, also c/o tender inner left lower leg, also c/o LE edema L > R and coldness of LE's    Pulmonary consult - Dr Bang,    Primary care consult - Dr Barbosa    Cardiology consult - Dr Bush (2017 15:13)      Small bowel obstruction  Unknown h/o HF  H/o or current diagnosis of HF- ACEI/ARB contraindication unknown  No pertinent family history in first degree relatives  Handoff  MEWS Score  Renal insufficiency  Malignant neoplasm of descending colon  Microcytic anemia  ILD (interstitial lung disease)  Tricuspid valve insufficiency, unspecified etiology  Mitral valve insufficiency, unspecified etiology  Aortic valve insufficiency, etiology of cardiac valve disease unspecified  IDDM (insulin dependent diabetes mellitus)  COPD, mild  Atrial fibrillation, unspecified type  Diabetes  Cardiomegaly  Congestive heart failure, unspecified congestive heart failure chronicity, unspecified congestive heart failure type  Intra-abdominal adhesions  Colon obstruction  Malignant neoplasm of sigmoid colon  Malignant neoplasm  Colon obstruction  Malignant neoplasm of sigmoid colon  Malignant neoplasm of descending colon  Colon resection  H/O: hysterectomy  History of laparoscopic cholecystectomy  H/O splenectomy  UNSP INTESTNL OBST, UNSP AS TO      SUBJECTIVE: Pt seen supine with HOB up, c/o abd pain, feels bloated, denies flatus/BM, mildly confused but answered questions appropiately, NGT indwelling, pt transferred out of ICU last pm after successful extubation     Diet: NPO    Activity: bedrest to OOB to chair    Fevers: [ ]Yes [x ]NO  Chills: [ ] Yes [ x] NO  SOB:  [ ] YES [x ] NO  Dyspnea: [ ]YES [x ]NO  Chest Discomfort: [ ] YES [ x] NO    Nausea: [ ] YES [ x] NO           Vomiting: [ ] YES [x ] NO  Flatus: [ ] YES [x ] NO             Bowel Movement: [ ] YES [x ] NO  Diarrhea: [ ] YES [x ] NO         Void: [ ]YES [ ]No  Constipation: [ ] YES [ ] NO       Pain (0-10):  3             Pain Control Adequate: [x ] YES [ ] NO    Sloan:    EDILMAT:      I&O's Detail    2017 07: 07:00  --------------------------------------------------------  IN:    dexmedetomidine Infusion: 10.5 mL    Enteral Tube Flush: 235 mL    lactated ringers.: 700 mL  Total IN: 945.5 mL    OUT:    Intermittent Catheterization - Urethral: 440 mL    Voided: 75 mL  Total OUT: 515 mL    Total NET: 430.5 mL        I&O's Summary    2017 07: 07:00  --------------------------------------------------------  IN: 945.5 mL / OUT: 515 mL / NET: 430.5 mL      I&O's Detail    2017 07:  -  2017 07:00  --------------------------------------------------------  IN:    dexmedetomidine Infusion: 10.5 mL    Enteral Tube Flush: 235 mL    lactated ringers.: 700 mL  Total IN: 945.5 mL    OUT:    Intermittent Catheterization - Urethral: 440 mL    Voided: 75 mL  Total OUT: 515 mL    Total NET: 430.5 mL          MEDICATIONS  (STANDING):  alvimopan 12 milliGRAM(s) Oral two times a day  amylase/lipase/protease  (CREON  6,000 Units) 1 Capsule(s) Oral three times a day with meals  ascorbic acid 500 milliGRAM(s) Oral daily  calcium carbonate 1250 mG + Vitamin D (OsCal 500 + D) 1 Tablet(s) Oral two times a day  dextrose 5%. 1000 milliLiter(s) (50 mL/Hr) IV Continuous <Continuous>  dextrose 50% Injectable 12.5 Gram(s) IV Push once  dextrose 50% Injectable 25 Gram(s) IV Push once  dextrose 50% Injectable 25 Gram(s) IV Push once  diltiazem    milliGRAM(s) Oral daily  heparin  Injectable 5000 Unit(s) SubCutaneous every 8 hours  hydrALAZINE 50 milliGRAM(s) Oral two times a day  insulin lispro (HumaLOG) corrective regimen sliding scale   SubCutaneous every 6 hours  lactated ringers. 1000 milliLiter(s) (50 mL/Hr) IV Continuous <Continuous>  lidocaine   Patch 1 Patch Transdermal daily  methimazole 2.5 milliGRAM(s) Oral <User Schedule>  metoprolol    tartrate Injectable 5 milliGRAM(s) IV Push every 6 hours  mirtazapine 15 milliGRAM(s) Oral at bedtime  tiotropium 18 MICROgram(s) Capsule 1 Capsule(s) Inhalation daily    MEDICATIONS  (PRN):  ALBUTerol    0.083% 2.5 milliGRAM(s) Nebulizer every 6 hours PRN Shortness of Breath  dextrose Gel 1 Dose(s) Oral once PRN Blood Glucose LESS THAN 70 milliGRAM(s)/deciliter  glucagon  Injectable 1 milliGRAM(s) IntraMuscular once PRN Glucose LESS THAN 70 milligrams/deciliter  ondansetron Injectable 4 milliGRAM(s) IV Push every 6 hours PRN Nausea      LABS:                        9.4    16.8  )-----------( 451      ( 2017 06:57 )             31.3     -    142  |  102  |  27.0<H>  ----------------------------<  116<H>  4.2   |  26.0  |  1.20    Ca    9.6      2017 06:57  Phos  3.2       Mg     2.5         TPro  6.4<L>  /  Alb  2.9<L>  /  TBili  0.1<L>  /  DBili  x   /  AST  22  /  ALT  19  /  AlkPhos  89  -      Urinalysis Basic - ( 2017 22:43 )    Color: Yellow / Appearance: Clear / S.015 / pH: x  Gluc: x / Ketone: Negative  / Bili: Negative / Urobili: Negative mg/dL   Blood: x / Protein: 15 mg/dL / Nitrite: Negative   Leuk Esterase: Trace / RBC: 0-2 /HPF / WBC 0-2   Sq Epi: x / Non Sq Epi: Occasional / Bacteria: Occasional      ABG - ( 2017 03:42 )  pH: 7.36  /  pCO2: 47    /  pO2: 103   / HCO3: 25    / Base Excess: 1.0   /  SaO2: 98                  RADIOLOGY & ADDITIONAL STUDIES:

## 2017-11-28 NOTE — PROGRESS NOTE ADULT - SUBJECTIVE AND OBJECTIVE BOX
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 Pt denies Nausea/vomiting, diarrhea. C/o constipation. Denies fever/chills.    Pt direct admission for operative intervention by Dr Flowers on .    Pt c/o SOB/dyspnea and mild vague chest discomfort upon admission to the floor, also c/o tender inner left lower leg, also c/o LE edema L > R and coldness of LE's    Pulmonary consult - Dr Bang,    Primary care consult - Dr Barbsoa    Cardiology consult - Dr Bush (2017 15:13)     Allergies    Cipro (Unknown)  contrast media (iodine-based) (Unknown)  penicillins (Unknown)  shellfish (Unknown)  Valium (Unknown)    Intolerances      Renal insufficiency  Malignant neoplasm of descending colon  Microcytic anemia  ILD (interstitial lung disease)  Tricuspid valve insufficiency, unspecified etiology  Mitral valve insufficiency, unspecified etiology  Aortic valve insufficiency, etiology of cardiac valve disease unspecified  IDDM (insulin dependent diabetes mellitus)  COPD, mild  Atrial fibrillation, unspecified type  Diabetes  Cardiomegaly  Congestive heart failure, unspecified congestive heart failure chronicity, unspecified congestive heart failure type    MEDICATIONS  (STANDING):  alvimopan 12 milliGRAM(s) Oral two times a day  amylase/lipase/protease  (CREON  6,000 Units) 1 Capsule(s) Oral three times a day with meals  ascorbic acid 500 milliGRAM(s) Oral daily  calcium carbonate 1250 mG + Vitamin D (OsCal 500 + D) 1 Tablet(s) Oral two times a day  dextrose 5%. 1000 milliLiter(s) (50 mL/Hr) IV Continuous <Continuous>  dextrose 50% Injectable 12.5 Gram(s) IV Push once  dextrose 50% Injectable 25 Gram(s) IV Push once  dextrose 50% Injectable 25 Gram(s) IV Push once  diltiazem    milliGRAM(s) Oral daily  heparin  Injectable 5000 Unit(s) SubCutaneous every 8 hours  hydrALAZINE 50 milliGRAM(s) Oral two times a day  insulin lispro (HumaLOG) corrective regimen sliding scale   SubCutaneous every 6 hours  lactated ringers. 1000 milliLiter(s) (50 mL/Hr) IV Continuous <Continuous>  lidocaine   Patch 1 Patch Transdermal daily  methimazole 2.5 milliGRAM(s) Oral <User Schedule>  metoprolol    tartrate Injectable 5 milliGRAM(s) IV Push every 6 hours  mirtazapine 15 milliGRAM(s) Oral at bedtime  tiotropium 18 MICROgram(s) Capsule 1 Capsule(s) Inhalation daily    MEDICATIONS  (PRN):  ALBUTerol    0.083% 2.5 milliGRAM(s) Nebulizer every 6 hours PRN Shortness of Breath  dextrose Gel 1 Dose(s) Oral once PRN Blood Glucose LESS THAN 70 milliGRAM(s)/deciliter  glucagon  Injectable 1 milliGRAM(s) IntraMuscular once PRN Glucose LESS THAN 70 milligrams/deciliter  ondansetron Injectable 4 milliGRAM(s) IV Push every 6 hours PRN Nausea       Vital Signs Last 24 Hrs  T(C): 37.1   HR: 90  BP: 150/74  RR: 18  SpO2: 91%          Patient feeling better No CP, No SOB, No N/V    HEENT: PEARLA  NGT  Neck: Supple  Cardio: S1 S2  TRISH Irregular  Pulm: CTA Upper Lower lobes Low course crackles  Abd: Soft NT +Distension BS dec, incision clean  Rectal - refused  Ext: No DCT  Skin: No Rash  Neuro: Awake Pleaseant      Malignant neoplasm of descending colon - extubated Pain control  Renal insufficiency - Nephology following diuretic   Microcytic anemia - secondary to age and colon cancer  ILD (interstitial lung disease) - Pulm  Multivalvular Dz - Cardiology  IDDM (insulin dependent diabetes mellitus) -  Lantus tonight and SS  COPD, mild - cont meds Pulm  Atrial fibrillation - Rate controlled  Congestive heart failure - high risk of post op failure, careful I&O monitoring      Spoke with  and Granddaughter      Note 17    89 yo female, c/o progressively enlarging abd girth over 1 yr, s/p colonoscopy 10/19/17, found to have tubular adenoma at ileocecal valve and 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 Pt denies Nausea/vomiting, diarrhea. C/o constipation. Denies fever/chills.    Pt direct admission for operative intervention by Dr Flowers on .    Pt c/o SOB/dyspnea and mild vague chest discomfort upon admission to the floor, also c/o tender inner left lower leg, also c/o LE edema L > R and coldness of LE's    Pulmonary consult - Dr Bang,    Primary care consult - Dr Barbosa    Cardiology consult - Dr Bush (2017 15:13)     Allergies    Cipro (Unknown)  contrast media (iodine-based) (Unknown)  penicillins (Unknown)  shellfish (Unknown)  Valium (Unknown)    Intolerances      Renal insufficiency  Malignant neoplasm of descending colon  Microcytic anemia  ILD (interstitial lung disease)  Tricuspid valve insufficiency, unspecified etiology  Mitral valve insufficiency, unspecified etiology  Aortic valve insufficiency, etiology of cardiac valve disease unspecified  IDDM (insulin dependent diabetes mellitus)  COPD, mild  Atrial fibrillation, unspecified type  Diabetes  Cardiomegaly  Congestive heart failure, unspecified congestive heart failure chronicity, unspecified congestive heart failure type    MEDICATIONS  (STANDING):  alvimopan 12 milliGRAM(s) Oral two times a day  amylase/lipase/protease  (CREON  6,000 Units) 1 Capsule(s) Oral three times a day with meals  ascorbic acid 500 milliGRAM(s) Oral daily  calcium carbonate 1250 mG + Vitamin D (OsCal 500 + D) 1 Tablet(s) Oral two times a day  dextrose 5%. 1000 milliLiter(s) (50 mL/Hr) IV Continuous <Continuous>  dextrose 50% Injectable 12.5 Gram(s) IV Push once  dextrose 50% Injectable 25 Gram(s) IV Push once  dextrose 50% Injectable 25 Gram(s) IV Push once  diltiazem    milliGRAM(s) Oral daily  heparin  Injectable 5000 Unit(s) SubCutaneous every 8 hours  hydrALAZINE 50 milliGRAM(s) Oral two times a day  insulin lispro (HumaLOG) corrective regimen sliding scale   SubCutaneous every 6 hours  lactated ringers. 1000 milliLiter(s) (50 mL/Hr) IV Continuous <Continuous>  lidocaine   Patch 1 Patch Transdermal daily  methimazole 2.5 milliGRAM(s) Oral <User Schedule>  metoprolol    tartrate Injectable 5 milliGRAM(s) IV Push every 6 hours  mirtazapine 15 milliGRAM(s) Oral at bedtime  tiotropium 18 MICROgram(s) Capsule 1 Capsule(s) Inhalation daily    MEDICATIONS  (PRN):  ALBUTerol    0.083% 2.5 milliGRAM(s) Nebulizer every 6 hours PRN Shortness of Breath  dextrose Gel 1 Dose(s) Oral once PRN Blood Glucose LESS THAN 70 milliGRAM(s)/deciliter  glucagon  Injectable 1 milliGRAM(s) IntraMuscular once PRN Glucose LESS THAN 70 milligrams/deciliter  ondansetron Injectable 4 milliGRAM(s) IV Push every 6 hours PRN Nausea                           9.4    16.8  )-----------( 451      ( 2017 06:57 )             31.3         142  |  102  |  27.0<H>  ----------------------------<  116<H>  4.2   |  26.0  |  1.20    Ca    9.6      2017 06:57  Phos  3.2       Mg     2.5         TPro  6.4<L>  /  Alb  2.9<L>  /  TBili  0.1<L>  /  DBili  x   /  AST  22  /  ALT  19  /  AlkPhos  89        Urinalysis Basic - ( 2017 22:43 )    Color: Yellow / Appearance: Clear / S.015 / pH: x  Gluc: x / Ketone: Negative  / Bili: Negative / Urobili: Negative mg/dL   Blood: x / Protein: 15 mg/dL / Nitrite: Negative   Leuk Esterase: Trace / RBC: 0-2 /HPF / WBC 0-2   Sq Epi: x / Non Sq Epi: Occasional / Bacteria: Occasional    ;  Vital Signs Last 24 Hrs  T(C): 37.1 (2017 16:21), Max: 37.2 (2017 20:00)  T(F): 98.8 (2017 16:21), Max: 99 (2017 20:00)  HR: 88 (2017 16:21) (71 - 91)  BP: 159/73 (2017 16:21) (130/74 - 159/73)  BP(mean): 97 (2017 20:00) (92 - 97)  RR: 19 (2017 16:21) (16 - 20)  SpO2: 91% (2017 16:21) (91% - 100%)  CAPILLARY BLOOD GLUCOSE       @ 07:01  -   @ 07:00  --------------------------------------------------------  IN: 945.5 mL / OUT: 515 mL / NET: 430.5 mL      Patient feeling better No CP, No SOB, No N/V    HEENT: PEARLA  NGT  Neck: Supple  Cardio: S1 S2  TRISH Irregular  Pulm: CTA Upper Lower lobes Low course crackles  Abd: Soft NT +Distension BS+, incision clean  Rectal - refused  Ext: No DCT  Skin: No Rash  Neuro: Awake Pleasaant      Malignant neoplasm of descending colon - Pain control await bowel fxn, NGT- for decompression  Renal insufficiency - Nephology following   Microcytic anemia - secondary to age and colon cancer  ILD (interstitial lung disease) - Pulm  Multivalvular Dz - Cardiology  IDDM (insulin dependent diabetes mellitus) -  Lantus tonight and SS  COPD, mild - cont meds Pulm  Atrial fibrillation - Rate controlled  Congestive heart failure - high risk of post op failure, careful I&O monitoring Note Date 17    HPI:  89 yo female, c/o progressively enlarging abd girth over 1 yr, s/p colonoscopy 10/19/17, found to have tubular adenoma at ileocecal valve and 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 Pt denies Nausea/vomiting, diarrhea. C/o constipation. Denies fever/chills.    Pt direct admission for operative intervention by Dr Flowers on .    Pt c/o SOB/dyspnea and mild vague chest discomfort upon admission to the floor, also c/o tender inner left lower leg, also c/o LE edema L > R and coldness of LE's    Pulmonary consult - Dr Bang,    Primary care consult - Dr Barbosa    Cardiology consult - Dr Bush (2017 15:13)     Allergies    Cipro (Unknown)  contrast media (iodine-based) (Unknown)  penicillins (Unknown)  shellfish (Unknown)  Valium (Unknown)    Intolerances      Renal insufficiency  Malignant neoplasm of descending colon  Microcytic anemia  ILD (interstitial lung disease)  Tricuspid valve insufficiency, unspecified etiology  Mitral valve insufficiency, unspecified etiology  Aortic valve insufficiency, etiology of cardiac valve disease unspecified  IDDM (insulin dependent diabetes mellitus)  COPD, mild  Atrial fibrillation, unspecified type  Diabetes  Cardiomegaly  Congestive heart failure, unspecified congestive heart failure chronicity, unspecified congestive heart failure type    MEDICATIONS  (STANDING):  alvimopan 12 milliGRAM(s) Oral two times a day  amylase/lipase/protease  (CREON  6,000 Units) 1 Capsule(s) Oral three times a day with meals  ascorbic acid 500 milliGRAM(s) Oral daily  calcium carbonate 1250 mG + Vitamin D (OsCal 500 + D) 1 Tablet(s) Oral two times a day  dextrose 5%. 1000 milliLiter(s) (50 mL/Hr) IV Continuous <Continuous>  dextrose 50% Injectable 12.5 Gram(s) IV Push once  dextrose 50% Injectable 25 Gram(s) IV Push once  dextrose 50% Injectable 25 Gram(s) IV Push once  diltiazem    milliGRAM(s) Oral daily  heparin  Injectable 5000 Unit(s) SubCutaneous every 8 hours  hydrALAZINE 50 milliGRAM(s) Oral two times a day  insulin lispro (HumaLOG) corrective regimen sliding scale   SubCutaneous every 6 hours  lactated ringers. 1000 milliLiter(s) (50 mL/Hr) IV Continuous <Continuous>  lidocaine   Patch 1 Patch Transdermal daily  methimazole 2.5 milliGRAM(s) Oral <User Schedule>  metoprolol    tartrate Injectable 5 milliGRAM(s) IV Push every 6 hours  mirtazapine 15 milliGRAM(s) Oral at bedtime  tiotropium 18 MICROgram(s) Capsule 1 Capsule(s) Inhalation daily    MEDICATIONS  (PRN):  ALBUTerol    0.083% 2.5 milliGRAM(s) Nebulizer every 6 hours PRN Shortness of Breath  dextrose Gel 1 Dose(s) Oral once PRN Blood Glucose LESS THAN 70 milliGRAM(s)/deciliter  glucagon  Injectable 1 milliGRAM(s) IntraMuscular once PRN Glucose LESS THAN 70 milligrams/deciliter  ondansetron Injectable 4 milliGRAM(s) IV Push every 6 hours PRN Nausea       Vital Signs Last 24 Hrs  T(C): 37.1   HR: 90  BP: 150/74  RR: 18  SpO2: 91%          Patient feeling better No CP, No SOB, No N/V    HEENT: PEARLA  NGT  Neck: Supple  Cardio: S1 S2  TRISH Irregular  Pulm: CTA Upper Lower lobes Low course crackles  Abd: Soft NT +Distension BS dec, incision clean  Rectal - refused  Ext: No DCT  Skin: No Rash  Neuro: Awake Pleaseant      Malignant neoplasm of descending colon - extubated Pain control  Renal insufficiency - Nephology following diuretic   Microcytic anemia - secondary to age and colon cancer  ILD (interstitial lung disease) - Pulm  Multivalvular Dz - Cardiology  IDDM (insulin dependent diabetes mellitus) -  Lantus tonight and SS  COPD, mild - cont meds Pulm  Atrial fibrillation - Rate controlled  Congestive heart failure - high risk of post op failure, careful I&O monitoring      Spoke with  and Granddaughter      Note 17    89 yo female, c/o progressively enlarging abd girth over 1 yr, s/p colonoscopy 10/19/17, found to have tubular adenoma at ileocecal valve and 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 Pt denies Nausea/vomiting, diarrhea. C/o constipation. Denies fever/chills.    Pt direct admission for operative intervention by Dr Flowers on .    Pt c/o SOB/dyspnea and mild vague chest discomfort upon admission to the floor, also c/o tender inner left lower leg, also c/o LE edema L > R and coldness of LE's    Pulmonary consult - Dr Bang,    Primary care consult - Dr Barbosa    Cardiology consult - Dr Bush (2017 15:13)     Allergies    Cipro (Unknown)  contrast media (iodine-based) (Unknown)  penicillins (Unknown)  shellfish (Unknown)  Valium (Unknown)    Intolerances      Renal insufficiency  Malignant neoplasm of descending colon  Microcytic anemia  ILD (interstitial lung disease)  Tricuspid valve insufficiency, unspecified etiology  Mitral valve insufficiency, unspecified etiology  Aortic valve insufficiency, etiology of cardiac valve disease unspecified  IDDM (insulin dependent diabetes mellitus)  COPD, mild  Atrial fibrillation, unspecified type  Diabetes  Cardiomegaly  Congestive heart failure, unspecified congestive heart failure chronicity, unspecified congestive heart failure type    MEDICATIONS  (STANDING):  alvimopan 12 milliGRAM(s) Oral two times a day  amylase/lipase/protease  (CREON  6,000 Units) 1 Capsule(s) Oral three times a day with meals  ascorbic acid 500 milliGRAM(s) Oral daily  calcium carbonate 1250 mG + Vitamin D (OsCal 500 + D) 1 Tablet(s) Oral two times a day  dextrose 5%. 1000 milliLiter(s) (50 mL/Hr) IV Continuous <Continuous>  dextrose 50% Injectable 12.5 Gram(s) IV Push once  dextrose 50% Injectable 25 Gram(s) IV Push once  dextrose 50% Injectable 25 Gram(s) IV Push once  diltiazem    milliGRAM(s) Oral daily  heparin  Injectable 5000 Unit(s) SubCutaneous every 8 hours  hydrALAZINE 50 milliGRAM(s) Oral two times a day  insulin lispro (HumaLOG) corrective regimen sliding scale   SubCutaneous every 6 hours  lactated ringers. 1000 milliLiter(s) (50 mL/Hr) IV Continuous <Continuous>  lidocaine   Patch 1 Patch Transdermal daily  methimazole 2.5 milliGRAM(s) Oral <User Schedule>  metoprolol    tartrate Injectable 5 milliGRAM(s) IV Push every 6 hours  mirtazapine 15 milliGRAM(s) Oral at bedtime  tiotropium 18 MICROgram(s) Capsule 1 Capsule(s) Inhalation daily    MEDICATIONS  (PRN):  ALBUTerol    0.083% 2.5 milliGRAM(s) Nebulizer every 6 hours PRN Shortness of Breath  dextrose Gel 1 Dose(s) Oral once PRN Blood Glucose LESS THAN 70 milliGRAM(s)/deciliter  glucagon  Injectable 1 milliGRAM(s) IntraMuscular once PRN Glucose LESS THAN 70 milligrams/deciliter  ondansetron Injectable 4 milliGRAM(s) IV Push every 6 hours PRN Nausea                           9.4    16.8  )-----------( 451      ( 2017 06:57 )             31.3         142  |  102  |  27.0<H>  ----------------------------<  116<H>  4.2   |  26.0  |  1.20    Ca    9.6      2017 06:57  Phos  3.2       Mg     2.5         TPro  6.4<L>  /  Alb  2.9<L>  /  TBili  0.1<L>  /  DBili  x   /  AST  22  /  ALT  19  /  AlkPhos  89        Urinalysis Basic - ( 2017 22:43 )    Color: Yellow / Appearance: Clear / S.015 / pH: x  Gluc: x / Ketone: Negative  / Bili: Negative / Urobili: Negative mg/dL   Blood: x / Protein: 15 mg/dL / Nitrite: Negative   Leuk Esterase: Trace / RBC: 0-2 /HPF / WBC 0-2   Sq Epi: x / Non Sq Epi: Occasional / Bacteria: Occasional    ;  Vital Signs Last 24 Hrs  T(C): 37.1 (2017 16:21), Max: 37.2 (2017 20:00)  T(F): 98.8 (2017 16:21), Max: 99 (2017 20:00)  HR: 88 (2017 16:21) (71 - 91)  BP: 159/73 (2017 16:21) (130/74 - 159/73)  BP(mean): 97 (2017 20:00) (92 - 97)  RR: 19 (2017 16:21) (16 - 20)  SpO2: 91% (2017 16:21) (91% - 100%)  CAPILLARY BLOOD GLUCOSE       @ 07:01  -   @ 07:00  --------------------------------------------------------  IN: 945.5 mL / OUT: 515 mL / NET: 430.5 mL      Patient feeling better No CP, No SOB, No N/V    HEENT: PEARLA  NGT  Neck: Supple  Cardio: S1 S2  TRISH Irregular  Pulm: CTA Upper Lower lobes Low course crackles  Abd: Soft NT +Distension BS+, incision clean  Rectal - refused  Ext: No DCT  Skin: No Rash  Neuro: Awake Pleasaant      Malignant neoplasm of descending colon - Pain control await bowel fxn, NGT- for decompression  Renal insufficiency - Nephology following   Microcytic anemia - secondary to age and colon cancer  ILD (interstitial lung disease) - Pulm  Multivalvular Dz - Cardiology  IDDM (insulin dependent diabetes mellitus) -  Lantus tonight and SS  COPD, mild - cont meds Pulm  Atrial fibrillation - Rate controlled, will restart coumadin once surgically acceptable  Congestive heart failure - high risk of post op failure, careful I&O monitoring

## 2017-11-29 LAB
ANION GAP SERPL CALC-SCNC: 19 MMOL/L — HIGH (ref 5–17)
BUN SERPL-MCNC: 34 MG/DL — HIGH (ref 8–20)
CALCIUM SERPL-MCNC: 10.2 MG/DL — SIGNIFICANT CHANGE UP (ref 8.6–10.2)
CHLORIDE SERPL-SCNC: 99 MMOL/L — SIGNIFICANT CHANGE UP (ref 98–107)
CO2 SERPL-SCNC: 27 MMOL/L — SIGNIFICANT CHANGE UP (ref 22–29)
CREAT SERPL-MCNC: 1.15 MG/DL — SIGNIFICANT CHANGE UP (ref 0.5–1.3)
GLUCOSE BLDC GLUCOMTR-MCNC: 123 MG/DL — HIGH (ref 70–99)
GLUCOSE BLDC GLUCOMTR-MCNC: 152 MG/DL — HIGH (ref 70–99)
GLUCOSE BLDC GLUCOMTR-MCNC: 155 MG/DL — HIGH (ref 70–99)
GLUCOSE BLDC GLUCOMTR-MCNC: 179 MG/DL — HIGH (ref 70–99)
GLUCOSE BLDC GLUCOMTR-MCNC: 188 MG/DL — HIGH (ref 70–99)
GLUCOSE SERPL-MCNC: 144 MG/DL — HIGH (ref 70–115)
HCT VFR BLD CALC: 34.6 % — LOW (ref 37–47)
HGB BLD-MCNC: 10.6 G/DL — LOW (ref 12–16)
MCHC RBC-ENTMCNC: 22 PG — LOW (ref 27–31)
MCHC RBC-ENTMCNC: 30.6 G/DL — LOW (ref 32–36)
MCV RBC AUTO: 71.8 FL — LOW (ref 81–99)
NT-PROBNP SERPL-SCNC: HIGH PG/ML (ref 0–300)
PLATELET # BLD AUTO: 470 K/UL — HIGH (ref 150–400)
POTASSIUM SERPL-MCNC: 3.7 MMOL/L — SIGNIFICANT CHANGE UP (ref 3.5–5.3)
POTASSIUM SERPL-SCNC: 3.7 MMOL/L — SIGNIFICANT CHANGE UP (ref 3.5–5.3)
RBC # BLD: 4.82 M/UL — SIGNIFICANT CHANGE UP (ref 4.4–5.2)
RBC # FLD: 20.8 % — HIGH (ref 11–15.6)
SODIUM SERPL-SCNC: 145 MMOL/L — SIGNIFICANT CHANGE UP (ref 135–145)
WBC # BLD: 12.6 K/UL — HIGH (ref 4.8–10.8)
WBC # FLD AUTO: 12.6 K/UL — HIGH (ref 4.8–10.8)

## 2017-11-29 PROCEDURE — 74000: CPT | Mod: 26

## 2017-11-29 RX ORDER — ALPRAZOLAM 0.25 MG
0.12 TABLET ORAL
Qty: 0 | Refills: 0 | Status: DISCONTINUED | OUTPATIENT
Start: 2017-11-29 | End: 2017-12-01

## 2017-11-29 RX ORDER — ALBUTEROL 90 UG/1
2.5 AEROSOL, METERED ORAL
Qty: 0 | Refills: 0 | Status: DISCONTINUED | OUTPATIENT
Start: 2017-11-29 | End: 2017-12-06

## 2017-11-29 RX ORDER — MORPHINE SULFATE 50 MG/1
0.5 CAPSULE, EXTENDED RELEASE ORAL EVERY 6 HOURS
Qty: 0 | Refills: 0 | Status: DISCONTINUED | OUTPATIENT
Start: 2017-11-29 | End: 2017-11-29

## 2017-11-29 RX ORDER — METOPROLOL TARTRATE 50 MG
5 TABLET ORAL ONCE
Qty: 0 | Refills: 0 | Status: COMPLETED | OUTPATIENT
Start: 2017-11-29 | End: 2017-11-29

## 2017-11-29 RX ORDER — IPRATROPIUM/ALBUTEROL SULFATE 18-103MCG
3 AEROSOL WITH ADAPTER (GRAM) INHALATION EVERY 6 HOURS
Qty: 0 | Refills: 0 | Status: DISCONTINUED | OUTPATIENT
Start: 2017-11-29 | End: 2017-12-06

## 2017-11-29 RX ORDER — DILTIAZEM HCL 120 MG
360 CAPSULE, EXT RELEASE 24 HR ORAL DAILY
Qty: 0 | Refills: 0 | Status: DISCONTINUED | OUTPATIENT
Start: 2017-11-29 | End: 2017-11-30

## 2017-11-29 RX ORDER — FUROSEMIDE 40 MG
20 TABLET ORAL DAILY
Qty: 0 | Refills: 0 | Status: DISCONTINUED | OUTPATIENT
Start: 2017-11-29 | End: 2017-11-29

## 2017-11-29 RX ORDER — ALBUTEROL 90 UG/1
2.5 AEROSOL, METERED ORAL
Qty: 0 | Refills: 0 | Status: DISCONTINUED | OUTPATIENT
Start: 2017-11-29 | End: 2017-11-29

## 2017-11-29 RX ORDER — MORPHINE SULFATE 50 MG/1
1 CAPSULE, EXTENDED RELEASE ORAL EVERY 6 HOURS
Qty: 0 | Refills: 0 | Status: DISCONTINUED | OUTPATIENT
Start: 2017-11-29 | End: 2017-11-29

## 2017-11-29 RX ADMIN — Medication 50 MILLIGRAM(S): at 06:02

## 2017-11-29 RX ADMIN — Medication 40 MILLIGRAM(S): at 23:06

## 2017-11-29 RX ADMIN — HEPARIN SODIUM 5000 UNIT(S): 5000 INJECTION INTRAVENOUS; SUBCUTANEOUS at 06:03

## 2017-11-29 RX ADMIN — MIRTAZAPINE 15 MILLIGRAM(S): 45 TABLET, ORALLY DISINTEGRATING ORAL at 01:30

## 2017-11-29 RX ADMIN — Medication 1 CAPSULE(S): at 11:08

## 2017-11-29 RX ADMIN — Medication: at 01:29

## 2017-11-29 RX ADMIN — Medication 25 MILLIGRAM(S): at 06:02

## 2017-11-29 RX ADMIN — Medication 360 MILLIGRAM(S): at 17:35

## 2017-11-29 RX ADMIN — Medication: at 06:19

## 2017-11-29 RX ADMIN — Medication 1 CAPSULE(S): at 11:41

## 2017-11-29 RX ADMIN — Medication 0.12 MILLIGRAM(S): at 18:17

## 2017-11-29 RX ADMIN — ALVIMOPAN 12 MILLIGRAM(S): 12 CAPSULE ORAL at 06:02

## 2017-11-29 RX ADMIN — Medication: at 12:30

## 2017-11-29 RX ADMIN — Medication 5 MILLIGRAM(S): at 01:16

## 2017-11-29 RX ADMIN — Medication 500 MILLIGRAM(S): at 11:41

## 2017-11-29 RX ADMIN — Medication 1 CAPSULE(S): at 17:36

## 2017-11-29 RX ADMIN — Medication 2: at 17:34

## 2017-11-29 RX ADMIN — HEPARIN SODIUM 5000 UNIT(S): 5000 INJECTION INTRAVENOUS; SUBCUTANEOUS at 00:30

## 2017-11-29 RX ADMIN — Medication 20 MILLIGRAM(S): at 17:35

## 2017-11-29 RX ADMIN — ALVIMOPAN 12 MILLIGRAM(S): 12 CAPSULE ORAL at 17:36

## 2017-11-29 RX ADMIN — MIRTAZAPINE 15 MILLIGRAM(S): 45 TABLET, ORALLY DISINTEGRATING ORAL at 23:06

## 2017-11-29 RX ADMIN — ALBUTEROL 2.5 MILLIGRAM(S): 90 AEROSOL, METERED ORAL at 21:17

## 2017-11-29 RX ADMIN — Medication 240 MILLIGRAM(S): at 06:02

## 2017-11-29 RX ADMIN — Medication 1 TABLET(S): at 17:35

## 2017-11-29 RX ADMIN — Medication 50 MILLIGRAM(S): at 17:35

## 2017-11-29 RX ADMIN — HEPARIN SODIUM 5000 UNIT(S): 5000 INJECTION INTRAVENOUS; SUBCUTANEOUS at 12:33

## 2017-11-29 RX ADMIN — HEPARIN SODIUM 5000 UNIT(S): 5000 INJECTION INTRAVENOUS; SUBCUTANEOUS at 23:06

## 2017-11-29 NOTE — PROGRESS NOTE ADULT - SUBJECTIVE AND OBJECTIVE BOX
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 Pt denies Nausea/vomiting, diarrhea. C/o constipation. Denies fever/chills.    Pt direct admission for operative intervention by Dr Flowers on 11/20.    Pt c/o SOB/dyspnea and mild vague chest discomfort upon admission to the floor, also c/o tender inner left lower leg, also c/o LE edema L > R and coldness of LE's    Pulmonary consult - Dr Bang,    Primary care consult - Dr Barbosa    Cardiology consult - Dr Bush (16 Nov 2017 15:13)     Allergies    Cipro (Unknown)  contrast media (iodine-based) (Unknown)  penicillins (Unknown)  shellfish (Unknown)  Valium (Unknown)    Intolerances      Renal insufficiency  Malignant neoplasm of descending colon  Microcytic anemia  ILD (interstitial lung disease)  Tricuspid valve insufficiency, unspecified etiology  Mitral valve insufficiency, unspecified etiology  Aortic valve insufficiency, etiology of cardiac valve disease unspecified  IDDM (insulin dependent diabetes mellitus)  COPD, mild  Atrial fibrillation, unspecified type  Diabetes  Cardiomegaly  Congestive heart failure, unspecified congestive heart failure chronicity, unspecified congestive heart failure type    MEDICATIONS  (STANDING):  ALBUTerol/ipratropium for Nebulization 3 milliLiter(s) Nebulizer every 6 hours  alvimopan 12 milliGRAM(s) Oral two times a day  amylase/lipase/protease  (CREON  6,000 Units) 1 Capsule(s) Oral three times a day with meals  ascorbic acid 500 milliGRAM(s) Oral daily  calcium carbonate 1250 mG + Vitamin D (OsCal 500 + D) 1 Tablet(s) Oral two times a day  dextrose 5%. 1000 milliLiter(s) (50 mL/Hr) IV Continuous <Continuous>  dextrose 50% Injectable 12.5 Gram(s) IV Push once  dextrose 50% Injectable 25 Gram(s) IV Push once  dextrose 50% Injectable 25 Gram(s) IV Push once  diltiazem    milliGRAM(s) Oral daily  heparin  Injectable 5000 Unit(s) SubCutaneous every 8 hours  hydrALAZINE 50 milliGRAM(s) Oral two times a day  insulin lispro (HumaLOG) corrective regimen sliding scale   SubCutaneous every 6 hours  lidocaine   Patch 1 Patch Transdermal daily  methimazole 2.5 milliGRAM(s) Oral <User Schedule>  metoprolol     tartrate 25 milliGRAM(s) Oral daily  mirtazapine 15 milliGRAM(s) Oral at bedtime  torsemide 40 milliGRAM(s) Oral before breakfast    MEDICATIONS  (PRN):  ALBUTerol    0.083% 2.5 milliGRAM(s) Nebulizer every 3 hours PRN Bronchospasm  ALPRAZolam 0.125 milliGRAM(s) Oral two times a day PRN anxiety  dextrose Gel 1 Dose(s) Oral once PRN Blood Glucose LESS THAN 70 milliGRAM(s)/deciliter  glucagon  Injectable 1 milliGRAM(s) IntraMuscular once PRN Glucose LESS THAN 70 milligrams/deciliter  ondansetron Injectable 4 milliGRAM(s) IV Push every 6 hours PRN Nausea                           10.6   12.6  )-----------( 470      ( 29 Nov 2017 18:20 )             34.6     11-29    145  |  99  |  34.0<H>  ----------------------------<  144<H>  3.7   |  27.0  |  1.15    Ca    10.2      29 Nov 2017 18:20  Phos  3.2     11-28  Mg     2.5     11-28        ;  Vital Signs Last 24 Hrs  T(C): 36.9 (29 Nov 2017 16:47), Max: 37.2 (28 Nov 2017 23:58)  T(F): 98.5 (29 Nov 2017 16:47), Max: 99 (28 Nov 2017 23:58)  HR: 109 (29 Nov 2017 16:47) (106 - 115)  BP: 138/81 (29 Nov 2017 16:47) (138/81 - 173/78)  BP(mean): --  RR: 20 (29 Nov 2017 16:47) (18 - 20)  SpO2: 96% (29 Nov 2017 16:47) (94% - 98%)  CAPILLARY BLOOD GLUCOSE      11-28 @ 07:01  -  11-29 @ 07:00  --------------------------------------------------------  IN: 220 mL / OUT: 800 mL / NET: -580 mL    11-29 @ 07:01 - 11-29 @ 19:32  --------------------------------------------------------  IN: 600 mL / OUT: 1300 mL / NET: -700 mL      Patient feeling better No CP, mod SOB, No N/V    HEENT: PEARLA  NGT  Neck: Supple +JVD  Cardio: S1 S2  TRISH Irregular  Pulm: CTA Upper Lower lobes Low course crackles with rales  Abd: Soft NT +Distension BS+, incision clean  Rectal - refused  Ext: No DCT  Skin: No Rash  Neuro: Awake Pleasant        Acute Congestive heart failure -continues to be in failure add back torsemide d/c lasix, Fluids dC  COPD - increase neb frequency with cont o2  Moderate Severe- Anxiety Remerone add low dose xanax  agrees (will watch for paradoxical reaction)  Malignant neoplasm of descending colon - Pain control await bowel fxn, NGT- for decompression  Renal insufficiency - Nephology following   Microcytic anemia - secondary to age and colon cancer  ILD (interstitial lung disease) - Pulm  Multivalvular Dz - Cardiology  IDDM (insulin dependent diabetes mellitus) -  Lantus tonight and SS  Atrial fibrillation - Rate controlled, will restart coumadin once surgically acceptable

## 2017-11-29 NOTE — PROGRESS NOTE ADULT - SUBJECTIVE AND OBJECTIVE BOX
NEPHROLOGY INTERVAL HPI/OVERNIGHT EVENTS:    Feels better today  OOB to chair   + Lasix    ml   POD # 3   No SOB or CP today   + BM   Advanced to clears     MEDICATIONS  (STANDING):  alvimopan 12 milliGRAM(s) Oral two times a day  amylase/lipase/protease  (CREON  6,000 Units) 1 Capsule(s) Oral three times a day with meals  ascorbic acid 500 milliGRAM(s) Oral daily  calcium carbonate 1250 mG + Vitamin D (OsCal 500 + D) 1 Tablet(s) Oral two times a day  dextrose 5%. 1000 milliLiter(s) (50 mL/Hr) IV Continuous <Continuous>  dextrose 50% Injectable 12.5 Gram(s) IV Push once  dextrose 50% Injectable 25 Gram(s) IV Push once  dextrose 50% Injectable 25 Gram(s) IV Push once  diltiazem    milliGRAM(s) Oral daily  furosemide    Tablet 20 milliGRAM(s) Oral daily  heparin  Injectable 5000 Unit(s) SubCutaneous every 8 hours  hydrALAZINE 50 milliGRAM(s) Oral two times a day  insulin lispro (HumaLOG) corrective regimen sliding scale   SubCutaneous every 6 hours  lidocaine   Patch 1 Patch Transdermal daily  methimazole 2.5 milliGRAM(s) Oral <User Schedule>  metoprolol     tartrate 25 milliGRAM(s) Oral daily  mirtazapine 15 milliGRAM(s) Oral at bedtime  tiotropium 18 MICROgram(s) Capsule 1 Capsule(s) Inhalation daily    MEDICATIONS  (PRN):  ALBUTerol    0.083% 2.5 milliGRAM(s) Nebulizer every 6 hours PRN Shortness of Breath  dextrose Gel 1 Dose(s) Oral once PRN Blood Glucose LESS THAN 70 milliGRAM(s)/deciliter  glucagon  Injectable 1 milliGRAM(s) IntraMuscular once PRN Glucose LESS THAN 70 milligrams/deciliter  ondansetron Injectable 4 milliGRAM(s) IV Push every 6 hours PRN Nausea      Allergies    Cipro (Unknown)  contrast media (iodine-based) (Unknown)  penicillins (Unknown)  shellfish (Unknown)  Valium (Unknown)    Intolerances            Vital Signs Last 24 Hrs  T(C): 37 (2017 09:13), Max: 37.2 (2017 23:58)  T(F): 98.6 (2017 09:13), Max: 99 (2017 23:58)  HR: 106 (2017 09:13) (88 - 115)  BP: 140/80 (2017 09:13) (139/69 - 173/78)  BP(mean): --  RR: 18 (2017 09:13) (18 - 19)  SpO2: 95% (2017 09:13) (91% - 98%)  Daily     Daily Weight in k.5 (2017 05:33)  I&O's Detail    2017 07:01  -  2017 07:00  --------------------------------------------------------  IN:    Enteral Tube Flush: 60 mL    Enteral Tube Flush: 60 mL    lactated ringers.: 100 mL  Total IN: 220 mL    OUT:    Indwelling Catheter - Urethral: 800 mL  Total OUT: 800 mL    Total NET: -580 mL        I&O's Summary    2017 07:01  -  2017 07:00  --------------------------------------------------------  IN: 220 mL / OUT: 800 mL / NET: -580 mL        PHYSICAL EXAM:  HEAD:  Atraumatic, Normocephalic  EYES: EOMI, PERRLA, conjunctiva and sclera clear  NECK: Supple, No JVD  NERVOUS SYSTEM:  Alert & Oriented X3, intact and symmetric  CHEST/LUNG: + chronic crackles  HEART: Irr no rub  ABDOMEN: Soft, lap wound packed with vac   EXTREMITIES: some edema . + Sloan       LABS:                        9.4    16.8  )-----------( 451      ( 2017 06:57 )             31.3         142  |  102  |  27.0<H>  ----------------------------<  116<H>  4.2   |  26.0  |  1.20    Ca    9.6      2017 06:57  Phos  3.2     11-28  Mg     2.5                 ABG - ( 2017 20:58 )  pH: 7.43  /  pCO2: 41    /  pO2: 74    / HCO3: 27    / Base Excess: 2.5   /  SaO2: 96                    RADIOLOGY & ADDITIONAL TESTS:

## 2017-11-29 NOTE — PROVIDER CONTACT NOTE (OTHER) - ACTION/TREATMENT ORDERED:
Lopressor 5 mg IVP  continue to monitor  1:1 if patient attempts to get out of bed again unassisted   -follow up cardiology recommendations Lopressor 5 mg IVP X1   Diltiazem 10mg IVP X1 with hold parameters   continue to monitor  1:1 if patient attempts to get out of bed again unassisted   -follow up cardiology recommendations

## 2017-11-29 NOTE — PROGRESS NOTE ADULT - ASSESSMENT
CRF, ARF due to pre renal azotemia due to diuretics  H/O COPD/ILD , DM   Colonic neoplasm ---> S/P OR   Afib; CHF    Afib , CHF   Needs to be kept prerenal or develops CHF due to dd   rate control as per Cardio   Repeat ECHO to be done

## 2017-11-29 NOTE — CHART NOTE - NSCHARTNOTEFT_GEN_A_CORE
Source: Patient [ ]  Family [ ]   other [X]: EMR    Current Diet: Consistent CHO, Clear Liquid    Patient reports [ ] nausea  [ ] vomiting [ ] diarrhea [ ] constipation  [ ]chewing problems [ ] swallowing issues  [ ] other:     PO intake:  < 50% [X]   50-75%  [ ]   %  [ ]  other :    Source for PO intake [ ] Patient [ ] family [ ] chart [ ] staff [ ] other    Enteral /Parenteral Nutrition:     Current Weight: 135.5lbs (11-29-17)    % Weight Change     Pertinent Medications: MEDICATIONS  (STANDING):  alvimopan 12 milliGRAM(s) Oral two times a day  amylase/lipase/protease  (CREON  6,000 Units) 1 Capsule(s) Oral three times a day with meals  ascorbic acid 500 milliGRAM(s) Oral daily  calcium carbonate 1250 mG + Vitamin D (OsCal 500 + D) 1 Tablet(s) Oral two times a day  dextrose 5%. 1000 milliLiter(s) (50 mL/Hr) IV Continuous <Continuous>  dextrose 50% Injectable 12.5 Gram(s) IV Push once  dextrose 50% Injectable 25 Gram(s) IV Push once  dextrose 50% Injectable 25 Gram(s) IV Push once  diltiazem    milliGRAM(s) Oral daily  heparin  Injectable 5000 Unit(s) SubCutaneous every 8 hours  hydrALAZINE 50 milliGRAM(s) Oral two times a day  insulin lispro (HumaLOG) corrective regimen sliding scale   SubCutaneous every 6 hours  lidocaine   Patch 1 Patch Transdermal daily  methimazole 2.5 milliGRAM(s) Oral <User Schedule>  metoprolol     tartrate 25 milliGRAM(s) Oral daily  mirtazapine 15 milliGRAM(s) Oral at bedtime  tiotropium 18 MICROgram(s) Capsule 1 Capsule(s) Inhalation daily    MEDICATIONS  (PRN):  ALBUTerol    0.083% 2.5 milliGRAM(s) Nebulizer every 6 hours PRN Shortness of Breath  dextrose Gel 1 Dose(s) Oral once PRN Blood Glucose LESS THAN 70 milliGRAM(s)/deciliter  glucagon  Injectable 1 milliGRAM(s) IntraMuscular once PRN Glucose LESS THAN 70 milligrams/deciliter  ondansetron Injectable 4 milliGRAM(s) IV Push every 6 hours PRN Nausea    Pertinent Labs: CBC Full  -  ( 28 Nov 2017 06:57 )  WBC Count : 16.8 K/uL  Hemoglobin : 9.4 g/dL  Hematocrit : 31.3 %  Platelet Count - Automated : 451 K/uL  Mean Cell Volume : 72.1 fl  Mean Cell Hemoglobin : 21.7 pg  Mean Cell Hemoglobin Concentration : 30.0 g/dL  Auto Neutrophil # : x  Auto Lymphocyte # : x  Auto Monocyte # : x  Auto Eosinophil # : x  Auto Basophil # : x  Auto Neutrophil % : 83.0 %  Auto Lymphocyte % : 11.0 %  Auto Monocyte % : 6.0 %  Auto Eosinophil % : x  Auto Basophil % : x    Skin: Intact    Nutrition focused physical exam conducted - found signs of malnutrition [X]absent [ ]present    Subcutaneous fat loss: [ ] Orbital fat pads region, [ ]Buccal fat region, [ ]Triceps region,  [ ]Ribs region    Muscle wasting: [ ]Temples region, [ ]Clavicle region, [ ]Shoulder region, [ ]Scapula region, [ ]Interosseous region,  [ ]thigh region, [ ]Calf region    Estimated Needs:   [X] no change since previous assessment  [ ] recalculated:     Current Nutrition Diagnosis: Ongoing dx of inadequate oral intake continues. Pt continues with AMS. POD #3 Laparoscopic assisted sigmoid resection with end to side anastomosis, mobilization of splenic flexure, Lysis of Adhesions, Rigid proctosigmoidoscopy. Per chart, pt with abnormal bowel function.      Recommendations:   1) Advance to regular, consistent CHO diet when medically feasible  2) Provide feeding assistance     Monitoring and Evaluation:   [X] PO intake [X] Tolerance to diet prescription [X] Weights  [X] Follow up per protocol [X] Labs:

## 2017-11-29 NOTE — PROVIDER CONTACT NOTE (OTHER) - ASSESSMENT
Pt seen and examined at bedside, NAD speaking about "seeing her brother". Patient speaking in full sentences denies CP, SOB, N,V. Cards s1s2, tachycardic, lungs good air movement b/l crackles at bases b/l improved from previous encounter. ABd mod distended, +BS. Lopressor 5 mg IVP administered at bedside with appropriate response. Pt seen and examined at bedside, NAD speaking about "seeing her brother". Patient speaking in full sentences denies CP, SOB, N,V. Cards s1s2, tachycardic, lungs good air movement b/l crackles at bases b/l improved from previous encounter. ABd mod distended, +BS. Lopressor 5 mg IVP administered at bedside however heart rate still elevated 110. Diltiazam 10 mg IVP given at bedside.

## 2017-11-29 NOTE — PROGRESS NOTE ADULT - SUBJECTIVE AND OBJECTIVE BOX
INTERVAL HPI/OVERNIGHT EVENTS: Patient with tachycardia and episodes of agitation over night.  Diuresed with appropriate response.  Responded to beta blockade temporarily.  Presently resting comfortably.     STATUS POST: Sigmoid resection end to side    POST OPERATIVE DAY #: 3    SUBJECTIVE:  Flatus: [x ] YES [ ] NO             Bowel Movement: [ ] YES [x ] NO  Pain Control Adequate: [ x] YES [ ] NO  Nausea: [ ] YES [x ] NO            Vomiting: [ ] YES [x ] NO  Diarrhea: [ ] YES [x ] NO         Constipation: [ ] YES [x ] NO     Chest Pain: [ ] YES [x ] NO    SOB:  [ ] YES [x ] NO    MEDICATIONS  (STANDING):  alvimopan 12 milliGRAM(s) Oral two times a day  amylase/lipase/protease  (CREON  6,000 Units) 1 Capsule(s) Oral three times a day with meals  ascorbic acid 500 milliGRAM(s) Oral daily  calcium carbonate 1250 mG + Vitamin D (OsCal 500 + D) 1 Tablet(s) Oral two times a day  dextrose 5%. 1000 milliLiter(s) (50 mL/Hr) IV Continuous <Continuous>  dextrose 50% Injectable 12.5 Gram(s) IV Push once  dextrose 50% Injectable 25 Gram(s) IV Push once  dextrose 50% Injectable 25 Gram(s) IV Push once  diltiazem    milliGRAM(s) Oral daily  heparin  Injectable 5000 Unit(s) SubCutaneous every 8 hours  hydrALAZINE 50 milliGRAM(s) Oral two times a day  insulin lispro (HumaLOG) corrective regimen sliding scale   SubCutaneous every 6 hours  lidocaine   Patch 1 Patch Transdermal daily  methimazole 2.5 milliGRAM(s) Oral <User Schedule>  metoprolol     tartrate 25 milliGRAM(s) Oral daily  mirtazapine 15 milliGRAM(s) Oral at bedtime  tiotropium 18 MICROgram(s) Capsule 1 Capsule(s) Inhalation daily    MEDICATIONS  (PRN):  ALBUTerol    0.083% 2.5 milliGRAM(s) Nebulizer every 6 hours PRN Shortness of Breath  dextrose Gel 1 Dose(s) Oral once PRN Blood Glucose LESS THAN 70 milliGRAM(s)/deciliter  glucagon  Injectable 1 milliGRAM(s) IntraMuscular once PRN Glucose LESS THAN 70 milligrams/deciliter  ondansetron Injectable 4 milliGRAM(s) IV Push every 6 hours PRN Nausea      Vital Signs Last 24 Hrs  T(C): 37.1 (29 Nov 2017 04:43), Max: 37.2 (28 Nov 2017 23:58)  T(F): 98.7 (29 Nov 2017 04:43), Max: 99 (28 Nov 2017 23:58)  HR: 112 (29 Nov 2017 04:43) (88 - 115)  BP: 153/86 (29 Nov 2017 04:43) (139/69 - 173/78)  BP(mean): --  RR: 18 (29 Nov 2017 04:43) (18 - 19)  SpO2: 94% (29 Nov 2017 04:43) (91% - 98%)    PHYSICAL EXAM:      Constitutional: Alert, oriented x2     Respiratory: Crackles at bases b/l, appropriate air exchange    Cardiovascular: Mild tachycardia, rhythm controlled     Gastrointestinal: Abdomen softly distended, [place] in place with good seal    Genitourinary: u/o adequate, thornton in place     Extremities: Non edematous       I&O's Detail    28 Nov 2017 07:01  -  29 Nov 2017 07:00  --------------------------------------------------------  IN:    Enteral Tube Flush: 60 mL    Enteral Tube Flush: 60 mL    lactated ringers.: 100 mL  Total IN: 220 mL    OUT:    Indwelling Catheter - Urethral: 800 mL  Total OUT: 800 mL    Total NET: -580 mL          LABS:                        9.4    16.8  )-----------( 451      ( 28 Nov 2017 06:57 )             31.3     11-28    142  |  102  |  27.0<H>  ----------------------------<  116<H>  4.2   |  26.0  |  1.20    Ca    9.6      28 Nov 2017 06:57  Phos  3.2     11-28  Mg     2.5     11-28            RADIOLOGY & ADDITIONAL STUDIES:

## 2017-11-29 NOTE — PROGRESS NOTE ADULT - SUBJECTIVE AND OBJECTIVE BOX
[facility]                                   [address] [place] [address]                                                      PHONE: [phone]                                                         FAX: [phone]  ----------------------------------------------------------------------------------------------------    Pt seen and examined. FU for AF    Overnight events/Complaints: Pt without complains. Ambulating. Breathing better. Tolerating po diet.    Vital Signs Last 24 Hrs  T(C): 37 (29 Nov 2017 09:13), Max: 37.2 (28 Nov 2017 23:58)  T(F): 98.6 (29 Nov 2017 09:13), Max: 99 (28 Nov 2017 23:58)  HR: 106 (29 Nov 2017 09:13) (88 - 115)  BP: 140/80 (29 Nov 2017 09:13) (139/69 - 173/78)  BP(mean): --  RR: 18 (29 Nov 2017 09:13) (18 - 19)  SpO2: 95% (29 Nov 2017 09:13) (91% - 98%)  I&O's Summary    28 Nov 2017 07:01  -  29 Nov 2017 07:00  --------------------------------------------------------  IN: 220 mL / OUT: 800 mL / NET: -580 mL        PHYSICAL EXAM:  Constitutional: Oriented to time, place and person. Appears well developed, well nourished; alert and co-operative  HEENT:     Conjunctiva normal, Normal oral mucosa, No JVD	  Cardiovascular: S1, S2 irregular  Respiratory: Lungs clear to auscultation; no crepitations, no wheeze  Gastrointestinal:  Soft, Non-tender, + BS	  Extremities: No cyanosis, clubbing or edema  Skin: Warm and dry  Neurologic: Alert oriented to time place and person  Psychiatric: affect was normal        LABS:                        9.4    16.8  )-----------( 451      ( 28 Nov 2017 06:57 )             31.3     11-28    142  |  102  |  27.0<H>  ----------------------------<  116<H>  4.2   |  26.0  |  1.20    Ca    9.6      28 Nov 2017 06:57  Phos  3.2     11-28  Mg     2.5     11-28      CARDIAC MARKERS ( 28 Nov 2017 21:40 )  x     / 0.03 ng/mL / x     / x     / x              RADIOLOGY & ADDITIONAL STUDIES:    CARDIOLOGY TESTING:     Telemetry: AF rate controlled    MEDICATIONS:  MEDICATIONS  (STANDING):  alvimopan 12 milliGRAM(s) Oral two times a day  amylase/lipase/protease  (CREON  6,000 Units) 1 Capsule(s) Oral three times a day with meals  ascorbic acid 500 milliGRAM(s) Oral daily  calcium carbonate 1250 mG + Vitamin D (OsCal 500 + D) 1 Tablet(s) Oral two times a day  dextrose 5%. 1000 milliLiter(s) (50 mL/Hr) IV Continuous <Continuous>  dextrose 50% Injectable 12.5 Gram(s) IV Push once  dextrose 50% Injectable 25 Gram(s) IV Push once  dextrose 50% Injectable 25 Gram(s) IV Push once  diltiazem    milliGRAM(s) Oral daily  heparin  Injectable 5000 Unit(s) SubCutaneous every 8 hours  hydrALAZINE 50 milliGRAM(s) Oral two times a day  insulin lispro (HumaLOG) corrective regimen sliding scale   SubCutaneous every 6 hours  lidocaine   Patch 1 Patch Transdermal daily  methimazole 2.5 milliGRAM(s) Oral <User Schedule>  metoprolol     tartrate 25 milliGRAM(s) Oral daily  mirtazapine 15 milliGRAM(s) Oral at bedtime  tiotropium 18 MICROgram(s) Capsule 1 Capsule(s) Inhalation daily    MEDICATIONS  (PRN):  ALBUTerol    0.083% 2.5 milliGRAM(s) Nebulizer every 6 hours PRN Shortness of Breath  dextrose Gel 1 Dose(s) Oral once PRN Blood Glucose LESS THAN 70 milliGRAM(s)/deciliter  glucagon  Injectable 1 milliGRAM(s) IntraMuscular once PRN Glucose LESS THAN 70 milligrams/deciliter  ondansetron Injectable 4 milliGRAM(s) IV Push every 6 hours PRN Nausea      ASSESSMENT AND PLAN:    89y Female with past medical history significant for atrial fibrillation, ILD, DM, HFpEF who presents with c/o progressively enlarging abd girth over 1 yr, s/p colonoscopy 10/19/17, found to have tubular adenoma at ileocecal valve and 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 Developed fluid overload with AF and RVR that improved with diuretics. Now back to baseline.     -  Continue diltiazem. Will increase to 360 mg daily and continue metoprolol  -  Check Echo to assess LV function  -  Monitor I/O closely. Add lasix 20 mg daily for 1-2 days

## 2017-11-29 NOTE — PROGRESS NOTE ADULT - ASSESSMENT
89F s/p above, complications with CHF exacerbation  1. Monitor u/o  2. Continue diuresis as needed   3. Continue current rate control  4. NGT to suction  5. Will d/w attending

## 2017-11-30 LAB
ACANTHOCYTES BLD QL SMEAR: SLIGHT — SIGNIFICANT CHANGE UP
ALBUMIN SERPL ELPH-MCNC: 3.2 G/DL — LOW (ref 3.3–5.2)
ALP SERPL-CCNC: 81 U/L — SIGNIFICANT CHANGE UP (ref 40–120)
ALT FLD-CCNC: 20 U/L — SIGNIFICANT CHANGE UP
ANION GAP SERPL CALC-SCNC: 15 MMOL/L — SIGNIFICANT CHANGE UP (ref 5–17)
ANION GAP SERPL CALC-SCNC: 17 MMOL/L — SIGNIFICANT CHANGE UP (ref 5–17)
ANISOCYTOSIS BLD QL: SLIGHT — SIGNIFICANT CHANGE UP
APTT BLD: 29.4 SEC — SIGNIFICANT CHANGE UP (ref 27.5–37.4)
AST SERPL-CCNC: 22 U/L — SIGNIFICANT CHANGE UP
BILIRUB SERPL-MCNC: 0.5 MG/DL — SIGNIFICANT CHANGE UP (ref 0.4–2)
BLOOD GAS COMMENTS ARTERIAL: SIGNIFICANT CHANGE UP
BLOOD GAS COMMENTS ARTERIAL: SIGNIFICANT CHANGE UP
BUN SERPL-MCNC: 30 MG/DL — HIGH (ref 8–20)
BUN SERPL-MCNC: 34 MG/DL — HIGH (ref 8–20)
BURR CELLS BLD QL SMEAR: PRESENT — SIGNIFICANT CHANGE UP
CALCIUM SERPL-MCNC: 9.1 MG/DL — SIGNIFICANT CHANGE UP (ref 8.6–10.2)
CALCIUM SERPL-MCNC: 9.5 MG/DL — SIGNIFICANT CHANGE UP (ref 8.6–10.2)
CHLORIDE SERPL-SCNC: 99 MMOL/L — SIGNIFICANT CHANGE UP (ref 98–107)
CHLORIDE SERPL-SCNC: 99 MMOL/L — SIGNIFICANT CHANGE UP (ref 98–107)
CO2 SERPL-SCNC: 26 MMOL/L — SIGNIFICANT CHANGE UP (ref 22–29)
CO2 SERPL-SCNC: 28 MMOL/L — SIGNIFICANT CHANGE UP (ref 22–29)
CREAT SERPL-MCNC: 1.21 MG/DL — SIGNIFICANT CHANGE UP (ref 0.5–1.3)
CREAT SERPL-MCNC: 1.27 MG/DL — SIGNIFICANT CHANGE UP (ref 0.5–1.3)
DACRYOCYTES BLD QL SMEAR: SLIGHT — SIGNIFICANT CHANGE UP
ELLIPTOCYTES BLD QL SMEAR: SLIGHT — SIGNIFICANT CHANGE UP
GAS PNL BLDA: SIGNIFICANT CHANGE UP
GAS PNL BLDA: SIGNIFICANT CHANGE UP
GLUCOSE BLDC GLUCOMTR-MCNC: 172 MG/DL — HIGH (ref 70–99)
GLUCOSE BLDC GLUCOMTR-MCNC: 227 MG/DL — HIGH (ref 70–99)
GLUCOSE BLDC GLUCOMTR-MCNC: 299 MG/DL — HIGH (ref 70–99)
GLUCOSE SERPL-MCNC: 126 MG/DL — HIGH (ref 70–115)
GLUCOSE SERPL-MCNC: 188 MG/DL — HIGH (ref 70–115)
HCO3 BLDA-SCNC: 30 MMOL/L — HIGH (ref 20–26)
HCO3 BLDA-SCNC: 30 MMOL/L — HIGH (ref 20–26)
HCT VFR BLD CALC: 31.2 % — LOW (ref 37–47)
HCT VFR BLD CALC: 32 % — LOW (ref 37–47)
HGB BLD-MCNC: 9.7 G/DL — LOW (ref 12–16)
HGB BLD-MCNC: 9.8 G/DL — LOW (ref 12–16)
HOROWITZ INDEX BLDA+IHG-RTO: SIGNIFICANT CHANGE UP
HOROWITZ INDEX BLDA+IHG-RTO: SIGNIFICANT CHANGE UP
HYPOCHROMIA BLD QL: SLIGHT — SIGNIFICANT CHANGE UP
INR BLD: 1.05 RATIO — SIGNIFICANT CHANGE UP (ref 0.88–1.16)
LYMPHOCYTES # BLD AUTO: 18 % — LOW (ref 20–55)
MACROCYTES BLD QL: SLIGHT — SIGNIFICANT CHANGE UP
MAGNESIUM SERPL-MCNC: 1.8 MG/DL — SIGNIFICANT CHANGE UP (ref 1.6–2.6)
MCHC RBC-ENTMCNC: 21.7 PG — LOW (ref 27–31)
MCHC RBC-ENTMCNC: 21.9 PG — LOW (ref 27–31)
MCHC RBC-ENTMCNC: 30.3 G/DL — LOW (ref 32–36)
MCHC RBC-ENTMCNC: 31.4 G/DL — LOW (ref 32–36)
MCV RBC AUTO: 69.8 FL — LOW (ref 81–99)
MCV RBC AUTO: 71.6 FL — LOW (ref 81–99)
MICROCYTES BLD QL: SLIGHT — SIGNIFICANT CHANGE UP
MONOCYTES NFR BLD AUTO: 7 % — SIGNIFICANT CHANGE UP (ref 3–10)
NEUTROPHILS NFR BLD AUTO: 75 % — HIGH (ref 37–73)
OVALOCYTES BLD QL SMEAR: SLIGHT — SIGNIFICANT CHANGE UP
PCO2 BLDA: 31 MMHG — LOW (ref 35–45)
PCO2 BLDA: 32 MMHG — LOW (ref 35–45)
PH BLDA: 7.57 — HIGH (ref 7.35–7.45)
PH BLDA: 7.57 — HIGH (ref 7.35–7.45)
PHOSPHATE SERPL-MCNC: 2.6 MG/DL — SIGNIFICANT CHANGE UP (ref 2.4–4.7)
PLAT MORPH BLD: NORMAL — SIGNIFICANT CHANGE UP
PLATELET # BLD AUTO: 446 K/UL — HIGH (ref 150–400)
PLATELET # BLD AUTO: 452 K/UL — HIGH (ref 150–400)
PO2 BLDA: 62 MMHG — LOW (ref 83–108)
PO2 BLDA: 62 MMHG — LOW (ref 83–108)
POIKILOCYTOSIS BLD QL AUTO: SLIGHT — SIGNIFICANT CHANGE UP
POLYCHROMASIA BLD QL SMEAR: SLIGHT — SIGNIFICANT CHANGE UP
POTASSIUM SERPL-MCNC: 2.9 MMOL/L — CRITICAL LOW (ref 3.5–5.3)
POTASSIUM SERPL-MCNC: 3.2 MMOL/L — LOW (ref 3.5–5.3)
POTASSIUM SERPL-SCNC: 2.9 MMOL/L — CRITICAL LOW (ref 3.5–5.3)
POTASSIUM SERPL-SCNC: 3.2 MMOL/L — LOW (ref 3.5–5.3)
PROT SERPL-MCNC: 6.5 G/DL — LOW (ref 6.6–8.7)
PROTHROM AB SERPL-ACNC: 11.6 SEC — SIGNIFICANT CHANGE UP (ref 9.8–12.7)
RBC # BLD: 4.47 M/UL — SIGNIFICANT CHANGE UP (ref 4.4–5.2)
RBC # BLD: 4.47 M/UL — SIGNIFICANT CHANGE UP (ref 4.4–5.2)
RBC # FLD: 20.7 % — HIGH (ref 11–15.6)
RBC # FLD: 20.8 % — HIGH (ref 11–15.6)
RBC BLD AUTO: ABNORMAL
SAO2 % BLDA: 94 % — LOW (ref 95–99)
SAO2 % BLDA: 94 % — LOW (ref 95–99)
SCHISTOCYTES BLD QL AUTO: SLIGHT — SIGNIFICANT CHANGE UP
SODIUM SERPL-SCNC: 142 MMOL/L — SIGNIFICANT CHANGE UP (ref 135–145)
SODIUM SERPL-SCNC: 142 MMOL/L — SIGNIFICANT CHANGE UP (ref 135–145)
TARGETS BLD QL SMEAR: SLIGHT — SIGNIFICANT CHANGE UP
WBC # BLD: 10 K/UL — SIGNIFICANT CHANGE UP (ref 4.8–10.8)
WBC # BLD: 9.8 K/UL — SIGNIFICANT CHANGE UP (ref 4.8–10.8)
WBC # FLD AUTO: 10 K/UL — SIGNIFICANT CHANGE UP (ref 4.8–10.8)
WBC # FLD AUTO: 9.8 K/UL — SIGNIFICANT CHANGE UP (ref 4.8–10.8)

## 2017-11-30 PROCEDURE — 70450 CT HEAD/BRAIN W/O DYE: CPT | Mod: 26

## 2017-11-30 PROCEDURE — 93010 ELECTROCARDIOGRAM REPORT: CPT

## 2017-11-30 RX ORDER — METOPROLOL TARTRATE 50 MG
5 TABLET ORAL EVERY 6 HOURS
Qty: 0 | Refills: 0 | Status: DISCONTINUED | OUTPATIENT
Start: 2017-11-30 | End: 2017-12-01

## 2017-11-30 RX ORDER — WARFARIN SODIUM 2.5 MG/1
5 TABLET ORAL ONCE
Qty: 0 | Refills: 0 | Status: DISCONTINUED | OUTPATIENT
Start: 2017-11-30 | End: 2017-11-30

## 2017-11-30 RX ORDER — POTASSIUM CHLORIDE 20 MEQ
10 PACKET (EA) ORAL
Qty: 0 | Refills: 0 | Status: COMPLETED | OUTPATIENT
Start: 2017-11-30 | End: 2017-11-30

## 2017-11-30 RX ORDER — MAGNESIUM SULFATE 500 MG/ML
1 VIAL (ML) INJECTION ONCE
Qty: 0 | Refills: 0 | Status: COMPLETED | OUTPATIENT
Start: 2017-11-30 | End: 2017-11-30

## 2017-11-30 RX ORDER — HEPARIN SODIUM 5000 [USP'U]/ML
INJECTION INTRAVENOUS; SUBCUTANEOUS
Qty: 25000 | Refills: 0 | Status: DISCONTINUED | OUTPATIENT
Start: 2017-11-30 | End: 2017-12-04

## 2017-11-30 RX ORDER — POTASSIUM CHLORIDE 20 MEQ
40 PACKET (EA) ORAL
Qty: 0 | Refills: 0 | Status: DISCONTINUED | OUTPATIENT
Start: 2017-11-30 | End: 2017-11-30

## 2017-11-30 RX ORDER — WARFARIN SODIUM 2.5 MG/1
5 TABLET ORAL DAILY
Qty: 0 | Refills: 0 | Status: DISCONTINUED | OUTPATIENT
Start: 2017-11-30 | End: 2017-11-30

## 2017-11-30 RX ADMIN — Medication 3 MILLILITER(S): at 20:56

## 2017-11-30 RX ADMIN — Medication 3 MILLILITER(S): at 16:38

## 2017-11-30 RX ADMIN — Medication 1 CAPSULE(S): at 12:39

## 2017-11-30 RX ADMIN — ALVIMOPAN 12 MILLIGRAM(S): 12 CAPSULE ORAL at 16:53

## 2017-11-30 RX ADMIN — Medication 50 MILLIGRAM(S): at 05:36

## 2017-11-30 RX ADMIN — Medication 1 CAPSULE(S): at 16:43

## 2017-11-30 RX ADMIN — Medication 2: at 23:47

## 2017-11-30 RX ADMIN — HEPARIN SODIUM 700 UNIT(S)/HR: 5000 INJECTION INTRAVENOUS; SUBCUTANEOUS at 21:46

## 2017-11-30 RX ADMIN — Medication 25 MILLIGRAM(S): at 05:36

## 2017-11-30 RX ADMIN — Medication 1 TABLET(S): at 05:36

## 2017-11-30 RX ADMIN — Medication 40 MILLIGRAM(S): at 16:44

## 2017-11-30 RX ADMIN — Medication 100 MILLIEQUIVALENT(S): at 21:48

## 2017-11-30 RX ADMIN — Medication 6: at 16:42

## 2017-11-30 RX ADMIN — Medication 1 TABLET(S): at 16:52

## 2017-11-30 RX ADMIN — ALVIMOPAN 12 MILLIGRAM(S): 12 CAPSULE ORAL at 05:36

## 2017-11-30 RX ADMIN — HEPARIN SODIUM 5000 UNIT(S): 5000 INJECTION INTRAVENOUS; SUBCUTANEOUS at 16:43

## 2017-11-30 RX ADMIN — Medication 500 MILLIGRAM(S): at 16:44

## 2017-11-30 RX ADMIN — Medication 50 MILLIGRAM(S): at 16:44

## 2017-11-30 RX ADMIN — Medication 100 GRAM(S): at 16:50

## 2017-11-30 RX ADMIN — Medication 360 MILLIGRAM(S): at 05:36

## 2017-11-30 RX ADMIN — Medication 100 MILLIEQUIVALENT(S): at 23:03

## 2017-11-30 RX ADMIN — Medication 100 MILLIEQUIVALENT(S): at 20:41

## 2017-11-30 RX ADMIN — HEPARIN SODIUM 5000 UNIT(S): 5000 INJECTION INTRAVENOUS; SUBCUTANEOUS at 05:36

## 2017-11-30 RX ADMIN — Medication 1 CAPSULE(S): at 08:39

## 2017-11-30 NOTE — PROGRESS NOTE ADULT - ASSESSMENT
1. 89 yo F sp colon resection for adenoma and near total obstruction. Starting oral feeds.  2. chronic afib-will need resumption of AC when ok'd by surgery.  3. Renal insufficiency  4. anemia  5. thyroid disease.

## 2017-11-30 NOTE — PROGRESS NOTE ADULT - SUBJECTIVE AND OBJECTIVE BOX
NEPHROLOGY INTERVAL HPI/OVERNIGHT EVENTS:    Feels the same   cardio follow up noted   Attempting to ambulate with PT   Surgery follow up noted     MEDICATIONS  (STANDING):  ALBUTerol/ipratropium for Nebulization 3 milliLiter(s) Nebulizer every 6 hours  alvimopan 12 milliGRAM(s) Oral two times a day  amylase/lipase/protease  (CREON  6,000 Units) 1 Capsule(s) Oral three times a day with meals  ascorbic acid 500 milliGRAM(s) Oral daily  calcium carbonate 1250 mG + Vitamin D (OsCal 500 + D) 1 Tablet(s) Oral two times a day  dextrose 5%. 1000 milliLiter(s) (50 mL/Hr) IV Continuous <Continuous>  dextrose 50% Injectable 12.5 Gram(s) IV Push once  dextrose 50% Injectable 25 Gram(s) IV Push once  dextrose 50% Injectable 25 Gram(s) IV Push once  diltiazem    milliGRAM(s) Oral daily  heparin  Injectable 5000 Unit(s) SubCutaneous every 8 hours  hydrALAZINE 50 milliGRAM(s) Oral two times a day  insulin lispro (HumaLOG) corrective regimen sliding scale   SubCutaneous every 6 hours  lidocaine   Patch 1 Patch Transdermal daily  methimazole 2.5 milliGRAM(s) Oral <User Schedule>  metoprolol     tartrate 25 milliGRAM(s) Oral daily  mirtazapine 15 milliGRAM(s) Oral at bedtime  potassium chloride   Powder 40 milliEquivalent(s) Oral two times a day  torsemide 40 milliGRAM(s) Oral before breakfast  warfarin 5 milliGRAM(s) Oral daily    MEDICATIONS  (PRN):  ALBUTerol    0.083% 2.5 milliGRAM(s) Nebulizer every 3 hours PRN Bronchospasm  ALPRAZolam 0.125 milliGRAM(s) Oral two times a day PRN anxiety  dextrose Gel 1 Dose(s) Oral once PRN Blood Glucose LESS THAN 70 milliGRAM(s)/deciliter  glucagon  Injectable 1 milliGRAM(s) IntraMuscular once PRN Glucose LESS THAN 70 milligrams/deciliter  ondansetron Injectable 4 milliGRAM(s) IV Push every 6 hours PRN Nausea      Allergies    Cipro (Unknown)  contrast media (iodine-based) (Unknown)  penicillins (Unknown)  shellfish (Unknown)  Valium (Unknown)    Intolerances        Vital Signs Last 24 Hrs  T(C): 36.9 (30 Nov 2017 16:34), Max: 37.3 (30 Nov 2017 04:20)  T(F): 98.5 (30 Nov 2017 16:34), Max: 99.2 (30 Nov 2017 04:20)  HR: 84 (30 Nov 2017 16:34) (78 - 104)  BP: 134/70 (30 Nov 2017 16:34) (125/63 - 151/78)  BP(mean): --  RR: 18 (30 Nov 2017 16:34) (17 - 20)  SpO2: 97% (30 Nov 2017 16:34) (94% - 97%)  Daily     Daily   I&O's Detail    29 Nov 2017 07:01  -  30 Nov 2017 07:00  --------------------------------------------------------  IN:    Oral Fluid: 600 mL  Total IN: 600 mL    OUT:    Indwelling Catheter - Urethral: 2275 mL  Total OUT: 2275 mL    Total NET: -1675 mL      30 Nov 2017 07:01  -  30 Nov 2017 16:56  --------------------------------------------------------  IN:  Total IN: 0 mL    OUT:    Indwelling Catheter - Urethral: 620 mL  Total OUT: 620 mL    Total NET: -620 mL        I&O's Summary    29 Nov 2017 07:01  -  30 Nov 2017 07:00  --------------------------------------------------------  IN: 600 mL / OUT: 2275 mL / NET: -1675 mL    30 Nov 2017 07:01  -  30 Nov 2017 16:56  --------------------------------------------------------  IN: 0 mL / OUT: 620 mL / NET: -620 mL        PHYSICAL EXAM:  NECK: Supple, No JVD  NERVOUS SYSTEM:  Alert & Oriented X3, intact and symmetric  CHEST/LUNG: + chronic crackles  HEART: Irr no rub  ABDOMEN: Soft, lap wound packed with vac   EXTREMITIES: some edema . + Sloan     LABS:                        9.7    10.0  )-----------( 446      ( 30 Nov 2017 06:51 )             32.0     11-30    142  |  99  |  30.0<H>  ----------------------------<  126<H>  3.2<L>   |  26.0  |  1.21    Ca    9.5      30 Nov 2017 06:51  Phos  2.6     11-30  Mg     1.8     11-30          Magnesium, Serum: 1.8 mg/dL (11-30 @ 06:51)  Phosphorus Level, Serum: 2.6 mg/dL (11-30 @ 06:51)    ABG - ( 28 Nov 2017 20:58 )  pH: 7.43  /  pCO2: 41    /  pO2: 74    / HCO3: 27    / Base Excess: 2.5   /  SaO2: 96        ECHO :   Summary:   1. Left ventricular ejection fraction, byvisual estimation, is 45 to   50%.   2. Mildly decreased global left ventricular systolic function.   3. Entire septum and apex are abnormal as described above.   4. Moderately increased LV wall thickness.   5. Normal left ventricular internal cavitysize.   6. The mitral in-flow pattern reveals no discernable A-wave, therefore   no comment on diastolic function can be made.   7. There is moderate concentric left ventricular hypertrophy.   8. Severely dilated right atrium.   9. There is no evidence of pericardial effusion.  10. Mild mitral valve regurgitation.  11. Mild to moderate mitral annular calcification.  12. Thickening and calcification of the anterior and posterior mitral   valve leaflets.  13. Moderate tricuspid regurgitation.  14. Mild to moderate aortic regurgitation.  15. Mild to moderate aortic valve stenosis.  16. Estimated pulmonary artery systolic pressure is 36.2 mmHg assuming a   right atrial pressure of 8 mmHg, which is consistent with borderline   pulmonary hypertension.  17. Mildly dilated pulmonary artery.  18. Peak transaortic gradient equals 18.3 mmHg, mean transaortic gradient   equals 9.5 mmHg, the calculated aortic valve area equals 1.24 cm² by the   continuity equation consistent with moderate aortic stenosis.  19. Severely enlarged left atrium.                  RADIOLOGY & ADDITIONAL TESTS:

## 2017-11-30 NOTE — PROGRESS NOTE ADULT - ASSESSMENT
89F s/p above, COPD, CHF, Afib and renal insufficiency  1. Appreciate cardiology, nephro, medicine input  2. Continue Q2 I&O  3. Consider advance diet  4. OOB to chair, ambulate with PT  5. f/u echo  6. Will d/w attending

## 2017-11-30 NOTE — PROGRESS NOTE ADULT - ASSESSMENT
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     CRF, ARF due to pre renal azotemia due to diuretics  H/O COPD/ILD , DM   Colonic neoplasm ---> S/P OR   Afib; CHF    Needs to be kept prerenal or develops CHF due to dd   rate control as per Cardio   Repeat ECHO noted     potassium supplement noted   Check labs in am

## 2017-11-30 NOTE — PROGRESS NOTE ADULT - SUBJECTIVE AND OBJECTIVE BOX
Chief Complaint: sp colon resection. Recent course and chart reviewed.    HPI: Pt just starting oral fluids. Afebrile and alert w/o specific complaint. Meds reviewed.    PAST MEDICAL & SURGICAL HISTORY:  Renal insufficiency  Malignant neoplasm of descending colon  Microcytic anemia  ILD (interstitial lung disease)  Tricuspid valve insufficiency, unspecified etiology  Mitral valve insufficiency, unspecified etiology  Aortic valve insufficiency, etiology of cardiac valve disease unspecified  IDDM (insulin dependent diabetes mellitus)  COPD, mild  Atrial fibrillation, unspecified type  Diabetes  Cardiomegaly  Congestive heart failure, unspecified congestive heart failure chronicity, unspecified congestive heart failure type  H/O: hysterectomy  History of laparoscopic cholecystectomy  H/O splenectomy      PREVIOUS DIAGNOSTIC TESTING:      ECHO  FINDINGS:    STRESS  FINDINGS:    CATHETERIZATION  FINDINGS:    MEDICATIONS  (STANDING):  ALBUTerol/ipratropium for Nebulization 3 milliLiter(s) Nebulizer every 6 hours  alvimopan 12 milliGRAM(s) Oral two times a day  amylase/lipase/protease  (CREON  6,000 Units) 1 Capsule(s) Oral three times a day with meals  ascorbic acid 500 milliGRAM(s) Oral daily  calcium carbonate 1250 mG + Vitamin D (OsCal 500 + D) 1 Tablet(s) Oral two times a day  dextrose 5%. 1000 milliLiter(s) (50 mL/Hr) IV Continuous <Continuous>  dextrose 50% Injectable 12.5 Gram(s) IV Push once  dextrose 50% Injectable 25 Gram(s) IV Push once  dextrose 50% Injectable 25 Gram(s) IV Push once  diltiazem    milliGRAM(s) Oral daily  heparin  Injectable 5000 Unit(s) SubCutaneous every 8 hours  hydrALAZINE 50 milliGRAM(s) Oral two times a day  insulin lispro (HumaLOG) corrective regimen sliding scale   SubCutaneous every 6 hours  lidocaine   Patch 1 Patch Transdermal daily  magnesium sulfate  IVPB 1 Gram(s) IV Intermittent once  methimazole 2.5 milliGRAM(s) Oral <User Schedule>  metoprolol     tartrate 25 milliGRAM(s) Oral daily  mirtazapine 15 milliGRAM(s) Oral at bedtime  potassium chloride   Powder 40 milliEquivalent(s) Oral two times a day  torsemide 40 milliGRAM(s) Oral before breakfast    MEDICATIONS  (PRN):  ALBUTerol    0.083% 2.5 milliGRAM(s) Nebulizer every 3 hours PRN Bronchospasm  ALPRAZolam 0.125 milliGRAM(s) Oral two times a day PRN anxiety  dextrose Gel 1 Dose(s) Oral once PRN Blood Glucose LESS THAN 70 milliGRAM(s)/deciliter  glucagon  Injectable 1 milliGRAM(s) IntraMuscular once PRN Glucose LESS THAN 70 milligrams/deciliter  ondansetron Injectable 4 milliGRAM(s) IV Push every 6 hours PRN Nausea      FAMILY HISTORY:  No pertinent family history in first degree relatives      ROS: Negative other than as mentioned in HPI.    Vital Signs Last 24 Hrs  T(C): 37.2 (30 Nov 2017 09:10), Max: 37.3 (30 Nov 2017 04:20)  T(F): 98.9 (30 Nov 2017 09:10), Max: 99.2 (30 Nov 2017 04:20)  HR: 80 (30 Nov 2017 09:10) (78 - 109)  BP: 128/68 (30 Nov 2017 09:10) (125/63 - 151/78)  BP(mean): --  RR: 16 (30 Nov 2017 09:10) (17 - 20)  SpO2: 96% (30 Nov 2017 09:10) (94% - 97%)    PHYSICAL EXAM:  General: Appears well developed, well nourished alert and cooperative. Obese alert afebrile Jena elderly F NAD  HEENT: Head; normocephalic, atraumatic.  Eyes;   Pupils reactive, cornea wnl.  Neck; Supple, no nodes adenopathy, no NVD or carotid bruit or thyromegaly.  CARDIOVASCULAR; No murmur, rub, gallop or lift. Normal S1 and S2. irreg rhythm  LUNGS; No rales, rhonchi or wheeze. Normal breath sounds bilaterally.  ABDOMEN ; Soft, mildly tender without mass or organomegaly. bowel sounds normoactive.  EXTREMITIES; No clubbing, cyanosis or edema. Distal pulses wnl. ROM normal.  SKIN; warm and dry with normal turgor.  NEURO; Alert/oriented x 3/normal motor exam.  PSYCH; normal affect.            INTERPRETATION OF TELEMETRY:    ECG: atrial fib lbbb    I&O's Detail    29 Nov 2017 07:01  -  30 Nov 2017 07:00  --------------------------------------------------------  IN:    Oral Fluid: 600 mL  Total IN: 600 mL    OUT:    Indwelling Catheter - Urethral: 2275 mL  Total OUT: 2275 mL    Total NET: -1675 mL          LABS:                        9.7    10.0  )-----------( 446      ( 30 Nov 2017 06:51 )             32.0     11-30    142  |  99  |  30.0<H>  ----------------------------<  126<H>  3.2<L>   |  26.0  |  1.21    Ca    9.5      30 Nov 2017 06:51  Phos  2.6     11-30  Mg     1.8     11-30      CARDIAC MARKERS ( 28 Nov 2017 21:40 )  x     / 0.03 ng/mL / x     / x     / x              I&O's Summary    29 Nov 2017 07:01  -  30 Nov 2017 07:00  --------------------------------------------------------  IN: 600 mL / OUT: 2275 mL / NET: -1675 mL        RADIOLOGY & ADDITIONAL STUDIES:

## 2017-11-30 NOTE — PROGRESS NOTE ADULT - SUBJECTIVE AND OBJECTIVE BOX
INTERVAL HPI/OVERNIGHT EVENTS: No events over night, patient calmer with little to no tachycardia or agitation.  Presently resting comfortably but confused.  Denies pain. Denies BM.     STATUS POST:  Sigmoid resection    POST OPERATIVE DAY #: 4    SUBJECTIVE:  Flatus: [x ] YES [ ] NO              Bowel Movement: [ ] YES [ x] NO  Pain Control Adequate: [x ] YES [ ] NO  Nausea: [ ] YES [x ] NO            Vomiting: [ ] YES [x ] NO  Diarrhea: [ ] YES [x ] NO         Constipation: [ ] YES [x ] NO     Chest Pain: [ ] YES [x ] NO    SOB:  [ ] YES [x ] NO    MEDICATIONS  (STANDING):  ALBUTerol/ipratropium for Nebulization 3 milliLiter(s) Nebulizer every 6 hours  alvimopan 12 milliGRAM(s) Oral two times a day  amylase/lipase/protease  (CREON  6,000 Units) 1 Capsule(s) Oral three times a day with meals  ascorbic acid 500 milliGRAM(s) Oral daily  calcium carbonate 1250 mG + Vitamin D (OsCal 500 + D) 1 Tablet(s) Oral two times a day  dextrose 5%. 1000 milliLiter(s) (50 mL/Hr) IV Continuous <Continuous>  dextrose 50% Injectable 12.5 Gram(s) IV Push once  dextrose 50% Injectable 25 Gram(s) IV Push once  dextrose 50% Injectable 25 Gram(s) IV Push once  diltiazem    milliGRAM(s) Oral daily  heparin  Injectable 5000 Unit(s) SubCutaneous every 8 hours  hydrALAZINE 50 milliGRAM(s) Oral two times a day  insulin lispro (HumaLOG) corrective regimen sliding scale   SubCutaneous every 6 hours  lidocaine   Patch 1 Patch Transdermal daily  magnesium sulfate  IVPB 1 Gram(s) IV Intermittent once  methimazole 2.5 milliGRAM(s) Oral <User Schedule>  metoprolol     tartrate 25 milliGRAM(s) Oral daily  mirtazapine 15 milliGRAM(s) Oral at bedtime  potassium chloride   Powder 40 milliEquivalent(s) Oral two times a day  torsemide 40 milliGRAM(s) Oral before breakfast    MEDICATIONS  (PRN):  ALBUTerol    0.083% 2.5 milliGRAM(s) Nebulizer every 3 hours PRN Bronchospasm  ALPRAZolam 0.125 milliGRAM(s) Oral two times a day PRN anxiety  dextrose Gel 1 Dose(s) Oral once PRN Blood Glucose LESS THAN 70 milliGRAM(s)/deciliter  glucagon  Injectable 1 milliGRAM(s) IntraMuscular once PRN Glucose LESS THAN 70 milligrams/deciliter  ondansetron Injectable 4 milliGRAM(s) IV Push every 6 hours PRN Nausea      Vital Signs Last 24 Hrs  T(C): 37.3 (30 Nov 2017 04:20), Max: 37.3 (30 Nov 2017 04:20)  T(F): 99.2 (30 Nov 2017 04:20), Max: 99.2 (30 Nov 2017 04:20)  HR: 81 (30 Nov 2017 04:20) (78 - 109)  BP: 132/71 (30 Nov 2017 04:20) (125/63 - 151/78)  BP(mean): --  RR: 20 (30 Nov 2017 04:20) (17 - 20)  SpO2: 96% (30 Nov 2017 04:20) (94% - 97%)    PHYSICAL EXAM:      Constitutional: Alert, oriented to person and place    Respiratory: O2 sat improved, tolerating room air     Cardiovascular: Afib, rate controlled     Gastrointestinal: Abdomen soft, provena in place with good seal. ND/NT    Genitourinary: u/o adequate, diuresing adequately    Extremities: No edema      I&O's Detail    29 Nov 2017 07:01  -  30 Nov 2017 07:00  --------------------------------------------------------  IN:    Oral Fluid: 600 mL  Total IN: 600 mL    OUT:    Indwelling Catheter - Urethral: 2275 mL  Total OUT: 2275 mL    Total NET: -1675 mL          LABS:                        9.7    10.0  )-----------( 446      ( 30 Nov 2017 06:51 )             32.0     11-30    142  |  99  |  30.0<H>  ----------------------------<  126<H>  3.2<L>   |  26.0  |  1.21    Ca    9.5      30 Nov 2017 06:51  Phos  2.6     11-30  Mg     1.8     11-30            RADIOLOGY & ADDITIONAL STUDIES:

## 2017-11-30 NOTE — PROGRESS NOTE ADULT - SUBJECTIVE AND OBJECTIVE BOX
HPI:  88 yo female, c/o progressively enlarging abd girth over 1 yr, s/p colonoscopy 10/19/17, found to have tubular adenoma at ileocecal valve and 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 Pt denies Nausea/vomiting, diarrhea. C/o constipation. Denies fever/chills.    Pt direct admission for operative intervention by Dr Flowers on 11/20.    Pt c/o SOB/dyspnea and mild vague chest discomfort upon admission to the floor, also c/o tender inner left lower leg, also c/o LE edema L > R and coldness of LE's    Pulmonary consult - Dr Bang,    Primary care consult - Dr Barbosa    Cardiology consult - Dr Bush (16 Nov 2017 15:13)     Allergies    Cipro (Unknown)  contrast media (iodine-based) (Unknown)  penicillins (Unknown)  shellfish (Unknown)  Valium (Unknown)    Intolerances      Renal insufficiency  Malignant neoplasm of descending colon  Microcytic anemia  ILD (interstitial lung disease)  Tricuspid valve insufficiency, unspecified etiology  Mitral valve insufficiency, unspecified etiology  Aortic valve insufficiency, etiology of cardiac valve disease unspecified  IDDM (insulin dependent diabetes mellitus)  COPD, mild  Atrial fibrillation, unspecified type  Diabetes  Cardiomegaly  Congestive heart failure, unspecified congestive heart failure chronicity, unspecified congestive heart failure type    MEDICATIONS  (STANDING):  ALBUTerol/ipratropium for Nebulization 3 milliLiter(s) Nebulizer every 6 hours  alvimopan 12 milliGRAM(s) Oral two times a day  amylase/lipase/protease  (CREON  6,000 Units) 1 Capsule(s) Oral three times a day with meals  ascorbic acid 500 milliGRAM(s) Oral daily  calcium carbonate 1250 mG + Vitamin D (OsCal 500 + D) 1 Tablet(s) Oral two times a day  dextrose 5%. 1000 milliLiter(s) (50 mL/Hr) IV Continuous <Continuous>  dextrose 50% Injectable 12.5 Gram(s) IV Push once  dextrose 50% Injectable 25 Gram(s) IV Push once  dextrose 50% Injectable 25 Gram(s) IV Push once  diltiazem    milliGRAM(s) Oral daily  heparin  Injectable 5000 Unit(s) SubCutaneous every 8 hours  hydrALAZINE 50 milliGRAM(s) Oral two times a day  insulin lispro (HumaLOG) corrective regimen sliding scale   SubCutaneous every 6 hours  lidocaine   Patch 1 Patch Transdermal daily  methimazole 2.5 milliGRAM(s) Oral <User Schedule>  metoprolol     tartrate 25 milliGRAM(s) Oral daily  mirtazapine 15 milliGRAM(s) Oral at bedtime  potassium chloride   Powder 40 milliEquivalent(s) Oral two times a day  torsemide 40 milliGRAM(s) Oral before breakfast  warfarin 5 milliGRAM(s) Oral once    MEDICATIONS  (PRN):  ALBUTerol    0.083% 2.5 milliGRAM(s) Nebulizer every 3 hours PRN Bronchospasm  ALPRAZolam 0.125 milliGRAM(s) Oral two times a day PRN anxiety  dextrose Gel 1 Dose(s) Oral once PRN Blood Glucose LESS THAN 70 milliGRAM(s)/deciliter  glucagon  Injectable 1 milliGRAM(s) IntraMuscular once PRN Glucose LESS THAN 70 milligrams/deciliter  ondansetron Injectable 4 milliGRAM(s) IV Push every 6 hours PRN Nausea                           9.7    10.0  )-----------( 446      ( 30 Nov 2017 06:51 )             32.0     11-30    142  |  99  |  30.0<H>  ----------------------------<  126<H>  3.2<L>   |  26.0  |  1.21    Ca    9.5      30 Nov 2017 06:51  Phos  2.6     11-30  Mg     1.8     11-30        ;  Vital Signs Last 24 Hrs  T(C): 36.9 (30 Nov 2017 16:34), Max: 37.3 (30 Nov 2017 04:20)  T(F): 98.5 (30 Nov 2017 16:34), Max: 99.2 (30 Nov 2017 04:20)  HR: 84 (30 Nov 2017 16:34) (78 - 104)  BP: 134/70 (30 Nov 2017 16:34) (125/63 - 151/78)  BP(mean): --  RR: 18 (30 Nov 2017 16:34) (17 - 20)  SpO2: 97% (30 Nov 2017 16:34) (94% - 97%)  CAPILLARY BLOOD GLUCOSE      11-29 @ 07:01  -  11-30 @ 07:00  --------------------------------------------------------  IN: 600 mL / OUT: 2275 mL / NET: -1675 mL    11-30 @ 07:01  -  11-30 @ 18:57  --------------------------------------------------------  IN: 0 mL / OUT: 620 mL / NET: -620 mL        Patient  denies No CP,  SOB, No N/V    HEENT: MARCOS  NGT  Neck: Supple +JVD  Cardio: S1 S2  TRISH Irregular  Pulm: CTA Upper Lower lobes Low course crackles with rales  Abd: Soft NT +Distension BS+, incision clean  Rectal - refused  Ext: No DCT  Skin: No Rash  Neuro: Awake Non Verbal 5/5 power follows some commands        AMS - increase risk for CVA CT Head, Stat ABG  Acute Congestive heart failure -improved torsemide   COPD - increase neb frequency with cont o2  Moderate Severe- Anxiety Remerone add low dose xanax  agrees (will watch for paradoxical reaction)  Malignant neoplasm of descending colon - Pain control await bowel fxn, NGT- for decompression  Renal insufficiency - Nephology following   Microcytic anemia - secondary to age and colon cancer  ILD (interstitial lung disease) - Pulm  Multivalvular Dz - Cardiology  IDDM (insulin dependent diabetes mellitus) -  Lantus tonight and SS  Atrial fibrillation - Rate controlled, restart coumadin once CT results known

## 2017-11-30 NOTE — CHART NOTE - NSCHARTNOTEFT_GEN_A_CORE
Code Called by Dr Edmond for new onset of aphasia. Last known well 11/30/17 1620.   88 yo female, c/o progressively enlarging abd girth over 1 yr, s/p colonoscopy 10/19/17, found to have tubular adenoma at ileocecal valve and 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 S/p Resection POD # 4.   Patient is aphasic, maintaining airway, breathing non-labored and no chest pain.     VS:  BP- 149/69  HR-82  RR-13  T- 98.2F  O2sat - 96% RA    GEN: Aphasic patient, anxious appearing  CVS: Irregular rate/Rhythm, no murmurs appreciated  Lungs: CTAB, no wheezing/rales  Abdomen: NT, ND  Ext: No cyanosis, edema  Neuro: Awake, alert, unable to determine orientation. Patient not cooperative with questioning, or verbal commands however is responsive to painful stimuli.     A/P:  Code stroke called for new onset aphasia  -Labs repeated  -Patient not tPA candidate  -CT head performed and read by Dr Kenny at 1740, no acute infarcts or hemorrhage  -EKG with no acute changes, unchanged from previous EKG  -Dr Carrera Neurology called and aware.   -MRI brain ordered for AM, BP meds discontinued, Metoprolol PRN ordered for rate control, Heparin drip ordered for stroke  -Dr Edmond Aware of plan.  -NPO, dysphagia screen, PT eval, final disposition to Stroke Unit.

## 2017-12-01 LAB
ANION GAP SERPL CALC-SCNC: 17 MMOL/L — SIGNIFICANT CHANGE UP (ref 5–17)
APTT BLD: 26.8 SEC — LOW (ref 27.5–37.4)
APTT BLD: 27 SEC — LOW (ref 27.5–37.4)
APTT BLD: >200 SEC — SIGNIFICANT CHANGE UP (ref 27.5–37.4)
BLOOD GAS COMMENTS ARTERIAL: SIGNIFICANT CHANGE UP
BUN SERPL-MCNC: 31 MG/DL — HIGH (ref 8–20)
CALCIUM SERPL-MCNC: 8.8 MG/DL — SIGNIFICANT CHANGE UP (ref 8.6–10.2)
CHLORIDE SERPL-SCNC: 98 MMOL/L — SIGNIFICANT CHANGE UP (ref 98–107)
CO2 SERPL-SCNC: 26 MMOL/L — SIGNIFICANT CHANGE UP (ref 22–29)
CREAT SERPL-MCNC: 1.19 MG/DL — SIGNIFICANT CHANGE UP (ref 0.5–1.3)
FERRITIN SERPL-MCNC: 34.4 NG/ML — SIGNIFICANT CHANGE UP (ref 11–306)
GAS PNL BLDA: SIGNIFICANT CHANGE UP
GLUCOSE BLDC GLUCOMTR-MCNC: 137 MG/DL — HIGH (ref 70–99)
GLUCOSE BLDC GLUCOMTR-MCNC: 159 MG/DL — HIGH (ref 70–99)
GLUCOSE BLDC GLUCOMTR-MCNC: 165 MG/DL — HIGH (ref 70–99)
GLUCOSE BLDC GLUCOMTR-MCNC: 173 MG/DL — HIGH (ref 70–99)
GLUCOSE SERPL-MCNC: 194 MG/DL — HIGH (ref 70–115)
HCO3 BLDA-SCNC: 31 MMOL/L — HIGH (ref 20–26)
HCT VFR BLD CALC: 31.1 % — LOW (ref 37–47)
HGB BLD-MCNC: 9.7 G/DL — LOW (ref 12–16)
HOROWITZ INDEX BLDA+IHG-RTO: SIGNIFICANT CHANGE UP
INR BLD: 1.43 RATIO — HIGH (ref 0.88–1.16)
IRON SATN MFR SERPL: 12 UG/DL — LOW (ref 37–145)
IRON SATN MFR SERPL: 3 % — LOW (ref 14–50)
MAGNESIUM SERPL-MCNC: 1.7 MG/DL — SIGNIFICANT CHANGE UP (ref 1.6–2.6)
MAGNESIUM SERPL-MCNC: 1.9 MG/DL — SIGNIFICANT CHANGE UP (ref 1.6–2.6)
MCHC RBC-ENTMCNC: 21.9 PG — LOW (ref 27–31)
MCHC RBC-ENTMCNC: 31.2 G/DL — LOW (ref 32–36)
MCV RBC AUTO: 70.4 FL — LOW (ref 81–99)
PCO2 BLDA: 32 MMHG — LOW (ref 35–45)
PH BLDA: 7.57 — HIGH (ref 7.35–7.45)
PHOSPHATE SERPL-MCNC: 4.2 MG/DL — SIGNIFICANT CHANGE UP (ref 2.4–4.7)
PLATELET # BLD AUTO: 442 K/UL — HIGH (ref 150–400)
PO2 BLDA: 83 MMHG — SIGNIFICANT CHANGE UP (ref 83–108)
POTASSIUM SERPL-MCNC: 2.9 MMOL/L — CRITICAL LOW (ref 3.5–5.3)
POTASSIUM SERPL-MCNC: 3.4 MMOL/L — LOW (ref 3.5–5.3)
POTASSIUM SERPL-SCNC: 2.9 MMOL/L — CRITICAL LOW (ref 3.5–5.3)
POTASSIUM SERPL-SCNC: 3.4 MMOL/L — LOW (ref 3.5–5.3)
PROTHROM AB SERPL-ACNC: 15.8 SEC — HIGH (ref 9.8–12.7)
RBC # BLD: 4.42 M/UL — SIGNIFICANT CHANGE UP (ref 4.4–5.2)
RBC # FLD: 20.7 % — HIGH (ref 11–15.6)
SAO2 % BLDA: 97 % — SIGNIFICANT CHANGE UP (ref 95–99)
SODIUM SERPL-SCNC: 141 MMOL/L — SIGNIFICANT CHANGE UP (ref 135–145)
TIBC SERPL-MCNC: 399 UG/DL — SIGNIFICANT CHANGE UP (ref 220–430)
TRANSFERRIN SERPL-MCNC: 279 MG/DL — SIGNIFICANT CHANGE UP (ref 192–382)
TSH SERPL-MCNC: 0.61 UIU/ML — SIGNIFICANT CHANGE UP (ref 0.27–4.2)
WBC # BLD: 10.1 K/UL — SIGNIFICANT CHANGE UP (ref 4.8–10.8)
WBC # FLD AUTO: 10.1 K/UL — SIGNIFICANT CHANGE UP (ref 4.8–10.8)

## 2017-12-01 PROCEDURE — 71010: CPT | Mod: 26

## 2017-12-01 PROCEDURE — 70551 MRI BRAIN STEM W/O DYE: CPT | Mod: 26

## 2017-12-01 PROCEDURE — 93880 EXTRACRANIAL BILAT STUDY: CPT | Mod: 26

## 2017-12-01 PROCEDURE — 99233 SBSQ HOSP IP/OBS HIGH 50: CPT

## 2017-12-01 RX ORDER — METOPROLOL TARTRATE 50 MG
5 TABLET ORAL EVERY 6 HOURS
Qty: 0 | Refills: 0 | Status: DISCONTINUED | OUTPATIENT
Start: 2017-12-01 | End: 2017-12-05

## 2017-12-01 RX ORDER — MAGNESIUM SULFATE 500 MG/ML
2 VIAL (ML) INJECTION ONCE
Qty: 0 | Refills: 0 | Status: DISCONTINUED | OUTPATIENT
Start: 2017-12-01 | End: 2017-12-06

## 2017-12-01 RX ORDER — MORPHINE SULFATE 50 MG/1
2 CAPSULE, EXTENDED RELEASE ORAL EVERY 12 HOURS
Qty: 0 | Refills: 0 | Status: DISCONTINUED | OUTPATIENT
Start: 2017-12-01 | End: 2017-12-02

## 2017-12-01 RX ORDER — POTASSIUM CHLORIDE 20 MEQ
10 PACKET (EA) ORAL
Qty: 0 | Refills: 0 | Status: COMPLETED | OUTPATIENT
Start: 2017-12-01 | End: 2017-12-01

## 2017-12-01 RX ORDER — SODIUM CHLORIDE 9 MG/ML
1000 INJECTION INTRAMUSCULAR; INTRAVENOUS; SUBCUTANEOUS
Qty: 0 | Refills: 0 | Status: DISCONTINUED | OUTPATIENT
Start: 2017-12-01 | End: 2017-12-02

## 2017-12-01 RX ADMIN — Medication 3 MILLILITER(S): at 15:49

## 2017-12-01 RX ADMIN — Medication 100 MILLIEQUIVALENT(S): at 04:04

## 2017-12-01 RX ADMIN — HEPARIN SODIUM 500 UNIT(S)/HR: 5000 INJECTION INTRAVENOUS; SUBCUTANEOUS at 07:48

## 2017-12-01 RX ADMIN — MORPHINE SULFATE 2 MILLIGRAM(S): 50 CAPSULE, EXTENDED RELEASE ORAL at 22:08

## 2017-12-01 RX ADMIN — Medication 3 MILLILITER(S): at 09:29

## 2017-12-01 RX ADMIN — Medication 2: at 05:16

## 2017-12-01 RX ADMIN — Medication 2: at 14:16

## 2017-12-01 RX ADMIN — LIDOCAINE 1 PATCH: 4 CREAM TOPICAL at 19:22

## 2017-12-01 RX ADMIN — HEPARIN SODIUM 700 UNIT(S)/HR: 5000 INJECTION INTRAVENOUS; SUBCUTANEOUS at 22:54

## 2017-12-01 RX ADMIN — Medication 3 MILLILITER(S): at 20:57

## 2017-12-01 RX ADMIN — Medication 3 MILLILITER(S): at 03:19

## 2017-12-01 RX ADMIN — SODIUM CHLORIDE 80 MILLILITER(S): 9 INJECTION INTRAMUSCULAR; INTRAVENOUS; SUBCUTANEOUS at 02:14

## 2017-12-01 RX ADMIN — Medication 2: at 23:44

## 2017-12-01 RX ADMIN — Medication 100 MILLIEQUIVALENT(S): at 02:13

## 2017-12-01 RX ADMIN — Medication 100 MILLIEQUIVALENT(S): at 03:32

## 2017-12-01 RX ADMIN — HEPARIN SODIUM 600 UNIT(S)/HR: 5000 INJECTION INTRAVENOUS; SUBCUTANEOUS at 14:57

## 2017-12-01 RX ADMIN — MORPHINE SULFATE 2 MILLIGRAM(S): 50 CAPSULE, EXTENDED RELEASE ORAL at 23:00

## 2017-12-01 RX ADMIN — HEPARIN SODIUM 0 UNIT(S)/HR: 5000 INJECTION INTRAVENOUS; SUBCUTANEOUS at 06:38

## 2017-12-01 NOTE — CONSULT NOTE ADULT - SUBJECTIVE AND OBJECTIVE BOX
CHIEF COMPLAINT:    HPI: 89yFemale      PAST MEDICAL & SURGICAL HISTORY:  Renal insufficiency  Malignant neoplasm of descending colon  Microcytic anemia  ILD (interstitial lung disease)  Tricuspid valve insufficiency, unspecified etiology  Mitral valve insufficiency, unspecified etiology  Aortic valve insufficiency, etiology of cardiac valve disease unspecified  IDDM (insulin dependent diabetes mellitus)  COPD, mild  Atrial fibrillation, unspecified type  Diabetes  Cardiomegaly  Congestive heart failure, unspecified congestive heart failure chronicity, unspecified congestive heart failure type  H/O: hysterectomy  History of laparoscopic cholecystectomy  H/O splenectomy    MEDICATIONS  (STANDING):  ALBUTerol/ipratropium for Nebulization 3 milliLiter(s) Nebulizer every 6 hours  alvimopan 12 milliGRAM(s) Oral two times a day  amylase/lipase/protease  (CREON  6,000 Units) 1 Capsule(s) Oral three times a day with meals  ascorbic acid 500 milliGRAM(s) Oral daily  calcium carbonate 1250 mG + Vitamin D (OsCal 500 + D) 1 Tablet(s) Oral two times a day  dextrose 5%. 1000 milliLiter(s) (50 mL/Hr) IV Continuous <Continuous>  dextrose 50% Injectable 12.5 Gram(s) IV Push once  dextrose 50% Injectable 25 Gram(s) IV Push once  dextrose 50% Injectable 25 Gram(s) IV Push once  heparin  Infusion.  Unit(s)/Hr (7 mL/Hr) IV Continuous <Continuous>  insulin lispro (HumaLOG) corrective regimen sliding scale   SubCutaneous every 6 hours  lidocaine   Patch 1 Patch Transdermal daily  LORazepam   Injectable 0.5 milliGRAM(s) IntraMuscular once  methimazole 2.5 milliGRAM(s) Oral <User Schedule>  mirtazapine 15 milliGRAM(s) Oral at bedtime  sodium chloride 0.9%. 1000 milliLiter(s) (60 mL/Hr) IV Continuous <Continuous>    MEDICATIONS  (PRN):  ALBUTerol    0.083% 2.5 milliGRAM(s) Nebulizer every 3 hours PRN Bronchospasm  ALPRAZolam 0.125 milliGRAM(s) Oral two times a day PRN anxiety  dextrose Gel 1 Dose(s) Oral once PRN Blood Glucose LESS THAN 70 milliGRAM(s)/deciliter  glucagon  Injectable 1 milliGRAM(s) IntraMuscular once PRN Glucose LESS THAN 70 milligrams/deciliter  magnesium sulfate  IVPB 2 Gram(s) IV Intermittent once PRN magnesium <1.8  metoprolol    tartrate Injectable 5 milliGRAM(s) IV Push every 6 hours PRN For HR above 100  ondansetron Injectable 4 milliGRAM(s) IV Push every 6 hours PRN Nausea    Allergies    Cipro (Unknown)  contrast media (iodine-based) (Unknown)  penicillins (Unknown)  shellfish (Unknown)  Valium (Unknown)    Intolerances        FAMILY HISTORY:  No pertinent family history in first degree relatives          SOCIAL HISTORY:    Tobacco:    Alcohol:    Drugs:          REVIEW OF SYSTEMS:    Relevant systems are negative except as noted in the chart, HPI, and PMH      VITAL SIGNS:  Vital Signs Last 24 Hrs  T(C): 36.8 (01 Dec 2017 08:20), Max: 37.3 (30 Nov 2017 22:49)  T(F): 98.3 (01 Dec 2017 08:20), Max: 99.1 (30 Nov 2017 22:49)  HR: 75 (01 Dec 2017 04:31) (75 - 89)  BP: 143/66 (01 Dec 2017 04:31) (134/70 - 151/72)  BP(mean): --  RR: 22 (01 Dec 2017 04:31) (13 - 25)  SpO2: 100% (01 Dec 2017 04:31) (95% - 100%)    PHYSICAL EXAMINATION:    General: Well-developed, well nourished, in no acute distress.  Cardiac:  Regular rate and rhythm. No carotid bruits appreciated.  Eyes: Fundoscopic examination was deferred.  Neurologic:  - Mental Status:  Alert, awake, Does not follow command, moans but otherwise nonverbal.   Cranial Nerves II-XII:    II:  Visual acuity is normal for age ; Visual fields are full to confrontation; Pupils- mild aniscorai r 3mm, l 4mm  III, IV, VI:  Extraocular movements are intact without nystagmus.  V:  Facial sensation is intact in the V1-V3 distribution bilaterally.  VII:  Face is mild right NLF  VIII:  Hearing is grossly intact  IX, X, XII:  speech is clear  XI:  Head turning and shoulder shrug are intact.  - Motor:  Strength - no formal testing.  She moves left spontaneously and localizes. There is some movement on the right but less so.  Does not localize with RUE.   - Reflexes:  1+ and symmetric at the knees.  Plantar responses extensor on righ        LABS:                          9.7    10.1  )-----------( 442      ( 01 Dec 2017 05:53 )             31.1     01 Dec 2017 05:53    141    |  98     |  31.0   ----------------------------<  194    3.4     |  26.0   |  1.19     Ca    8.8        01 Dec 2017 05:53  Phos  4.2       01 Dec 2017 05:53  Mg     1.7       01 Dec 2017 05:53    TPro  6.5    /  Alb  3.2    /  TBili  0.5    /  DBili  x      /  AST  22     /  ALT  20     /  AlkPhos  81     30 Nov 2017 20:41    LIVER FUNCTIONS - ( 30 Nov 2017 20:41 )  Alb: 3.2 g/dL / Pro: 6.5 g/dL / ALK PHOS: 81 U/L / ALT: 20 U/L / AST: 22 U/L / GGT: x           PT/INR - ( 01 Dec 2017 05:53 )   PT: 15.8 sec;   INR: 1.43 ratio         PTT - ( 01 Dec 2017 05:53 )  PTT:>200.0 sec      RADIOLOGY & ADDITIONAL STUDIES:    CT- 11/30 - no acute changes    IMPRESSION:    89 year old s/p code stroke last night after being found less responsive and aphasic.  S/p bowel resection for sepsis/small bowel obstrtucion  Her exam this morning shows her to be nonverbal likley aphasic with right hemiparesis -concern for stroke. CT was negative. For MRI    PLAN:  1.  Brain MRI today  2. Medical and Cardiac evaluation and treatment as indicated. Post op surgical management  4. May consider vascular imaging studies but will not change short term management  5. Prognosis TBD after MRI

## 2017-12-01 NOTE — DISCHARGE NOTE ADULT - PATIENT PORTAL LINK FT
“You can access the [employer] Patient Portal, offered by [facility], by registering with the following website: [email or web address]”

## 2017-12-01 NOTE — PROGRESS NOTE ADULT - SUBJECTIVE AND OBJECTIVE BOX
PULMONARY PROGRESS NOTE      GLENNA QUINN  MRN-[id number]    Patient is a 89y old  Female who presents with a chief complaint of colon mass (2017 15:13)      INTERVAL HPI/OVERNIGHT EVENTS:    Patient seen by our service earlier in the hospitalization for clearance for bowel resection due to obstruction. Patient is followed in our office for a h/o mild COPD and pulm HTN. The patient is a never smoker per  but did have second hand smoke exposure. Her hospital course was complicated by sepsis but eventually did have her bowel resection. She has a h/o CVA in the past as per her husbanc and now appears to have had another CVA. Patient opens eyes and is responsive but is not communicative currently. ABG reveals a respiratory alkalosis which is of unclear etiology.    MEDICATIONS  (STANDING):  ALBUTerol/ipratropium for Nebulization 3 milliLiter(s) Nebulizer every 6 hours  alvimopan 12 milliGRAM(s) Oral two times a day  amylase/lipase/protease  (CREON  6,000 Units) 1 Capsule(s) Oral three times a day with meals  ascorbic acid 500 milliGRAM(s) Oral daily  calcium carbonate 1250 mG + Vitamin D (OsCal 500 + D) 1 Tablet(s) Oral two times a day  dextrose 5%. 1000 milliLiter(s) (50 mL/Hr) IV Continuous <Continuous>  dextrose 50% Injectable 12.5 Gram(s) IV Push once  dextrose 50% Injectable 25 Gram(s) IV Push once  dextrose 50% Injectable 25 Gram(s) IV Push once  heparin  Infusion.  Unit(s)/Hr (7 mL/Hr) IV Continuous <Continuous>  insulin lispro (HumaLOG) corrective regimen sliding scale   SubCutaneous every 6 hours  lidocaine   Patch 1 Patch Transdermal daily  LORazepam   Injectable 0.5 milliGRAM(s) IntraMuscular once  methimazole 2.5 milliGRAM(s) Oral <User Schedule>  mirtazapine 15 milliGRAM(s) Oral at bedtime  sodium chloride 0.9%. 1000 milliLiter(s) (60 mL/Hr) IV Continuous <Continuous>      MEDICATIONS  (PRN):  ALBUTerol    0.083% 2.5 milliGRAM(s) Nebulizer every 3 hours PRN Bronchospasm  ALPRAZolam 0.125 milliGRAM(s) Oral two times a day PRN anxiety  dextrose Gel 1 Dose(s) Oral once PRN Blood Glucose LESS THAN 70 milliGRAM(s)/deciliter  glucagon  Injectable 1 milliGRAM(s) IntraMuscular once PRN Glucose LESS THAN 70 milligrams/deciliter  magnesium sulfate  IVPB 2 Gram(s) IV Intermittent once PRN magnesium <1.8  metoprolol    tartrate Injectable 5 milliGRAM(s) IV Push every 6 hours PRN For HR above 100  ondansetron Injectable 4 milliGRAM(s) IV Push every 6 hours PRN Nausea      Allergies    Cipro (Unknown)  contrast media (iodine-based) (Unknown)  penicillins (Unknown)  shellfish (Unknown)  Valium (Unknown)    Intolerances        PAST MEDICAL & SURGICAL HISTORY:  Renal insufficiency  Malignant neoplasm of descending colon  Microcytic anemia  ILD (interstitial lung disease)  Tricuspid valve insufficiency, unspecified etiology  Mitral valve insufficiency, unspecified etiology  Aortic valve insufficiency, etiology of cardiac valve disease unspecified  IDDM (insulin dependent diabetes mellitus)  COPD, mild  Atrial fibrillation, unspecified type  Diabetes  Cardiomegaly  Congestive heart failure, unspecified congestive heart failure chronicity, unspecified congestive heart failure type  H/O: hysterectomy  History of laparoscopic cholecystectomy  H/O splenectomy        REVIEW OF SYSTEMS: Patient is unable to provide    Vital Signs Last 24 Hrs  T(C): 36.8 (01 Dec 2017 08:20), Max: 37.3 (2017 22:49)  T(F): 98.3 (01 Dec 2017 08:20), Max: 99.1 (2017 22:49)  HR: 80 (01 Dec 2017 12:54) (75 - 89)  BP: 136/62 (01 Dec 2017 12:54) (134/70 - 151/72)  BP(mean): 81 (01 Dec 2017 12:54) (81 - 81)  RR: 22 (01 Dec 2017 12:54) (13 - 25)  SpO2: 93% (01 Dec 2017 12:54) (93% - 100%)    PHYSICAL EXAMINATION:    GENERAL: The patient is awake and alert in no apparent distress.     HEENT: Head is normocephalic and atraumatic. Extraocular muscles are intact. Mucous membranes are moist.    NECK: Supple.    LUNGS: Clear to auscultation without wheezing, rales or rhonchi; respirations unlabored    HEART: Regular rate and rhythm without murmur.    ABDOMEN: Soft, nontender, and nondistended.      EXTREMITIES: Without any cyanosis, clubbing, rash, lesions or edema.    NEUROLOGIC: Moving all extremities though L sided weakness and is nonverbal.    LABS:                        9.7    10.1  )-----------( 442      ( 01 Dec 2017 05:53 )             31.1         141  |  98  |  31.0<H>  ----------------------------<  194<H>  3.4<L>   |  26.0  |  1.19    Ca    8.8      01 Dec 2017 05:53  Phos  4.2       Mg     1.7         TPro  6.5<L>  /  Alb  3.2<L>  /  TBili  0.5  /  DBili  x   /  AST  22  /  ALT  20  /  AlkPhos  81      PT/INR - ( 01 Dec 2017 05:53 )   PT: 15.8 sec;   INR: 1.43 ratio         PTT - ( 01 Dec 2017 05:53 )  PTT:>200.0 sec    ABG - ( 01 Dec 2017 04:31 )  pH: 7.57  /  pCO2: 32    /  pO2: 83    / HCO3: 31    / Base Excess: x     /  SaO2: 97          Serum Pro-Brain Natriuretic Peptide: 28550 pg/mL (17 @ 07:08)        RADIOLOGY & ADDITIONAL STUDIES:     EXAM:  MR BRAIN                          PROCEDURE DATE:  2017          INTERPRETATION:  CLINICAL HISTORY: Code stroke, aphasia    COMPARISON: CT head dated 2017    TECHNIQUE: MRI brain: Multiplanar, multisequence MR imaging of the brain   are obtained without the administration of intravenous gadolinium.     FINDINGS:  There a multiple small foci abnormal restricted diffusion to the   bilateral frontal lobes, right parietal lobe, left insular ribbon   compatible to acute embolic infarcts. Scattered periventricular and   subcortical white matter T2 /FLAIR hyperintensities are seen without mass   effect, nonspecific, likely representing mild chronic microvascular   changes.    Normal T2 flow-voids are seen within  the intracranial vasculature. The   lateral ventricles and cortical sulci are age-appropriate in size and   configuration. There is no mass, mass effect, or extra-axial fluid   collection. There is no susceptibility artifact to suggest hemorrhage.   Midline structures are normal.  The patient is status post bilateral   ocular lens replacement surgery. Mild inflammatory mucosal changes are   seen throughout the various portions of the paranasal sinuses. The orbits   and mastoid air cells are unremarkable.     IMPRESSION: Acute small bilateral frontal lobe and right parietal lobe   embolic infarcts.      SIENA SOTO M.D., ATTENDING RADIOLOGIST  This document has been electronically signed. Dec  1 2017  1:24PM       EXAM:  CT BRAIN                          PROCEDURE DATE:  2017          INTERPRETATION:  CLINICAL HISTORY: Code stroke, altered mental status, A.   fib    COMPARISON: CT head dated 2017    TECHNIQUE: Noncontrast CT of the head. Multiplanar reformations are   submitted.    FINDINGS:  There is periventricular and subcortical white matter hypodensity without   mass effect, nonspecific, likely representing mild chronic microvascular   ischemic changes. There is no compelling evidence for an acute   transcortical infarction. There is no evidence of mass, mass effect,   midline shift or extra-axial fluid collection. The lateral ventricles and   cortical sulci are age-appropriate in size and configuration. The orbits,   mastoid air cells and visualized paranasal sinuses are unremarkable. The   calvarium is intact.    IMPRESSION:  Mild chronic microvascular changes without evidence of an   acute transcortical infarction or hemorrhage. MR is a more sensitive   imaging modality for the evaluation of an acute infarction. Findings   discussed with stroke resident at 7:39 PM.                SIENA SOTO M.D., ATTENDING RADIOLOGIST  This document has been electronically signed. 2017  7:53PM           EXAM:  CHEST SINGLE VIEW FRONTAL                          PROCEDURE DATE:  2017          INTERPRETATION:  Chest portable semierect single AP view:    Clinical history:    Shortness of breath. History of congestive heart failure.    Findings:    Global cardiomegaly. Atherosclerotic aorta. Pulmonary venous congestion.   Bilateral pleural fluid. NG tube tip in the distal esophagus. Left upper   quadrant multiple surgical clips may represent splenectomy. Evidence of   cholecystectomy.    Impression:    Congestive heart failure with venous congestion and bilateral pleural   fluid.    NG tube tip in the distal esophagus.      AL LIND M.D., ATTENDING RADIOLOGIST  This document has been electronically signed. 2017  9:37AM            ECHO:    EXAM:  ECHO TRANSTHORACIC COMP W DOPP      PROCEDURE DATE:  2017   .      INTERPRETATION:  REPORT:    TRANSTHORACIC ECHOCARDIOGRAM REPORT           Patient Name:   GLENNA QUINN Patient Location: Inpatient  Medical Rec #:  [id number]                Accession #:      [id number]  Account #:      [id number]                 Height:           61.8 in 157.0   cm  YOB: 1928                Weight:           132.3 lb 60.00   kg  Patient Age:    89 years                 BSA:            1.60 m²  Patient Gender: F                        BP:               140/80 mmHg        Date of Exam:        2017 8:15:55 AM  Sonographer:         Shalini Brewer  Referring Physician: Dong Villa MD     Procedure:   2D Echo/Doppler/Color Doppler Complete.  Indications: Shortness of breath - R06.02  Diagnosis:   Nonrheumatic mitral (valve) insufficiency - I34.0           2D AND M-MODE MEASUREMENTS (normal ranges within parentheses):  Left                Normal    Aorta/Left    Normal  Ventricle:                    Atrium:  IVSd (2D):    1.52  (0.7-1.1) Aortic Root  3.15 cm (2.4-3.7)                cm              (2D):  LVPWd (2D):   1.29  (0.7-1.1) Left Atrium  5.21 cm (1.9-4.0)                cm              (2D):  LVIDd (2D):   3.64  (3.4-5.7) LA Volume    97.8                cm              Index        ml/m²  LVIDs (2D):   2.96                cm  LV FS (2D):   18.7  (>25%)                %  Relative Wall 0.71  (<0.42)  Thickness     LV DIASTOLIC FUNCTION:  MV Peak E: 1.21 m/s e', MV Adeline: 0.05 m/s                      E/e' Ratio: 26.17     SPECTRAL DOPPLER ANALYSIS (where applicable):  Aortic Valve: AoV Max Brando: 2.14 m/s AoV Peak P.3 mmHg AoV Mean P.5 mmHg     LVOT Vmax: 0.84 m/s LVOT VTI: 0.165 m LVOT Diameter: 2.05 cm     AoV Area, Vmax: 1.30 cm² AoV Area, VTI: 1.24 cm² AoV Area, Vmn: 1.18 cm²  Ao VTI: 0.440  Aortic Insufficiency:  AI Half-time: 566 msec     Tricuspid Valve and PA/RV Systolic Pressure: TR Max Velocity: 2.66 m/s   RA Pressure: 8 mmHg RVSP/PASP: 36.2 mmHg        PHYSICIAN INTERPRETATION:  Left Ventricle: The left ventricular internal cavity size is normal. Left   ventricular wall thickness is moderately increased. There is moderate   concentric left ventricular hypertrophy.  Global LV systolic function was mildly decreased. Left ventricular   ejection fraction, by visual estimation, is 45 to 50%. The mitral in-flow   pattern reveals no discernable A-wave, therefore no comment on diastolic   function can be made.        LV Wall Scoring:  The entire septum and apex are hypokinetic.     Right Ventricle: The right ventricular size is normal. RV systolic   function is low normal.  Left Atrium: Severely enlarged left atrium.  Right Atrium: The right atrium is severely dilated.  Pericardium: There is no evidence of pericardial effusion.  Mitral Valve: Thickening and calcification of the anterior and posterior   mitral valve leaflets. Mitral leaflet mobility is normal. There is mild   to moderate mitral annular calcification. Mild mitral valve regurgitation   is seen.  Tricuspid Valve: Moderate tricuspid regurgitation is visualized.   Estimated pulmonary artery systolic pressure is 36.2 mmHg assuming a   right atrial pressure of 8 mmHg, which is consistent with borderline   pulmonary hypertension.  Aortic Valve: The aortic valve is trileaflet. Mild to moderate aortic   stenosis is present. Peak transaortic gradient equals 18.3 mmHg, mean   transaortic gradient equals 9.5 mmHg, the calculated aortic valve area   equals 1.24 cm² by the continuity equation consistent with moderate   aortic stenosis. Mild to moderate aortic valve regurgitation is seen.  Pulmonic Valve: Trace pulmonic valve regurgitation.  Aorta: The aortic root is normal in size and structure.  Pulmonary Artery: The pulmonary artery is mildly dilated.  Venous: The inferior vena cava is not well visualized. IVC not visualized   due to post op bandages recent surgery to the abdomen.        Summary:   1. Left ventricular ejection fraction, byvisual estimation, is 45 to   50%.   2. Mildly decreased global left ventricular systolic function.   3. Entire septum and apex are abnormal as described above.   4. Moderately increased LV wall thickness.   5. Normal left ventricular internal cavitysize.   6. The mitral in-flow pattern reveals no discernable A-wave, therefore   no comment on diastolic function can be made.   7. There is moderate concentric left ventricular hypertrophy.   8. Severely dilated right atrium.   9. There is no evidence of pericardial effusion.  10. Mild mitral valve regurgitation.  11. Mild to moderate mitral annular calcification.  12. Thickening and calcification of the anterior and posterior mitral   valve leaflets.  13. Moderate tricuspid regurgitation.  14. Mild to moderate aortic regurgitation.  15. Mild to moderate aortic valve stenosis.  16. Estimated pulmonary artery systolic pressure is 36.2 mmHg assuming a   right atrial pressure of 8 mmHg, which is consistent with borderline   pulmonary hypertension.  17. Mildly dilated pulmonary artery.  18. Peak transaortic gradient equals 18.3 mmHg, mean transaortic gradient   equals 9.5 mmHg, the calculated aortic valve area equals 1.24 cm² by the   continuity equation consistent with moderate aortic stenosis.  19. Severely enlarged left atrium.     MD Bill Electronically signed on 2017 at 10:05:35 AM

## 2017-12-01 NOTE — PROGRESS NOTE ADULT - ASSESSMENT
Mild CM  VHD with Mild MR/ mild to moderate AI/ moderate AS  Borderline pulm HTN  Mild COPD  New CVA  ? component of CHF given elevated BNP and CXR findings  S/p bowel resection for obstruction  Respiratory alkalosis of unclear etiology but may be due to hyperventilation from anxiety or recent CVA    Plan:  Neurology f/u for new CVA  On heparin gtt  Repeat ABG   Drug nebs for h/o mild COPD  Optimize cardiac function  Diurese as tolerate given VHD and elevated BNP  Follow BNP and CXR for improvement  Anxiolytics as needed  Prognosis guarded

## 2017-12-01 NOTE — PROGRESS NOTE ADULT - SUBJECTIVE AND OBJECTIVE BOX
[facility] - [facility], THE HEART CENTER                                   [address] [place] [address]                                                      PHONE: [phone]                                                         FAX: [phone]  [email or web address]  ---------------------------------------------------------------------------------------------------------------------------------    Overnight events/patient complaints: Found nonverbal likley aphasic with right hemiparesis -concern for stroke. s/p CTH with no acute pathology; pending MRI    Telemetry: atrial fibrillation     PAST MEDICAL & SURGICAL HISTORY:  Renal insufficiency  Malignant neoplasm of descending colon  Microcytic anemia  ILD (interstitial lung disease)  Tricuspid valve insufficiency, unspecified etiology  Mitral valve insufficiency, unspecified etiology  Aortic valve insufficiency, etiology of cardiac valve disease unspecified  IDDM (insulin dependent diabetes mellitus)  COPD, mild  Atrial fibrillation, unspecified type  Diabetes  Cardiomegaly  Congestive heart failure, unspecified congestive heart failure chronicity, unspecified congestive heart failure type  H/O: hysterectomy  History of laparoscopic cholecystectomy  H/O splenectomy    MEDICATIONS  (STANDING):  ALBUTerol/ipratropium for Nebulization 3 milliLiter(s) Nebulizer every 6 hours  alvimopan 12 milliGRAM(s) Oral two times a day  amylase/lipase/protease  (CREON  6,000 Units) 1 Capsule(s) Oral three times a day with meals  ascorbic acid 500 milliGRAM(s) Oral daily  calcium carbonate 1250 mG + Vitamin D (OsCal 500 + D) 1 Tablet(s) Oral two times a day  dextrose 5%. 1000 milliLiter(s) (50 mL/Hr) IV Continuous <Continuous>  dextrose 50% Injectable 12.5 Gram(s) IV Push once  dextrose 50% Injectable 25 Gram(s) IV Push once  dextrose 50% Injectable 25 Gram(s) IV Push once  heparin  Infusion.  Unit(s)/Hr (7 mL/Hr) IV Continuous <Continuous>  insulin lispro (HumaLOG) corrective regimen sliding scale   SubCutaneous every 6 hours  lidocaine   Patch 1 Patch Transdermal daily  LORazepam   Injectable 0.5 milliGRAM(s) IntraMuscular once  methimazole 2.5 milliGRAM(s) Oral <User Schedule>  mirtazapine 15 milliGRAM(s) Oral at bedtime  sodium chloride 0.9%. 1000 milliLiter(s) (60 mL/Hr) IV Continuous <Continuous>    Vital Signs Last 24 Hrs  T(C): 36.8 (01 Dec 2017 08:20), Max: 37.3 (30 Nov 2017 22:49)  T(F): 98.3 (01 Dec 2017 08:20), Max: 99.1 (30 Nov 2017 22:49)  HR: 75 (01 Dec 2017 04:31) (75 - 89)  BP: 143/66 (01 Dec 2017 04:31) (134/70 - 151/72)  BP(mean): --  RR: 22 (01 Dec 2017 04:31) (13 - 25)  SpO2: 100% (01 Dec 2017 04:31) (95% - 100%)  ICU Vital Signs Last 24 Hrs  GLENNA QUINN  I&O's Detail    30 Nov 2017 07:01  -  01 Dec 2017 07:00  --------------------------------------------------------  IN:    heparin  Infusion.: 56 mL    sodium chloride 0.9%.: 320 mL    Solution: 600 mL  Total IN: 976 mL    OUT:    Indwelling Catheter - Urethral: 620 mL  Total OUT: 620 mL    Total NET: 356 mL        I&O's Summary    30 Nov 2017 07:01  -  01 Dec 2017 07:00  --------------------------------------------------------  IN: 976 mL / OUT: 620 mL / NET: 356 mL      PHYSICAL EXAM:  General: elderly woman   HEENT: Head; normocephalic, atraumatic.  Eyes: Pupils reactive, cornea wnl.  Neck: Supple, no nodes adenopathy, no NVD or carotid bruit or thyromegaly.  CARDIOVASCULAR: Normal S1 and S2, No murmur, rub, gallop or lift.   LUNGS: No rales, rhonchi or wheeze. Normal breath sounds bilaterally.  ABDOMEN: Soft, nontender without mass or organomegaly. bowel sounds normoactive.  EXTREMITIES: No clubbing, cyanosis or edema. Distal pulses wnl.   SKIN: warm and dry with normal turgor.  NEURO: non-verbal         LABS:                        9.7    10.1  )-----------( 442      ( 01 Dec 2017 05:53 )             31.1     12-01    141  |  98  |  31.0<H>  ----------------------------<  194<H>  3.4<L>   |  26.0  |  1.19    Ca    8.8      01 Dec 2017 05:53  Phos  4.2     12-01  Mg     1.7     12-01    TPro  6.5<L>  /  Alb  3.2<L>  /  TBili  0.5  /  DBili  x   /  AST  22  /  ALT  20  /  AlkPhos  81  11-30      PT/INR - ( 01 Dec 2017 05:53 )   PT: 15.8 sec;   INR: 1.43 ratio    PTT - ( 01 Dec 2017 05:53 )  PTT:>200.0 sec        ECG: < from: 12 Lead ECG (11.30.17 @ 19:48) >  Atrial fibrillation with a competing junctional pacemaker  Left axis deviation  Left bundle branch block    ECHO: < from: TTE Echo Complete w/Doppler (11.30.17 @ 08:15) >   1. Left ventricular ejection fraction, byvisual estimation, is 45 to 50%.   2. Mildly decreased global left ventricular systolic function.   3. Entire septum and apex are abnormal as described above.   4. Moderately increased LV wall thickness.   5. There is moderate concentric left ventricular hypertrophy.   6. Biatrial dilatation  7.Peak transaortic gradient equals 18.3 mmHg, mean transaortic gradient equals 9.5 mmHg, the calculated aortic valve area equals 1.24 cm² by the continuity equation consistent with moderate aortic stenosis.   8. Mild mitral valve regurgitation.  9. Mild to moderate mitral annular calcification.  10. Moderate tricuspid regurgitation.  11. Mild to moderate aortic regurgitation.  12. Mild to moderate aortic valve stenosis.  13. borderline pulmonary hypertension.  14. Mildly dilated pulmonary artery.      ASSESSMENT AND PLAN:  In summary, GLENNA QUINN is an 89y Female with past medical history significant for atrial fibrillation, ILD, DM, HFpEF who presents with c/o progressively enlarging abd girth over 1 yr, s/p colonoscopy 10/19/17, found to have tubular adenoma at ileocecal valve and villous adenoma with high grade dysplasia/intramucosa carcinoma s/p colectomy now found to have mildly reduced LVEF     Atrial fibrillation ([id number])  - resume coumadin per surgical team timing   - would resume metoprolol and diltiazem     HFpEF  - no evidence of acute decompensation; hold torsemide for now     Thank you for allowing [facility] to participate in the care of this patient.  Please feel free to call with any questions or concerns. [facility] - [facility], [facility]                                   [address] [place] [address]                                                      PHONE: [phone]                                                         FAX: [phone]  [email or web address]  ---------------------------------------------------------------------------------------------------------------------------------    Overnight events/patient complaints: Found aphasic with right hemiparesis -concern for stroke. s/p CTH with no acute pathology; pending MRI    Telemetry: atrial fibrillation     PAST MEDICAL & SURGICAL HISTORY:  Renal insufficiency  Malignant neoplasm of descending colon  Microcytic anemia  ILD (interstitial lung disease)  Tricuspid valve insufficiency, unspecified etiology  Mitral valve insufficiency, unspecified etiology  Aortic valve insufficiency, etiology of cardiac valve disease unspecified  IDDM (insulin dependent diabetes mellitus)  COPD, mild  Atrial fibrillation, unspecified type  Diabetes  Cardiomegaly  Congestive heart failure, unspecified congestive heart failure chronicity, unspecified congestive heart failure type  H/O: hysterectomy  History of laparoscopic cholecystectomy  H/O splenectomy    MEDICATIONS  (STANDING):  ALBUTerol/ipratropium for Nebulization 3 milliLiter(s) Nebulizer every 6 hours  alvimopan 12 milliGRAM(s) Oral two times a day  amylase/lipase/protease  (CREON  6,000 Units) 1 Capsule(s) Oral three times a day with meals  ascorbic acid 500 milliGRAM(s) Oral daily  calcium carbonate 1250 mG + Vitamin D (OsCal 500 + D) 1 Tablet(s) Oral two times a day  dextrose 5%. 1000 milliLiter(s) (50 mL/Hr) IV Continuous <Continuous>  dextrose 50% Injectable 12.5 Gram(s) IV Push once  dextrose 50% Injectable 25 Gram(s) IV Push once  dextrose 50% Injectable 25 Gram(s) IV Push once  heparin  Infusion.  Unit(s)/Hr (7 mL/Hr) IV Continuous <Continuous>  insulin lispro (HumaLOG) corrective regimen sliding scale   SubCutaneous every 6 hours  lidocaine   Patch 1 Patch Transdermal daily  LORazepam   Injectable 0.5 milliGRAM(s) IntraMuscular once  methimazole 2.5 milliGRAM(s) Oral <User Schedule>  mirtazapine 15 milliGRAM(s) Oral at bedtime  sodium chloride 0.9%. 1000 milliLiter(s) (60 mL/Hr) IV Continuous <Continuous>    Vital Signs Last 24 Hrs  T(C): 36.8 (01 Dec 2017 08:20), Max: 37.3 (30 Nov 2017 22:49)  T(F): 98.3 (01 Dec 2017 08:20), Max: 99.1 (30 Nov 2017 22:49)  HR: 75 (01 Dec 2017 04:31) (75 - 89)  BP: 143/66 (01 Dec 2017 04:31) (134/70 - 151/72)  BP(mean): --  RR: 22 (01 Dec 2017 04:31) (13 - 25)  SpO2: 100% (01 Dec 2017 04:31) (95% - 100%)  ICU Vital Signs Last 24 Hrs  GLENNA QUINN  I&O's Detail    30 Nov 2017 07:01  -  01 Dec 2017 07:00  --------------------------------------------------------  IN:    heparin  Infusion.: 56 mL    sodium chloride 0.9%.: 320 mL    Solution: 600 mL  Total IN: 976 mL    OUT:    Indwelling Catheter - Urethral: 620 mL  Total OUT: 620 mL    Total NET: 356 mL        I&O's Summary    30 Nov 2017 07:01  -  01 Dec 2017 07:00  --------------------------------------------------------  IN: 976 mL / OUT: 620 mL / NET: 356 mL      PHYSICAL EXAM:  General: elderly woman   HEENT: Head; normocephalic, atraumatic, sclera anicteric, MMM  Neck: Supple  CARDIOVASCULAR: Normal S1 and S2, 2/6 murmur, no rub, gallop or lift.   LUNGS: No rales, rhonchi or wheeze. Normal breath sounds bilaterally.  ABDOMEN: Soft, dressing in place with wound vac   EXTREMITIES: No clubbing, cyanosis or edema. Distal pulses wnl.   SKIN: warm and dry with normal turgor.  NEURO: non-verbal         LABS:                        9.7    10.1  )-----------( 442      ( 01 Dec 2017 05:53 )             31.1     12-01    141  |  98  |  31.0<H>  ----------------------------<  194<H>  3.4<L>   |  26.0  |  1.19    Ca    8.8      01 Dec 2017 05:53  Phos  4.2     12-01  Mg     1.7     12-01    TPro  6.5<L>  /  Alb  3.2<L>  /  TBili  0.5  /  DBili  x   /  AST  22  /  ALT  20  /  AlkPhos  81  11-30      PT/INR - ( 01 Dec 2017 05:53 )   PT: 15.8 sec;   INR: 1.43 ratio    PTT - ( 01 Dec 2017 05:53 )  PTT:>200.0 sec        ECG: < from: 12 Lead ECG (11.30.17 @ 19:48) >  Atrial fibrillation with a competing junctional pacemaker  Left axis deviation  Left bundle branch block    ECHO: < from: TTE Echo Complete w/Doppler (11.30.17 @ 08:15) >   1. Left ventricular ejection fraction, byvisual estimation, is 45 to 50%.   2. Mildly decreased global left ventricular systolic function.   3. Entire septum and apex are abnormal as described above.   4. Moderately increased LV wall thickness.   5. There is moderate concentric left ventricular hypertrophy.   6. Biatrial dilatation  7.Peak transaortic gradient equals 18.3 mmHg, mean transaortic gradient equals 9.5 mmHg, the calculated aortic valve area equals 1.24 cm² by the continuity equation consistent with moderate aortic stenosis.   8. Mild mitral valve regurgitation.  9. Mild to moderate mitral annular calcification.  10. Moderate tricuspid regurgitation.  11. Mild to moderate aortic regurgitation.  12. Mild to moderate aortic valve stenosis.  13. borderline pulmonary hypertension.  14. Mildly dilated pulmonary artery.      ASSESSMENT AND PLAN:  In summary, GLENNA QUINN is an 89y Female with past medical history significant for atrial fibrillation, ILD, DM, HFpEF who presents with c/o progressively enlarging abd girth over 1 yr, s/p colonoscopy 10/19/17, found to have tubular adenoma at ileocecal valve and villous adenoma with high grade dysplasia/intramucosa carcinoma s/p colectomy now found to have mildly reduced LVEF and acute aphasia/hemiparesis concerning for acute CVA     Suspected CVA  - pending MRI  - neurology following     Atrial fibrillation ([id number])  - head CT without evidence of bleed, would continue anticoagulation     HFpEF  - no evidence of acute decompensation; hold torsemide for now   - valvular heart disease follow-up as outpatient     Thank you for allowing [facility] to participate in the care of this patient.  Please feel free to call with any questions or concerns.

## 2017-12-01 NOTE — PROGRESS NOTE ADULT - SUBJECTIVE AND OBJECTIVE BOX
Long discussion with Shayy Granddaughter and Daughter Apryl (nurse) aboutCVA  Respiratory alkalosis and poor status family only wants intubation if patient is struggling family understands poor prognosis but wants a family meeting before DNR and DNI is decided.  The family will have the patient breath in a bag intermittently to correct alkalosis as a last effort before intubation.  Long discussion about palliative care. Long discussion with Shayy Granddaughter and Daughter Apryl (nurse) aboutCVA  Respiratory and metabolic alkalosis (multifactorial COPD, Diuresis & hyperventilation) and poor status family only wants intubation if patient is struggling family understands poor prognosis but wants a family meeting before DNR and DNI is decided.  The family will have the patient breath in a bag intermittently to correct alkalosis as a last effort before intubation.  Long discussion about palliative care.    Spoke with Dr. Oliveira will also administer ativan to suppress respiratory rate.

## 2017-12-01 NOTE — PROGRESS NOTE ADULT - SUBJECTIVE AND OBJECTIVE BOX
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 Pt denies Nausea/vomiting, diarrhea. C/o constipation. Denies fever/chills.    Pt direct admission for operative intervention by Dr Flowers on 11/20.    Pt c/o SOB/dyspnea and mild vague chest discomfort upon admission to the floor, also c/o tender inner left lower leg, also c/o LE edema L > R and coldness of LE's    Pulmonary consult - Dr Bang,    Primary care consult - Dr Barbosa    Cardiology consult - Dr Bush (16 Nov 2017 15:13)     Allergies    Cipro (Unknown)  contrast media (iodine-based) (Unknown)  penicillins (Unknown)  shellfish (Unknown)  Valium (Unknown)    Intolerances      Renal insufficiency  Malignant neoplasm of descending colon  Microcytic anemia  ILD (interstitial lung disease)  Tricuspid valve insufficiency, unspecified etiology  Mitral valve insufficiency, unspecified etiology  Aortic valve insufficiency, etiology of cardiac valve disease unspecified  IDDM (insulin dependent diabetes mellitus)  COPD, mild  Atrial fibrillation, unspecified type  Diabetes  Cardiomegaly  Congestive heart failure, unspecified congestive heart failure chronicity, unspecified congestive heart failure type    MEDICATIONS  (STANDING):  ALBUTerol/ipratropium for Nebulization 3 milliLiter(s) Nebulizer every 6 hours  alvimopan 12 milliGRAM(s) Oral two times a day  amylase/lipase/protease  (CREON  6,000 Units) 1 Capsule(s) Oral three times a day with meals  ascorbic acid 500 milliGRAM(s) Oral daily  calcium carbonate 1250 mG + Vitamin D (OsCal 500 + D) 1 Tablet(s) Oral two times a day  dextrose 5%. 1000 milliLiter(s) (50 mL/Hr) IV Continuous <Continuous>  dextrose 50% Injectable 12.5 Gram(s) IV Push once  dextrose 50% Injectable 25 Gram(s) IV Push once  dextrose 50% Injectable 25 Gram(s) IV Push once  heparin  Infusion.  Unit(s)/Hr (7 mL/Hr) IV Continuous <Continuous>  insulin lispro (HumaLOG) corrective regimen sliding scale   SubCutaneous every 6 hours  lidocaine   Patch 1 Patch Transdermal daily  LORazepam   Injectable 0.5 milliGRAM(s) IntraMuscular once  methimazole 2.5 milliGRAM(s) Oral <User Schedule>  mirtazapine 15 milliGRAM(s) Oral at bedtime  sodium chloride 0.9%. 1000 milliLiter(s) (60 mL/Hr) IV Continuous <Continuous>    MEDICATIONS  (PRN):  ALBUTerol    0.083% 2.5 milliGRAM(s) Nebulizer every 3 hours PRN Bronchospasm  ALPRAZolam 0.125 milliGRAM(s) Oral two times a day PRN anxiety  dextrose Gel 1 Dose(s) Oral once PRN Blood Glucose LESS THAN 70 milliGRAM(s)/deciliter  glucagon  Injectable 1 milliGRAM(s) IntraMuscular once PRN Glucose LESS THAN 70 milligrams/deciliter  magnesium sulfate  IVPB 2 Gram(s) IV Intermittent once PRN magnesium <1.8  metoprolol    tartrate Injectable 5 milliGRAM(s) IV Push every 6 hours PRN For HR above 100  ondansetron Injectable 4 milliGRAM(s) IV Push every 6 hours PRN Nausea                           9.7    10.1  )-----------( 442      ( 01 Dec 2017 05:53 )             31.1     12-01    141  |  98  |  31.0<H>  ----------------------------<  194<H>  3.4<L>   |  26.0  |  1.19    Ca    8.8      01 Dec 2017 05:53  Phos  4.2     12-01  Mg     1.7     12-01    TPro  6.5<L>  /  Alb  3.2<L>  /  TBili  0.5  /  DBili  x   /  AST  22  /  ALT  20  /  AlkPhos  81  11-30    PT/INR - ( 01 Dec 2017 05:53 )   PT: 15.8 sec;   INR: 1.43 ratio         PTT - ( 01 Dec 2017 14:09 )  PTT:27.0 sec  ;  Vital Signs Last 24 Hrs  T(C): 37.1 (01 Dec 2017 16:20), Max: 37.3 (30 Nov 2017 22:49)  T(F): 98.8 (01 Dec 2017 16:20), Max: 99.1 (30 Nov 2017 22:49)  HR: 102 (01 Dec 2017 18:26) (75 - 102)  BP: 156/72 (01 Dec 2017 18:26) (135/59 - 156/72)  BP(mean): 81 (01 Dec 2017 12:54) (81 - 81)  RR: 28 (01 Dec 2017 18:26) (13 - 28)  SpO2: 94% (01 Dec 2017 18:26) (93% - 100%)  CAPILLARY BLOOD GLUCOSE  227 (30 Nov 2017 20:34)      11-30 @ 07:01 - 12-01 @ 07:00  --------------------------------------------------------  IN: 976 mL / OUT: 620 mL / NET: 356 mL    12-01 @ 07:01  -  12-01 @ 19:09  --------------------------------------------------------  IN: 776 mL / OUT: 0 mL / NET: 776 mL          Patient appears comfortable non verbal    HEENT: PEARLA  NGT  Neck: Supple +JVD  Cardio: S1 S2  TRISH Irregular  Pulm: CTA Upper Lower lobes Low course crackles with rales  Abd: Soft NT +Distension BS+, incision clean  Rectal - refused  Ext: No DCT  Skin: No Rash  Neuro: Awake Non Verbal 5/5 power follows some commands        Acute CVA -Heparin Neuro coumadin once family decides on PEG if swallowing doesnt improve.  Dysphagia - Will need NGT and Feeding in AM  Metabolic and Resp Alkalosis - Ativan Pulm   Acute Congestive heart failure -improved torsemide   COPD - increase neb frequency with cont o2  Moderate Severe- Anxiety Remerone add low dose xanax  agrees (will watch for paradoxical reaction)  Malignant neoplasm of descending colon - Pain control await bowel fxn, NGT- for decompression  Renal insufficiency - Nephology following   Microcytic anemia - secondary to age and colon cancer  ILD (interstitial lung disease) - Pulm  Multivalvular Dz - Cardiology  IDDM (insulin dependent diabetes mellitus) -  Lantus tonight and SS  Atrial fibrillation - Rate controlled

## 2017-12-01 NOTE — PROGRESS NOTE ADULT - SUBJECTIVE AND OBJECTIVE BOX
NEPHROLOGY INTERVAL HPI/OVERNIGHT EVENTS: No new events this am. In bed supine on nasal 02, in no apparent  distress    MEDICATIONS  (STANDING):  ALBUTerol/ipratropium for Nebulization 3 milliLiter(s) Nebulizer every 6 hours  alvimopan 12 milliGRAM(s) Oral two times a day  amylase/lipase/protease  (CREON  6,000 Units) 1 Capsule(s) Oral three times a day with meals  ascorbic acid 500 milliGRAM(s) Oral daily  calcium carbonate 1250 mG + Vitamin D (OsCal 500 + D) 1 Tablet(s) Oral two times a day  dextrose 5%. 1000 milliLiter(s) (50 mL/Hr) IV Continuous <Continuous>  dextrose 50% Injectable 12.5 Gram(s) IV Push once  dextrose 50% Injectable 25 Gram(s) IV Push once  dextrose 50% Injectable 25 Gram(s) IV Push once  heparin  Infusion.  Unit(s)/Hr (7 mL/Hr) IV Continuous <Continuous>  insulin lispro (HumaLOG) corrective regimen sliding scale   SubCutaneous every 6 hours  lidocaine   Patch 1 Patch Transdermal daily  LORazepam   Injectable 0.5 milliGRAM(s) IntraMuscular once  methimazole 2.5 milliGRAM(s) Oral <User Schedule>  mirtazapine 15 milliGRAM(s) Oral at bedtime  sodium chloride 0.9%. 1000 milliLiter(s) (80 mL/Hr) IV Continuous <Continuous>    MEDICATIONS  (PRN):  ALBUTerol    0.083% 2.5 milliGRAM(s) Nebulizer every 3 hours PRN Bronchospasm  ALPRAZolam 0.125 milliGRAM(s) Oral two times a day PRN anxiety  dextrose Gel 1 Dose(s) Oral once PRN Blood Glucose LESS THAN 70 milliGRAM(s)/deciliter  glucagon  Injectable 1 milliGRAM(s) IntraMuscular once PRN Glucose LESS THAN 70 milligrams/deciliter  magnesium sulfate  IVPB 2 Gram(s) IV Intermittent once PRN magnesium <1.8  metoprolol    tartrate Injectable 5 milliGRAM(s) IV Push every 6 hours PRN For HR above 100  ondansetron Injectable 4 milliGRAM(s) IV Push every 6 hours PRN Nausea      Allergies    Cipro (Unknown)  contrast media (iodine-based) (Unknown)  penicillins (Unknown)  shellfish (Unknown)  Valium (Unknown)    Intolerances        Vital Signs Last 24 Hrs  T(C): 36.8 (01 Dec 2017 08:20), Max: 37.3 (2017 22:49)  T(F): 98.3 (01 Dec 2017 08:20), Max: 99.1 (2017 22:49)  HR: 75 (01 Dec 2017 04:31) (75 - 89)  BP: 143/66 (01 Dec 2017 04:31) (128/68 - 151/72)  BP(mean): --  RR: 22 (01 Dec 2017 04:31) (13 - 25)  SpO2: 100% (01 Dec 2017 04:31) (95% - 100%)  Daily     Daily Weight in k.5 (01 Dec 2017 04:52)    PHYSICAL EXAM:    GENERAL: appears chronically ill  HEAD:  wnl  EYES:   ENMT:   NECK: veins wnl  NERVOUS SYSTEM:  same  CHEST/LUNG: nasal 02, no wheezes, no rapid respiration  HEART: regular rate  ABDOMEN: soft  EXTREMITIES: lower legs with compression devices   LYMPH:   SKIN: no rash    LABS:                        9.7    10.1  )-----------( 442      ( 01 Dec 2017 05:53 )             31.1         141  |  98  |  31.0<H>  ----------------------------<  194<H>  3.4<L>   |  26.0  |  1.19    Ca    8.8      01 Dec 2017 05:53  Phos  4.2       Mg     1.7         TPro  6.5<L>  /  Alb  3.2<L>  /  TBili  0.5  /  DBili  x   /  AST  22  /  ALT  20  /  AlkPhos  81  11-30    PT/INR - ( 01 Dec 2017 05:53 )   PT: 15.8 sec;   INR: 1.43 ratio         PTT - ( 01 Dec 2017 05:53 )  PTT:>200.0 sec    Magnesium, Serum: 1.7 mg/dL ( @ 05:53)  Phosphorus Level, Serum: 4.2 mg/dL ( @ 05:53)  Magnesium, Serum: 1.9 mg/dL ( @ 00:58)    ABG - ( 01 Dec 2017 04:31 )  pH: 7.57  /  pCO2: 32    /  pO2: 83    / HCO3: 31    / Base Excess: x     /  SaO2: 97                    RADIOLOGY & ADDITIONAL TESTS:

## 2017-12-01 NOTE — SWALLOW BEDSIDE ASSESSMENT ADULT - SLP GENERAL OBSERVATIONS
Pt received & seen seated upright in bed, +awake, +poor command following, +aphasic, +O2 nasal canula, +spouse present

## 2017-12-01 NOTE — PROGRESS NOTE ADULT - SUBJECTIVE AND OBJECTIVE BOX
INTERVAL HPI/OVERNIGHT EVENTS/SUBJECTIVE: 89yo female examined at bedside, pt was code stroke overnight, nurse stated that pt was aphasic as well as not following commands. Pt is unable to communicate at this time, appears to be comfortable NAD. Pt is now NPO as per code stroke protocol. Dr Carrera Neurology is following.     ICU Vital Signs Last 24 Hrs  T(C): 36.8 (01 Dec 2017 08:20), Max: 37.3 (30 Nov 2017 22:49)  T(F): 98.3 (01 Dec 2017 08:20), Max: 99.1 (30 Nov 2017 22:49)  HR: 75 (01 Dec 2017 04:31) (75 - 89)  BP: 143/66 (01 Dec 2017 04:31) (134/70 - 151/72)  RR: 22 (01 Dec 2017 04:31) (13 - 25)  SpO2: 100% (01 Dec 2017 04:31) (95% - 100%)      I&O's Detail    30 Nov 2017 07:01  -  01 Dec 2017 07:00  --------------------------------------------------------  IN:    heparin  Infusion.: 56 mL    sodium chloride 0.9%.: 320 mL    Solution: 600 mL  Total IN: 976 mL    OUT:    Indwelling Catheter - Urethral: 620 mL  Total OUT: 620 mL    Total NET: 356 mL            ABG - ( 01 Dec 2017 04:31 )  pH: 7.57  /  pCO2: 32    /  pO2: 83    / HCO3: 31    / Base Excess: x     /  SaO2: 97                  MEDICATIONS  (STANDING):  ALBUTerol/ipratropium for Nebulization 3 milliLiter(s) Nebulizer every 6 hours  alvimopan 12 milliGRAM(s) Oral two times a day  amylase/lipase/protease  (CREON  6,000 Units) 1 Capsule(s) Oral three times a day with meals  ascorbic acid 500 milliGRAM(s) Oral daily  calcium carbonate 1250 mG + Vitamin D (OsCal 500 + D) 1 Tablet(s) Oral two times a day  dextrose 5%. 1000 milliLiter(s) (50 mL/Hr) IV Continuous <Continuous>  dextrose 50% Injectable 12.5 Gram(s) IV Push once  dextrose 50% Injectable 25 Gram(s) IV Push once  dextrose 50% Injectable 25 Gram(s) IV Push once  heparin  Infusion.  Unit(s)/Hr (7 mL/Hr) IV Continuous <Continuous>  insulin lispro (HumaLOG) corrective regimen sliding scale   SubCutaneous every 6 hours  lidocaine   Patch 1 Patch Transdermal daily  LORazepam   Injectable 0.5 milliGRAM(s) IntraMuscular once  methimazole 2.5 milliGRAM(s) Oral <User Schedule>  mirtazapine 15 milliGRAM(s) Oral at bedtime  sodium chloride 0.9%. 1000 milliLiter(s) (60 mL/Hr) IV Continuous <Continuous>    MEDICATIONS  (PRN):  ALBUTerol    0.083% 2.5 milliGRAM(s) Nebulizer every 3 hours PRN Bronchospasm  ALPRAZolam 0.125 milliGRAM(s) Oral two times a day PRN anxiety  dextrose Gel 1 Dose(s) Oral once PRN Blood Glucose LESS THAN 70 milliGRAM(s)/deciliter  glucagon  Injectable 1 milliGRAM(s) IntraMuscular once PRN Glucose LESS THAN 70 milligrams/deciliter  magnesium sulfate  IVPB 2 Gram(s) IV Intermittent once PRN magnesium <1.8  metoprolol    tartrate Injectable 5 milliGRAM(s) IV Push every 6 hours PRN For HR above 100  ondansetron Injectable 4 milliGRAM(s) IV Push every 6 hours PRN Nausea      NUTRITION/IVF: NPO    PHYSICAL EXAM:    Gen: NAD    Neurological: Awake, non-verbal, purposeful movements, aphasia    ENMT: Nares patent w/o discharge, MMM, no LAD     Neck:  Trachea midline, No JVD    Pulmonary: CTA BL, Non labored, chest rise is  symmetrical with respiration    Cardiovascular: S1S2 RRR    Gastrointestinal: Abdomen soft and non tympanic, NTND, +BS x4, prevena in place with good seal, port site dressing CDI,     Genitourinary: Diaper in place    Extremities: No gross deformities or angulations    Skin: Warm and dry, no rash.     Musculoskeletal: unable to access due to pt mental status.             LABS:  CBC Full  -  ( 01 Dec 2017 05:53 )  WBC Count : 10.1 K/uL  Hemoglobin : 9.7 g/dL  Hematocrit : 31.1 %  Platelet Count - Automated : 442 K/uL  Mean Cell Volume : 70.4 fl  Mean Cell Hemoglobin : 21.9 pg  Mean Cell Hemoglobin Concentration : 31.2 g/dL    12-01    141  |  98  |  31.0<H>  ----------------------------<  194<H>  3.4<L>   |  26.0  |  1.19    Ca    8.8      01 Dec 2017 05:53  Phos  4.2     12-01  Mg     1.7     12-01    TPro  6.5<L>  /  Alb  3.2<L>  /  TBili  0.5  /  DBili  x   /  AST  22  /  ALT  20  /  AlkPhos  81  11-30    PT/INR - ( 01 Dec 2017 05:53 )   PT: 15.8 sec;   INR: 1.43 ratio         PTT - ( 01 Dec 2017 05:53 )  PTT:>200.0 sec    RECENT CULTURES:      LIVER FUNCTIONS - ( 30 Nov 2017 20:41 )  Alb: 3.2 g/dL / Pro: 6.5 g/dL / ALK PHOS: 81 U/L / ALT: 20 U/L / AST: 22 U/L / GGT: x               CAPILLARY BLOOD GLUCOSE  227 (30 Nov 2017 20:34)      RADIOLOGY & ADDITIONAL STUDIES:    ASSESSMENT/PLAN:  89yFemale presenting with:  Neuro: Neurovascular checks every 2hrs; Pain control as prescribed, f/u MRI   Cardio: Monitor Vital signs; Continue home meds as per primary team  Pulm: Breathing Independently  GI: Diet: [ NPO]; Monitor bowel function  : Urinating independently, diaper for incontinence   DVT ppx: Heparin drip- as per normogram   As per Dr. Edmond f/u with family meeting  Dobhoff placement for tube feeding after conformation of BM

## 2017-12-01 NOTE — SWALLOW BEDSIDE ASSESSMENT ADULT - ORAL PHASE
absent oral management skills with no attempt to manipulate puree bolus upon intraoral placement: removed by

## 2017-12-01 NOTE — SWALLOW BEDSIDE ASSESSMENT ADULT - ASR SWALLOW ASPIRATION MONITOR
fever/pneumonia/throat clearing/upper respiratory infection/cough/oral hygiene/change of breathing pattern/position upright (89Y)/gurgly voice

## 2017-12-02 DIAGNOSIS — I63.9 CEREBRAL INFARCTION, UNSPECIFIED: ICD-10-CM

## 2017-12-02 DIAGNOSIS — G92 TOXIC ENCEPHALOPATHY: ICD-10-CM

## 2017-12-02 LAB
ANION GAP SERPL CALC-SCNC: 13 MMOL/L — SIGNIFICANT CHANGE UP (ref 5–17)
APTT BLD: 30.5 SEC — SIGNIFICANT CHANGE UP (ref 27.5–37.4)
APTT BLD: 36.9 SEC — SIGNIFICANT CHANGE UP (ref 27.5–37.4)
BASE EXCESS BLDA CALC-SCNC: 4.7 MMOL/L — HIGH (ref -2–2)
BLOOD GAS COMMENTS ARTERIAL: SIGNIFICANT CHANGE UP
BUN SERPL-MCNC: 25 MG/DL — HIGH (ref 8–20)
CALCIUM SERPL-MCNC: 8.4 MG/DL — LOW (ref 8.6–10.2)
CHLORIDE SERPL-SCNC: 106 MMOL/L — SIGNIFICANT CHANGE UP (ref 98–107)
CO2 SERPL-SCNC: 27 MMOL/L — SIGNIFICANT CHANGE UP (ref 22–29)
CREAT SERPL-MCNC: 0.97 MG/DL — SIGNIFICANT CHANGE UP (ref 0.5–1.3)
GAS PNL BLDA: SIGNIFICANT CHANGE UP
GLUCOSE BLDC GLUCOMTR-MCNC: 104 MG/DL — HIGH (ref 70–99)
GLUCOSE BLDC GLUCOMTR-MCNC: 130 MG/DL — HIGH (ref 70–99)
GLUCOSE BLDC GLUCOMTR-MCNC: 154 MG/DL — HIGH (ref 70–99)
GLUCOSE BLDC GLUCOMTR-MCNC: 169 MG/DL — HIGH (ref 70–99)
GLUCOSE SERPL-MCNC: 139 MG/DL — HIGH (ref 70–115)
HCO3 BLDA-SCNC: 29 MMOL/L — HIGH (ref 20–26)
HCT VFR BLD CALC: 30.7 % — LOW (ref 37–47)
HGB BLD-MCNC: 9.3 G/DL — LOW (ref 12–16)
HOROWITZ INDEX BLDA+IHG-RTO: SIGNIFICANT CHANGE UP
MAGNESIUM SERPL-MCNC: 1.8 MG/DL — SIGNIFICANT CHANGE UP (ref 1.8–2.6)
MCHC RBC-ENTMCNC: 21.8 PG — LOW (ref 27–31)
MCHC RBC-ENTMCNC: 30.3 G/DL — LOW (ref 32–36)
MCV RBC AUTO: 71.9 FL — LOW (ref 81–99)
PCO2 BLDA: 42 MMHG — SIGNIFICANT CHANGE UP (ref 35–45)
PH BLDA: 7.45 — SIGNIFICANT CHANGE UP (ref 7.35–7.45)
PHOSPHATE SERPL-MCNC: 3.4 MG/DL — SIGNIFICANT CHANGE UP (ref 2.4–4.7)
PLATELET # BLD AUTO: 353 K/UL — SIGNIFICANT CHANGE UP (ref 150–400)
PO2 BLDA: 96 MMHG — SIGNIFICANT CHANGE UP (ref 83–108)
POTASSIUM SERPL-MCNC: 3.7 MMOL/L — SIGNIFICANT CHANGE UP (ref 3.5–5.3)
POTASSIUM SERPL-SCNC: 3.7 MMOL/L — SIGNIFICANT CHANGE UP (ref 3.5–5.3)
RBC # BLD: 4.27 M/UL — LOW (ref 4.4–5.2)
RBC # FLD: 22.2 % — HIGH (ref 11–15.6)
SAO2 % BLDA: 98 % — SIGNIFICANT CHANGE UP (ref 95–99)
SODIUM SERPL-SCNC: 146 MMOL/L — HIGH (ref 135–145)
WBC # BLD: 10.9 K/UL — HIGH (ref 4.8–10.8)
WBC # FLD AUTO: 10.9 K/UL — HIGH (ref 4.8–10.8)

## 2017-12-02 PROCEDURE — 71010: CPT | Mod: 26

## 2017-12-02 PROCEDURE — 74176 CT ABD & PELVIS W/O CONTRAST: CPT | Mod: 26

## 2017-12-02 RX ORDER — DEXTROSE MONOHYDRATE, SODIUM CHLORIDE, AND POTASSIUM CHLORIDE 50; .745; 4.5 G/1000ML; G/1000ML; G/1000ML
1000 INJECTION, SOLUTION INTRAVENOUS
Qty: 0 | Refills: 0 | Status: DISCONTINUED | OUTPATIENT
Start: 2017-12-02 | End: 2017-12-02

## 2017-12-02 RX ORDER — FUROSEMIDE 40 MG
40 TABLET ORAL ONCE
Qty: 0 | Refills: 0 | Status: COMPLETED | OUTPATIENT
Start: 2017-12-02 | End: 2017-12-02

## 2017-12-02 RX ORDER — MORPHINE SULFATE 50 MG/1
2 CAPSULE, EXTENDED RELEASE ORAL EVERY 6 HOURS
Qty: 0 | Refills: 0 | Status: DISCONTINUED | OUTPATIENT
Start: 2017-12-02 | End: 2017-12-06

## 2017-12-02 RX ORDER — MORPHINE SULFATE 50 MG/1
2 CAPSULE, EXTENDED RELEASE ORAL ONCE
Qty: 0 | Refills: 0 | Status: DISCONTINUED | OUTPATIENT
Start: 2017-12-02 | End: 2017-12-02

## 2017-12-02 RX ADMIN — Medication 1 TABLET(S): at 05:59

## 2017-12-02 RX ADMIN — MORPHINE SULFATE 2 MILLIGRAM(S): 50 CAPSULE, EXTENDED RELEASE ORAL at 16:10

## 2017-12-02 RX ADMIN — ALVIMOPAN 12 MILLIGRAM(S): 12 CAPSULE ORAL at 05:59

## 2017-12-02 RX ADMIN — LIDOCAINE 1 PATCH: 4 CREAM TOPICAL at 08:00

## 2017-12-02 RX ADMIN — Medication 5 MILLIGRAM(S): at 10:24

## 2017-12-02 RX ADMIN — MIRTAZAPINE 15 MILLIGRAM(S): 45 TABLET, ORALLY DISINTEGRATING ORAL at 21:19

## 2017-12-02 RX ADMIN — Medication 10 MILLIGRAM(S): at 21:19

## 2017-12-02 RX ADMIN — LIDOCAINE 1 PATCH: 4 CREAM TOPICAL at 17:32

## 2017-12-02 RX ADMIN — Medication 2: at 17:35

## 2017-12-02 RX ADMIN — MORPHINE SULFATE 2 MILLIGRAM(S): 50 CAPSULE, EXTENDED RELEASE ORAL at 21:19

## 2017-12-02 RX ADMIN — Medication 40 MILLIGRAM(S): at 14:41

## 2017-12-02 RX ADMIN — MORPHINE SULFATE 2 MILLIGRAM(S): 50 CAPSULE, EXTENDED RELEASE ORAL at 12:00

## 2017-12-02 RX ADMIN — DEXTROSE MONOHYDRATE, SODIUM CHLORIDE, AND POTASSIUM CHLORIDE 60 MILLILITER(S): 50; .745; 4.5 INJECTION, SOLUTION INTRAVENOUS at 09:41

## 2017-12-02 RX ADMIN — HEPARIN SODIUM 800 UNIT(S)/HR: 5000 INJECTION INTRAVENOUS; SUBCUTANEOUS at 09:18

## 2017-12-02 RX ADMIN — MORPHINE SULFATE 2 MILLIGRAM(S): 50 CAPSULE, EXTENDED RELEASE ORAL at 21:34

## 2017-12-02 RX ADMIN — Medication 2: at 12:20

## 2017-12-02 RX ADMIN — Medication 3 MILLILITER(S): at 08:17

## 2017-12-02 RX ADMIN — MORPHINE SULFATE 2 MILLIGRAM(S): 50 CAPSULE, EXTENDED RELEASE ORAL at 11:39

## 2017-12-02 RX ADMIN — Medication 3 MILLILITER(S): at 20:32

## 2017-12-02 RX ADMIN — MORPHINE SULFATE 2 MILLIGRAM(S): 50 CAPSULE, EXTENDED RELEASE ORAL at 15:42

## 2017-12-02 RX ADMIN — HEPARIN SODIUM 900 UNIT(S)/HR: 5000 INJECTION INTRAVENOUS; SUBCUTANEOUS at 17:31

## 2017-12-02 RX ADMIN — Medication 5 MILLIGRAM(S): at 03:28

## 2017-12-02 RX ADMIN — Medication 3 MILLILITER(S): at 03:12

## 2017-12-02 RX ADMIN — MIRTAZAPINE 15 MILLIGRAM(S): 45 TABLET, ORALLY DISINTEGRATING ORAL at 00:17

## 2017-12-02 NOTE — PROGRESS NOTE ADULT - ASSESSMENT
post colon resection for mass and obstruction. post CVA. improving. hard to asses GI status given aphasia.  will get CT scan of abdomen.  if normal then start tube feeds.

## 2017-12-02 NOTE — PROGRESS NOTE ADULT - SUBJECTIVE AND OBJECTIVE BOX
GLENNA QUINN    [id number]    89y      Female    INTERVAL HPI/OVERNIGHT EVENTS:   episodes of ongoing agitation requiring restraints of arms to avoid removal NG tube; no other new symptoms reported.      Vital Signs Last 24 Hrs  T(C): 36.8 (02 Dec 2017 07:57), Max: 37.7 (01 Dec 2017 20:49)  T(F): 98.3 (02 Dec 2017 07:57), Max: 99.9 (01 Dec 2017 20:49)  HR: 112 (02 Dec 2017 08:19) (80 - 119)  BP: 145/95 (02 Dec 2017 07:21) (136/62 - 159/75)  BP(mean): 81 (01 Dec 2017 12:54) (81 - 81)  RR: 24 (02 Dec 2017 07:21) (22 - 28)  SpO2: 100% (02 Dec 2017 08:19) (93% - 100%)    PHYSICAL EXAM:    GENERAL: restrained and easily agitated by stimulation  NECK: no meningisumus   NEURO: lethargic but agitated with stimulation, limited verbalization without accurately following commands/naming or repeating. no response to visual threat. no visual fixation or tracking. Moves upper extremities symmetrically with paratonia. no myoclonus.    LABS:                        9.3    10.9  )-----------( 353      ( 02 Dec 2017 08:25 )             30.7     12-02    146<H>  |  106  |  25.0<H>  ----------------------------<  139<H>  3.7   |  27.0  |  0.97    Ca    8.4<L>      02 Dec 2017 08:39  Phos  3.4     12-02  Mg     1.8     12-02    TPro  6.5<L>  /  Alb  3.2<L>  /  TBili  0.5  /  DBili  x   /  AST  22  /  ALT  20  /  AlkPhos  81  11-30    PT/INR - ( 01 Dec 2017 05:53 )   PT: 15.8 sec;   INR: 1.43 ratio         PTT - ( 02 Dec 2017 08:25 )  PTT:30.5 sec        MEDICATIONS  (STANDING):  ALBUTerol/ipratropium for Nebulization 3 milliLiter(s) Nebulizer every 6 hours  alvimopan 12 milliGRAM(s) Oral two times a day  amylase/lipase/protease  (CREON  6,000 Units) 1 Capsule(s) Oral three times a day with meals  ascorbic acid 500 milliGRAM(s) Oral daily  calcium carbonate 1250 mG + Vitamin D (OsCal 500 + D) 1 Tablet(s) Oral two times a day  dextrose 5%. 1000 milliLiter(s) (50 mL/Hr) IV Continuous <Continuous>  dextrose 50% Injectable 12.5 Gram(s) IV Push once  dextrose 50% Injectable 25 Gram(s) IV Push once  dextrose 50% Injectable 25 Gram(s) IV Push once  heparin  Infusion.  Unit(s)/Hr (7 mL/Hr) IV Continuous <Continuous>  insulin lispro (HumaLOG) corrective regimen sliding scale   SubCutaneous every 6 hours  lidocaine   Patch 1 Patch Transdermal daily  methimazole 2.5 milliGRAM(s) Oral <User Schedule>  mirtazapine 15 milliGRAM(s) Oral at bedtime  sodium chloride 0.45% with potassium chloride 20 mEq/L 1000 milliLiter(s) (60 mL/Hr) IV Continuous <Continuous>    MEDICATIONS  (PRN):  ALBUTerol    0.083% 2.5 milliGRAM(s) Nebulizer every 3 hours PRN Bronchospasm  dextrose Gel 1 Dose(s) Oral once PRN Blood Glucose LESS THAN 70 milliGRAM(s)/deciliter  glucagon  Injectable 1 milliGRAM(s) IntraMuscular once PRN Glucose LESS THAN 70 milligrams/deciliter  magnesium sulfate  IVPB 2 Gram(s) IV Intermittent once PRN magnesium <1.8  metoprolol    tartrate Injectable 5 milliGRAM(s) IV Push every 6 hours PRN For HR above 100  morphine  - Injectable 2 milliGRAM(s) IV Push every 12 hours PRN Tachypnea  ondansetron Injectable 4 milliGRAM(s) IV Push every 6 hours PRN Nausea      RADIOLOGY & ADDITIONAL TESTS:  CT brain - no acute changes identified.  MRI brain & Carotid Duplex pending -

## 2017-12-02 NOTE — PROGRESS NOTE ADULT - SUBJECTIVE AND OBJECTIVE BOX
NEPHROLOGY INTERVAL HPI/OVERNIGHT EVENTS: No new events this am. In bed  on nasal 02, in no apparent  distress    MEDICATIONS  (STANDING):  ALBUTerol/ipratropium for Nebulization 3 milliLiter(s) Nebulizer every 6 hours  alvimopan 12 milliGRAM(s) Oral two times a day  amylase/lipase/protease  (CREON  6,000 Units) 1 Capsule(s) Oral three times a day with meals  ascorbic acid 500 milliGRAM(s) Oral daily  calcium carbonate 1250 mG + Vitamin D (OsCal 500 + D) 1 Tablet(s) Oral two times a day  dextrose 5%. 1000 milliLiter(s) (50 mL/Hr) IV Continuous <Continuous>  dextrose 50% Injectable 12.5 Gram(s) IV Push once  dextrose 50% Injectable 25 Gram(s) IV Push once  dextrose 50% Injectable 25 Gram(s) IV Push once  heparin  Infusion.  Unit(s)/Hr (7 mL/Hr) IV Continuous <Continuous>  insulin lispro (HumaLOG) corrective regimen sliding scale   SubCutaneous every 6 hours  lidocaine   Patch 1 Patch Transdermal daily  LORazepam   Injectable 0.5 milliGRAM(s) IntraMuscular once  methimazole 2.5 milliGRAM(s) Oral <User Schedule>  mirtazapine 15 milliGRAM(s) Oral at bedtime  sodium chloride 0.9%. 1000 milliLiter(s) (80 mL/Hr) IV Continuous <Continuous>    MEDICATIONS  (PRN):  ALBUTerol    0.083% 2.5 milliGRAM(s) Nebulizer every 3 hours PRN Bronchospasm  ALPRAZolam 0.125 milliGRAM(s) Oral two times a day PRN anxiety  dextrose Gel 1 Dose(s) Oral once PRN Blood Glucose LESS THAN 70 milliGRAM(s)/deciliter  glucagon  Injectable 1 milliGRAM(s) IntraMuscular once PRN Glucose LESS THAN 70 milligrams/deciliter  magnesium sulfate  IVPB 2 Gram(s) IV Intermittent once PRN magnesium <1.8  metoprolol    tartrate Injectable 5 milliGRAM(s) IV Push every 6 hours PRN For HR above 100  ondansetron Injectable 4 milliGRAM(s) IV Push every 6 hours PRN Nausea      Allergies    Cipro (Unknown)  contrast media (iodine-based) (Unknown)  penicillins (Unknown)  shellfish (Unknown)  Valium (Unknown)    Intolerances        Vital Signs Last 24 Hrs    T(C): 36.2 (02 Dec 2017 05:06), Max: 37.7 (01 Dec 2017 20:49)  T(F): 97.2 (02 Dec 2017 05:06), Max: 99.9 (01 Dec 2017 20:49)  HR: 91 (02 Dec 2017 04:22) (80 - 119)  BP: 144/68 (02 Dec 2017 04:22) (136/62 - 159/75)  BP(mean): 81 (01 Dec 2017 12:54) (81 - 81)  RR: 22 (02 Dec 2017 04:22) (22 - 28)  SpO2: 96% (02 Dec 2017 04:22) (93% - 100%)  T(C): 36.8 (01 Dec 2017 08:20), Max: 37.3 (2017 22:49)  T(F): 98.3 (01 Dec 2017 08:20), Max: 99.1 (2017 22:49)  HR: 75 (01 Dec 2017 04:31) (75 - 89)  BP: 143/66 (01 Dec 2017 04:31) (128/68 - 151/72)  BP(mean): --  RR: 22 (01 Dec 2017 04:31) (13 - 25)  SpO2: 100% (01 Dec 2017 04:31) (95% - 100%)  Daily     Daily Weight in k.5 (01 Dec 2017 04:52)    PHYSICAL EXAM:    GENERAL: appears chronically ill  HEAD:  wnl  EYES:   ENMT:   NECK: veins wnl  NERVOUS SYSTEM:  same  CHEST/LUNG: nasal 02, no wheezes, no rapid respiration  HEART: regular rate  ABDOMEN: soft, wound with vac noted  EXTREMITIES: lower legs with compression devices   LYMPH:   SKIN: no rash   neg  LABS:                        9.7    10.1  )-----------( 442      ( 01 Dec 2017 05:53 )             31.1     12    141  |  98  |  31.0<H>  ----------------------------<  194<H>  3.4<L>   |  26.0  |  1.19    Ca    8.8      01 Dec 2017 05:53  Phos  4.2       Mg     1.7     12    TPro  6.5<L>  /  Alb  3.2<L>  /  TBili  0.5  /  DBili  x   /  AST  22  /  ALT  20  /  AlkPhos  81  11-30    PT/INR - ( 01 Dec 2017 05:53 )   PT: 15.8 sec;   INR: 1.43 ratio         PTT - ( 01 Dec 2017 05:53 )  PTT:>200.0 sec    Magnesium, Serum: 1.7 mg/dL ( @ 05:53)  Phosphorus Level, Serum: 4.2 mg/dL ( @ 05:53)  Magnesium, Serum: 1.9 mg/dL ( @ 00:58)    ABG - ( 01 Dec 2017 04:31 )  pH: 7.57  /  pCO2: 32    /  pO2: 83    / HCO3: 31    / Base Excess: x     /  SaO2: 97                    RADIOLOGY & ADDITIONAL TESTS:

## 2017-12-02 NOTE — PROGRESS NOTE ADULT - ASSESSMENT
Encephalopathy - toxic/metabolic, possible superimposed CVA all on baseline of probable underlying pre-existent dementia.    MRI pending to evaluate for possible new CVA.

## 2017-12-02 NOTE — PROGRESS NOTE ADULT - SUBJECTIVE AND OBJECTIVE BOX
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 Pt denies Nausea/vomiting, diarrhea. C/o constipation. Denies fever/chills.    Pt direct admission for operative intervention by Dr Flowers on 11/20.    Pt c/o SOB/dyspnea and mild vague chest discomfort upon admission to the floor, also c/o tender inner left lower leg, also c/o LE edema L > R and coldness of LE's    Pulmonary consult - Dr Bang,    Primary care consult - Dr Barbosa    Cardiology consult - Dr Bush (16 Nov 2017 15:13)     Allergies    Cipro (Unknown)  contrast media (iodine-based) (Unknown)  penicillins (Unknown)  shellfish (Unknown)  Valium (Unknown)    Intolerances      Renal insufficiency  Malignant neoplasm of descending colon  Microcytic anemia  ILD (interstitial lung disease)  Tricuspid valve insufficiency, unspecified etiology  Mitral valve insufficiency, unspecified etiology  Aortic valve insufficiency, etiology of cardiac valve disease unspecified  IDDM (insulin dependent diabetes mellitus)  COPD, mild  Atrial fibrillation, unspecified type  Diabetes  Cardiomegaly  Congestive heart failure, unspecified congestive heart failure chronicity, unspecified congestive heart failure type    MEDICATIONS  (STANDING):  ALBUTerol/ipratropium for Nebulization 3 milliLiter(s) Nebulizer every 6 hours  alvimopan 12 milliGRAM(s) Oral two times a day  amylase/lipase/protease  (CREON  6,000 Units) 1 Capsule(s) Oral three times a day with meals  ascorbic acid 500 milliGRAM(s) Oral daily  calcium carbonate 1250 mG + Vitamin D (OsCal 500 + D) 1 Tablet(s) Oral two times a day  dextrose 5%. 1000 milliLiter(s) (50 mL/Hr) IV Continuous <Continuous>  dextrose 50% Injectable 12.5 Gram(s) IV Push once  dextrose 50% Injectable 25 Gram(s) IV Push once  dextrose 50% Injectable 25 Gram(s) IV Push once  diltiazem    Tablet 30 milliGRAM(s) Enteral Tube every 6 hours  heparin  Infusion.  Unit(s)/Hr (7 mL/Hr) IV Continuous <Continuous>  insulin lispro (HumaLOG) corrective regimen sliding scale   SubCutaneous every 6 hours  lidocaine   Patch 1 Patch Transdermal daily  methimazole 2.5 milliGRAM(s) Oral <User Schedule>  mirtazapine 15 milliGRAM(s) Oral at bedtime  torsemide 10 milliGRAM(s) Oral daily    MEDICATIONS  (PRN):  ALBUTerol    0.083% 2.5 milliGRAM(s) Nebulizer every 3 hours PRN Bronchospasm  dextrose Gel 1 Dose(s) Oral once PRN Blood Glucose LESS THAN 70 milliGRAM(s)/deciliter  glucagon  Injectable 1 milliGRAM(s) IntraMuscular once PRN Glucose LESS THAN 70 milligrams/deciliter  magnesium sulfate  IVPB 2 Gram(s) IV Intermittent once PRN magnesium <1.8  metoprolol    tartrate Injectable 5 milliGRAM(s) IV Push every 6 hours PRN For HR above 100  morphine  - Injectable 2 milliGRAM(s) IV Push every 6 hours PRN Tachypnea  ondansetron Injectable 4 milliGRAM(s) IV Push every 6 hours PRN Nausea                           9.3    10.9  )-----------( 353      ( 02 Dec 2017 08:25 )             30.7     12-02    146<H>  |  106  |  25.0<H>  ----------------------------<  139<H>  3.7   |  27.0  |  0.97    Ca    8.4<L>      02 Dec 2017 08:39  Phos  3.4     12-02  Mg     1.8     12-02    TPro  6.5<L>  /  Alb  3.2<L>  /  TBili  0.5  /  DBili  x   /  AST  22  /  ALT  20  /  AlkPhos  81  11-30    PT/INR - ( 01 Dec 2017 05:53 )   PT: 15.8 sec;   INR: 1.43 ratio         PTT - ( 02 Dec 2017 16:27 )  PTT:36.9 sec  ;  Vital Signs Last 24 Hrs  T(C): 36.7 (02 Dec 2017 20:00), Max: 37.7 (01 Dec 2017 20:49)  T(F): 98.1 (02 Dec 2017 20:00), Max: 99.9 (01 Dec 2017 20:49)  HR: 92 (02 Dec 2017 16:01) (91 - 129)  BP: 141/71 (02 Dec 2017 16:01) (141/71 - 162/81)  BP(mean): --  RR: 21 (02 Dec 2017 16:01) (21 - 26)  SpO2: 97% (02 Dec 2017 16:01) (96% - 100%)      12-01 @ 07:01  -  12-02 @ 07:00  --------------------------------------------------------  IN: 1821 mL / OUT: 0 mL / NET: 1821 mL        Patient appears comfortable non verbal episode of SOB earlier    HEENT: PEARLA  NGT  Neck: Supple +JVD  Cardio: S1 S2  TRISH Irregular  Pulm: CTA Upper Lower lobes Low course crackles with rales  Abd: Soft NT +Distension BS+, incision clean  Rectal - refused  Ext: No DCT  Skin: No Rash  Neuro: Awake Non Verbal 5/5 power follows some commands        Acute CVA -Heparin Neuro coumadin once family decides on PEG if swallowing doesnt improve.  Dysphagia - NGT and poss Feeding if Ok with surgery CT- contrast throughout bowel  Metabolic and Resp Alkalosis - Ativan Pulm   Acute Congestive heart failure -restart  torsemide   COPD - increase neb frequency with cont o2  Malignant neoplasm of descending colon - Pain control await bowel fxn, NGT- for decompression  Renal insufficiency - Nephology following   ILD (interstitial lung disease) - Pulm  Multivalvular Dz - Cardiology  IDDM (insulin dependent diabetes mellitus) -  Lantus tonight and SS  Atrial fibrillation - Rate controlled

## 2017-12-02 NOTE — PROGRESS NOTE ADULT - SUBJECTIVE AND OBJECTIVE BOX
addendum to prior note -  MRI brain already completed - Bilateral frontal and right parietal CVA  Carotid Duplex - negative for hemodynamically significant stenosis.

## 2017-12-02 NOTE — PROVIDER CONTACT NOTE (CRITICAL VALUE NOTIFICATION) - ACTION/TREATMENT ORDERED:
Awaiting orders.
Follow Heparin monogran, Hold for 1hr then decrease heparin rate by 200 units to 500 units/hr
K-rider x 3 doses
Pt. receiving Kriders x3 doses ordered
will continue to monitor.
aware

## 2017-12-02 NOTE — PROGRESS NOTE ADULT - ASSESSMENT
s/p op bowel for polyps, chronic anxiety disorder, respiratory alkalosis with low K.n Tachycardia earlier.

## 2017-12-02 NOTE — PROVIDER CONTACT NOTE (OTHER) - SITUATION
Abdominal distention noted, tenderness, Hold tube feedings, Hold medication unable to crush
Notified by RN that patient was found attempting to get out of bed and her heart rate was elevated to 120.

## 2017-12-02 NOTE — PROGRESS NOTE ADULT - ASSESSMENT
Stroke either embolic or low flow superimposed on toxic/metabolic/post-operative encephalopathy and underlying dementia.

## 2017-12-02 NOTE — PROGRESS NOTE ADULT - SUBJECTIVE AND OBJECTIVE BOX
patient seen and examened. she is responsive. doesnt seem in pain.  Vitals: stable  abd: soft NT                        9.3    10.9  )-----------( 353      ( 02 Dec 2017 08:25 )             30.7   12-02    146<H>  |  106  |  25.0<H>  ----------------------------<  139<H>  3.7   |  27.0  |  0.97    Ca    8.4<L>      02 Dec 2017 08:39  Phos  3.4     12-02  Mg     1.8     12-02    TPro  6.5<L>  /  Alb  3.2<L>  /  TBili  0.5  /  DBili  x   /  AST  22  /  ALT  20  /  AlkPhos  81  11-30

## 2017-12-03 LAB
ANION GAP SERPL CALC-SCNC: 17 MMOL/L — SIGNIFICANT CHANGE UP (ref 5–17)
APTT BLD: 36.7 SEC — SIGNIFICANT CHANGE UP (ref 27.5–37.4)
APTT BLD: 43.2 SEC — HIGH (ref 27.5–37.4)
APTT BLD: 51.1 SEC — HIGH (ref 27.5–37.4)
BUN SERPL-MCNC: 23 MG/DL — HIGH (ref 8–20)
CALCIUM SERPL-MCNC: 8.9 MG/DL — SIGNIFICANT CHANGE UP (ref 8.6–10.2)
CHLORIDE SERPL-SCNC: 99 MMOL/L — SIGNIFICANT CHANGE UP (ref 98–107)
CO2 SERPL-SCNC: 26 MMOL/L — SIGNIFICANT CHANGE UP (ref 22–29)
CREAT SERPL-MCNC: 1.13 MG/DL — SIGNIFICANT CHANGE UP (ref 0.5–1.3)
GLUCOSE BLDC GLUCOMTR-MCNC: 164 MG/DL — HIGH (ref 70–99)
GLUCOSE BLDC GLUCOMTR-MCNC: 207 MG/DL — HIGH (ref 70–99)
GLUCOSE BLDC GLUCOMTR-MCNC: 243 MG/DL — HIGH (ref 70–99)
GLUCOSE BLDC GLUCOMTR-MCNC: 244 MG/DL — HIGH (ref 70–99)
GLUCOSE SERPL-MCNC: 168 MG/DL — HIGH (ref 70–115)
HCT VFR BLD CALC: 32.2 % — LOW (ref 37–47)
HGB BLD-MCNC: 9.8 G/DL — LOW (ref 12–16)
INR BLD: 1.25 RATIO — HIGH (ref 0.88–1.16)
MAGNESIUM SERPL-MCNC: 1.7 MG/DL — SIGNIFICANT CHANGE UP (ref 1.6–2.6)
MCHC RBC-ENTMCNC: 22.1 PG — LOW (ref 27–31)
MCHC RBC-ENTMCNC: 30.4 G/DL — LOW (ref 32–36)
MCV RBC AUTO: 72.7 FL — LOW (ref 81–99)
PHOSPHATE SERPL-MCNC: 3.3 MG/DL — SIGNIFICANT CHANGE UP (ref 2.4–4.7)
PLATELET # BLD AUTO: 270 K/UL — SIGNIFICANT CHANGE UP (ref 150–400)
POTASSIUM SERPL-MCNC: 3.5 MMOL/L — SIGNIFICANT CHANGE UP (ref 3.5–5.3)
POTASSIUM SERPL-SCNC: 3.5 MMOL/L — SIGNIFICANT CHANGE UP (ref 3.5–5.3)
PROTHROM AB SERPL-ACNC: 13.8 SEC — HIGH (ref 9.8–12.7)
RBC # BLD: 4.43 M/UL — SIGNIFICANT CHANGE UP (ref 4.4–5.2)
RBC # FLD: 21.6 % — HIGH (ref 11–15.6)
SODIUM SERPL-SCNC: 142 MMOL/L — SIGNIFICANT CHANGE UP (ref 135–145)
WBC # BLD: 22.6 K/UL — HIGH (ref 4.8–10.8)
WBC # FLD AUTO: 22.6 K/UL — HIGH (ref 4.8–10.8)

## 2017-12-03 PROCEDURE — 71010: CPT | Mod: 26

## 2017-12-03 RX ADMIN — MIRTAZAPINE 15 MILLIGRAM(S): 45 TABLET, ORALLY DISINTEGRATING ORAL at 21:41

## 2017-12-03 RX ADMIN — Medication: at 18:00

## 2017-12-03 RX ADMIN — ALVIMOPAN 12 MILLIGRAM(S): 12 CAPSULE ORAL at 05:29

## 2017-12-03 RX ADMIN — Medication 4: at 23:05

## 2017-12-03 RX ADMIN — LIDOCAINE 1 PATCH: 4 CREAM TOPICAL at 03:45

## 2017-12-03 RX ADMIN — Medication 1 TABLET(S): at 18:00

## 2017-12-03 RX ADMIN — HEPARIN SODIUM 1000 UNIT(S)/HR: 5000 INJECTION INTRAVENOUS; SUBCUTANEOUS at 20:41

## 2017-12-03 RX ADMIN — Medication 1 TABLET(S): at 05:29

## 2017-12-03 RX ADMIN — Medication 1 CAPSULE(S): at 18:34

## 2017-12-03 RX ADMIN — Medication 2: at 06:21

## 2017-12-03 RX ADMIN — HEPARIN SODIUM 1000 UNIT(S)/HR: 5000 INJECTION INTRAVENOUS; SUBCUTANEOUS at 00:16

## 2017-12-03 RX ADMIN — Medication 5 MILLIGRAM(S): at 20:42

## 2017-12-03 RX ADMIN — Medication 5 MILLIGRAM(S): at 03:48

## 2017-12-03 RX ADMIN — Medication 10 MILLIGRAM(S): at 05:30

## 2017-12-03 RX ADMIN — Medication 3 MILLILITER(S): at 21:46

## 2017-12-03 RX ADMIN — Medication 5 MILLIGRAM(S): at 09:45

## 2017-12-03 RX ADMIN — LIDOCAINE 1 PATCH: 4 CREAM TOPICAL at 18:34

## 2017-12-03 RX ADMIN — Medication: at 14:37

## 2017-12-03 RX ADMIN — MORPHINE SULFATE 2 MILLIGRAM(S): 50 CAPSULE, EXTENDED RELEASE ORAL at 04:03

## 2017-12-03 RX ADMIN — Medication 1 CAPSULE(S): at 12:00

## 2017-12-03 RX ADMIN — MORPHINE SULFATE 2 MILLIGRAM(S): 50 CAPSULE, EXTENDED RELEASE ORAL at 03:48

## 2017-12-03 RX ADMIN — Medication 3 MILLILITER(S): at 03:22

## 2017-12-03 RX ADMIN — Medication 3 MILLILITER(S): at 15:23

## 2017-12-03 RX ADMIN — Medication 3 MILLILITER(S): at 09:41

## 2017-12-03 RX ADMIN — Medication 500 MILLIGRAM(S): at 12:00

## 2017-12-03 RX ADMIN — Medication 1 CAPSULE(S): at 09:35

## 2017-12-03 NOTE — PHYSICAL THERAPY INITIAL EVALUATION ADULT - PLANNED THERAPY INTERVENTIONS, PT EVAL
transfer training/bed mobility training/gait training/strengthening/balance training
strengthening/gait training/postural re-education/bed mobility training/transfer training/balance training

## 2017-12-03 NOTE — PHYSICAL THERAPY INITIAL EVALUATION ADULT - RANGE OF MOTION EXAMINATION, REHAB EVAL
unable to fully assess due to cognition
forward flexed/kyphotic trunk/bilateral lower extremity ROM was WFL (within functional limits)/bilateral upper extremity ROM was WFL (within functional limits)

## 2017-12-03 NOTE — PHYSICAL THERAPY INITIAL EVALUATION ADULT - CRITERIA FOR SKILLED THERAPEUTIC INTERVENTIONS
impairments found/rehab potential/therapy frequency/anticipated discharge recommendation/functional limitations in following categories/predicted duration of therapy intervention
[facility] vs Home with home PT, RW 24/7 supervision/assist/rehab potential/anticipated discharge recommendation/impairments found

## 2017-12-03 NOTE — PHYSICAL THERAPY INITIAL EVALUATION ADULT - IMPAIRMENTS FOUND, PT EVAL
aerobic capacity/endurance/gait, locomotion, and balance/muscle strength
arousal, attention, and cognition/gait, locomotion, and balance/poor safety awareness/ROM/muscle strength/posture

## 2017-12-03 NOTE — PROGRESS NOTE ADULT - GASTROINTESTINAL COMMENTS
distended, firm, protuberant,  tympanitic, prevena dressing on midline incision
Prevena removed - midline incision with staples CDI

## 2017-12-03 NOTE — PROGRESS NOTE ADULT - SUBJECTIVE AND OBJECTIVE BOX
HPI:  88 yo female, c/o progressively enlarging abd girth over 1 yr, s/p colonoscopy 10/19/17, found to have tubular adenoma at ileocecal valve and 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 Pt denies Nausea/vomiting, diarrhea. C/o constipation. Denies fever/chills.    Pt direct admission for operative intervention by Dr Flowers on 11/20.    Pt c/o SOB/dyspnea and mild vague chest discomfort upon admission to the floor, also c/o tender inner left lower leg, also c/o LE edema L > R and coldness of LE's    Pulmonary consult - Dr Bang,    Primary care consult - Dr Barbosa    Cardiology consult - Dr Bush (16 Nov 2017 15:13)     Allergies    Cipro (Unknown)  contrast media (iodine-based) (Unknown)  penicillins (Unknown)  shellfish (Unknown)  Valium (Unknown)    Intolerances      Renal insufficiency  Malignant neoplasm of descending colon  Microcytic anemia  ILD (interstitial lung disease)  Tricuspid valve insufficiency, unspecified etiology  Mitral valve insufficiency, unspecified etiology  Aortic valve insufficiency, etiology of cardiac valve disease unspecified  IDDM (insulin dependent diabetes mellitus)  COPD, mild  Atrial fibrillation, unspecified type  Diabetes  Cardiomegaly  Congestive heart failure, unspecified congestive heart failure chronicity, unspecified congestive heart failure type    MEDICATIONS  (STANDING):  ALBUTerol/ipratropium for Nebulization 3 milliLiter(s) Nebulizer every 6 hours  amylase/lipase/protease  (CREON  6,000 Units) 1 Capsule(s) Oral three times a day with meals  ascorbic acid 500 milliGRAM(s) Oral daily  calcium carbonate 1250 mG + Vitamin D (OsCal 500 + D) 1 Tablet(s) Oral two times a day  dextrose 5%. 1000 milliLiter(s) (50 mL/Hr) IV Continuous <Continuous>  dextrose 50% Injectable 12.5 Gram(s) IV Push once  dextrose 50% Injectable 25 Gram(s) IV Push once  dextrose 50% Injectable 25 Gram(s) IV Push once  diltiazem    Tablet 30 milliGRAM(s) Enteral Tube every 6 hours  heparin  Infusion.  Unit(s)/Hr (7 mL/Hr) IV Continuous <Continuous>  insulin lispro (HumaLOG) corrective regimen sliding scale   SubCutaneous every 6 hours  lidocaine   Patch 1 Patch Transdermal daily  methimazole 2.5 milliGRAM(s) Oral <User Schedule>  mirtazapine 15 milliGRAM(s) Oral at bedtime  torsemide 10 milliGRAM(s) Oral daily    MEDICATIONS  (PRN):  ALBUTerol    0.083% 2.5 milliGRAM(s) Nebulizer every 3 hours PRN Bronchospasm  dextrose Gel 1 Dose(s) Oral once PRN Blood Glucose LESS THAN 70 milliGRAM(s)/deciliter  glucagon  Injectable 1 milliGRAM(s) IntraMuscular once PRN Glucose LESS THAN 70 milligrams/deciliter  magnesium sulfate  IVPB 2 Gram(s) IV Intermittent once PRN magnesium <1.8  metoprolol    tartrate Injectable 5 milliGRAM(s) IV Push every 6 hours PRN For HR above 100  morphine  - Injectable 2 milliGRAM(s) IV Push every 6 hours PRN Tachypnea  ondansetron Injectable 4 milliGRAM(s) IV Push every 6 hours PRN Nausea                           9.8    22.6  )-----------( 270      ( 03 Dec 2017 08:19 )             32.2     12-03    142  |  99  |  23.0<H>  ----------------------------<  168<H>  3.5   |  26.0  |  1.13    Ca    8.9      03 Dec 2017 08:19  Phos  3.3     12-03  Mg     1.7     12-03      PT/INR - ( 03 Dec 2017 16:49 )   PT: 13.8 sec;   INR: 1.25 ratio         PTT - ( 03 Dec 2017 16:49 )  PTT:43.2 sec  ;  Vital Signs Last 24 Hrs  T(C): 36.6 (03 Dec 2017 16:00), Max: 37.6 (03 Dec 2017 13:24)  T(F): 97.9 (03 Dec 2017 16:00), Max: 99.7 (03 Dec 2017 13:24)  HR: 102 (03 Dec 2017 16:00) (89 - 135)  BP: 129/57 (03 Dec 2017 16:00) (129/57 - 166/74)  BP(mean): --  RR: 22 (03 Dec 2017 16:00) (21 - 34)  SpO2: 99% (03 Dec 2017 16:00) (95% - 99%)  CAPILLARY BLOOD GLUCOSE      12-02 @ 07:01  -  12-03 @ 07:00  --------------------------------------------------------  IN: 2453 mL / OUT: 0 mL / NET: 2453 mL    12-03 @ 07:01  -  12-03 @ 19:23  --------------------------------------------------------  IN: 880 mL / OUT: 0 mL / NET: 880 mL      Patient appears comfortable non verbal episode of SOB earlier    HEENT: PEARLA  NGT  Neck: Supple +JVD  Cardio: S1 S2  TRISH Irregular  Pulm: CTA Upper Lower lobes Low course crackles with rales  Abd: Soft NT +Distension BS+, incision clean  Rectal - refused  Ext: No DCT  Skin: No Rash  Neuro: Awake Non Verbal 5/5 power follows some commands        Acute CVA -Heparin Neuro coumadin once family decides on PEG if swallowing doesn't improve.  Dysphagia - NGT and poss Feeding if Ok with surgery CT- contrast throughout bowel  Metabolic and Resp Alkalosis - Ativan Pulm   Acute Congestive heart failure -restart  torsemide   COPD - increase neb frequency with cont o2  Malignant neoplasm of descending colon - Pain control await bowel fxn, NGT- for decompression  Renal insufficiency - Nephology following   ILD (interstitial lung disease) - Pulm  Multivalvular Dz - Cardiology  IDDM (insulin dependent diabetes mellitus) -  Lantus tonight and SS  Atrial fibrillation - Rate controlled    Spoke with Family about peg will need GI eval HPI:  89 yo female, c/o progressively enlarging abd girth over 1 yr, s/p colonoscopy 10/19/17, found to have tubular adenoma at ileocecal valve and 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 Pt denies Nausea/vomiting, diarrhea. C/o constipation. Denies fever/chills.    Pt direct admission for operative intervention by Dr Flowers on 11/20.    Pt c/o SOB/dyspnea and mild vague chest discomfort upon admission to the floor, also c/o tender inner left lower leg, also c/o LE edema L > R and coldness of LE's    Pulmonary consult - Dr Bang,    Primary care consult - Dr Barbosa    Cardiology consult - Dr Bush (16 Nov 2017 15:13)     Allergies    Cipro (Unknown)  contrast media (iodine-based) (Unknown)  penicillins (Unknown)  shellfish (Unknown)  Valium (Unknown)    Intolerances      Renal insufficiency  Malignant neoplasm of descending colon  Microcytic anemia  ILD (interstitial lung disease)  Tricuspid valve insufficiency, unspecified etiology  Mitral valve insufficiency, unspecified etiology  Aortic valve insufficiency, etiology of cardiac valve disease unspecified  IDDM (insulin dependent diabetes mellitus)  COPD, mild  Atrial fibrillation, unspecified type  Diabetes  Cardiomegaly  Congestive heart failure, unspecified congestive heart failure chronicity, unspecified congestive heart failure type    MEDICATIONS  (STANDING):  ALBUTerol/ipratropium for Nebulization 3 milliLiter(s) Nebulizer every 6 hours  amylase/lipase/protease  (CREON  6,000 Units) 1 Capsule(s) Oral three times a day with meals  ascorbic acid 500 milliGRAM(s) Oral daily  calcium carbonate 1250 mG + Vitamin D (OsCal 500 + D) 1 Tablet(s) Oral two times a day  dextrose 5%. 1000 milliLiter(s) (50 mL/Hr) IV Continuous <Continuous>  dextrose 50% Injectable 12.5 Gram(s) IV Push once  dextrose 50% Injectable 25 Gram(s) IV Push once  dextrose 50% Injectable 25 Gram(s) IV Push once  diltiazem    Tablet 30 milliGRAM(s) Enteral Tube every 6 hours  heparin  Infusion.  Unit(s)/Hr (7 mL/Hr) IV Continuous <Continuous>  insulin lispro (HumaLOG) corrective regimen sliding scale   SubCutaneous every 6 hours  lidocaine   Patch 1 Patch Transdermal daily  methimazole 2.5 milliGRAM(s) Oral <User Schedule>  mirtazapine 15 milliGRAM(s) Oral at bedtime  torsemide 10 milliGRAM(s) Oral daily    MEDICATIONS  (PRN):  ALBUTerol    0.083% 2.5 milliGRAM(s) Nebulizer every 3 hours PRN Bronchospasm  dextrose Gel 1 Dose(s) Oral once PRN Blood Glucose LESS THAN 70 milliGRAM(s)/deciliter  glucagon  Injectable 1 milliGRAM(s) IntraMuscular once PRN Glucose LESS THAN 70 milligrams/deciliter  magnesium sulfate  IVPB 2 Gram(s) IV Intermittent once PRN magnesium <1.8  metoprolol    tartrate Injectable 5 milliGRAM(s) IV Push every 6 hours PRN For HR above 100  morphine  - Injectable 2 milliGRAM(s) IV Push every 6 hours PRN Tachypnea  ondansetron Injectable 4 milliGRAM(s) IV Push every 6 hours PRN Nausea                           9.8    22.6  )-----------( 270      ( 03 Dec 2017 08:19 )             32.2     12-03    142  |  99  |  23.0<H>  ----------------------------<  168<H>  3.5   |  26.0  |  1.13    Ca    8.9      03 Dec 2017 08:19  Phos  3.3     12-03  Mg     1.7     12-03      PT/INR - ( 03 Dec 2017 16:49 )   PT: 13.8 sec;   INR: 1.25 ratio         PTT - ( 03 Dec 2017 16:49 )  PTT:43.2 sec  ;  Vital Signs Last 24 Hrs  T(C): 36.6 (03 Dec 2017 16:00), Max: 37.6 (03 Dec 2017 13:24)  T(F): 97.9 (03 Dec 2017 16:00), Max: 99.7 (03 Dec 2017 13:24)  HR: 102 (03 Dec 2017 16:00) (89 - 135)  BP: 129/57 (03 Dec 2017 16:00) (129/57 - 166/74)  BP(mean): --  RR: 22 (03 Dec 2017 16:00) (21 - 34)  SpO2: 99% (03 Dec 2017 16:00) (95% - 99%)  CAPILLARY BLOOD GLUCOSE      12-02 @ 07:01  -  12-03 @ 07:00  --------------------------------------------------------  IN: 2453 mL / OUT: 0 mL / NET: 2453 mL    12-03 @ 07:01  -  12-03 @ 19:23  --------------------------------------------------------  IN: 880 mL / OUT: 0 mL / NET: 880 mL      Patient appears comfortable non verbal episode of SOB earlier    HEENT: PEARLA  NGT  Neck: Supple +JVD  Cardio: S1 S2  TRISH Irregular  Pulm: CTA Upper Lower lobes Low course crackles   Rectal - refused  Ext: No DCT  Skin: No Rash  Neuro: Awake Non Verbal 5/5 power follows some commands      Leukocytosis - workup in progress, U/A, CXR- clear  Acute CVA -Heparin Neuro coumadin once family decides on PEG if swallowing doesn't improve.  Dysphagia - NGT and poss Feeding if Ok with surgery CT- contrast throughout bowel  Metabolic and Resp Alkalosis - Ativan Pulm   Acute Congestive heart failure -restart  torsemide   COPD - increase neb frequency with cont o2  Malignant neoplasm of descending colon - Pain control await bowel fxn, NGT- for decompression  Renal insufficiency - Nephology following   ILD (interstitial lung disease) - Pulm  Multivalvular Dz - Cardiology  IDDM (insulin dependent diabetes mellitus) -  Lantus tonight and SS  Atrial fibrillation - Rate controlled    Spoke with Family about peg will need GI eval

## 2017-12-03 NOTE — PROGRESS NOTE ADULT - RS GEN PE MLT RESP DETAILS PC
cta anteriorly/breath sounds equal/good air movement/clear to auscultation bilaterally
no rales/no wheezes/respirations non-labored/good air movement/breath sounds equal/normal/no rhonchi

## 2017-12-03 NOTE — PHYSICAL THERAPY INITIAL EVALUATION ADULT - REHAB POTENTIAL, PT EVAL
pt placed on PT trial of 1-2 sessions to determine appropriateness of PT
good, to achieve stated therapy goals

## 2017-12-03 NOTE — PHYSICAL THERAPY INITIAL EVALUATION ADULT - PERTINENT HX OF CURRENT PROBLEM, REHAB EVAL
88 yo female, c/o progressively enlarging abdominal girth over 1 yr, s/p colonoscopy 10/19/17, found to have tubular adenoma at ileocecal valve and 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 Pt now s/p Colon resection  11/26/2017  Laparoscopic assisted sigmoid resection with End to side anastomosis , mobilization of splenic flexure,  Lysis of Adhesions, Rigid proctosigmoidoscopy
88 yo female, c/o progressively enlarging abdominal girth over 1 yr, s/p colonoscopy 10/19/17, found to have tubular adenoma at ileocecal valve and 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 Pt now s/p Colon resection  11/26/2017  Laparoscopic assisted sigmoid resection with End to side anastomosis , mobilization of splenic flexure,  Lysis of Adhesions,Rigid proctosigmoidoscopy,now evaluation performed due to small bi frontal lobe and right lobe infarct

## 2017-12-03 NOTE — PHYSICAL THERAPY INITIAL EVALUATION ADULT - LEVEL OF INDEPENDENCE, REHAB EVAL
dependent (less than 25% patients effort)/pt c poor command following and very lethargic at this time
minimum assist (75% patients effort)

## 2017-12-03 NOTE — PHYSICAL THERAPY INITIAL EVALUATION ADULT - GENERAL OBSERVATIONS, REHAB EVAL
Pt found supine with + NGT, + O2 5Lnc + telemetry, + BP cuff + thornton + wound vac? spouse at bedside. Pt c/o abdominal pain, unrated, agreeable to PT with encouragement from PT and spouse.
Pt found supine with + NGT, + O2 5Lnc + telemetry,+ BP cuff, + thornton

## 2017-12-03 NOTE — PROGRESS NOTE ADULT - SUBJECTIVE AND OBJECTIVE BOX
SURGICAL PA NOTE:     STATUS POST:    Diagnosis:    Pre-Op Diagnosis:  Colon obstruction  11/26/2017    Active  Aneta Mike  Malignant neoplasm of sigmoid colon  11/26/2017  Sigmoid colon mass  Active  Aneta Mike.     Post-Op Dx:  Colon obstruction  11/26/2017    Active  Aneta Mike  Intra-abdominal adhesions  11/26/2017    Active  Aneta Mike  Malignant neoplasm of sigmoid colon  11/26/2017    Active  Aneta Mike.    Procedure:    Procedure:  Colon resection  11/26/2017  Laparoscopic assisted sigmoid resection with End to side anastomosis , mobilization of splenic flexure,  Lysis of Adhesions, Rigid proctosigmoidoscopy  Active  Aneta Mike. Colon resection  11/26/2017  Laparoscopic assisted sigmoid resection, mobilization of splenic flexure,  Lysis of Adhesions, Rigid proctosigmoidoscopy  Active  [id number]         Operative Findings:  · Operative Findings	Sigmoid colon mass - previously Tattooed during colonoscopy (tattooing present proximal and distal to palpable sigmoid mass). All Donuts retrieved at endorectal stapler firing End to Side anastomosis performed without tension Proctosigmoidoscopy - air bubble test negative for leak	  	 Sigmoid colon mass - previously Tattooed during colonoscopy (tattooing present proximal and distal to palpable sigmoid mass). All Donuts retrieved at endorectal stapler firing Side to end anastomosis performed without tension Proctosigmoidoscopy - air bubble test negative for leak 	    Specimens/Blood Loss/IV/Output/Protocol/VTE:    Specimens/Blood Loss/IV/Output/Protocol/VTE:  · Specimens	Resected sigmoid colon	  · Drains	Provina Vac dressing applied to skin (will remain place for 5 days post op)	  · Estimated Blood Loss	30 milliLiter(s)	  · IV Infusions - Crystalloids	1500 cc	  · IV Infusions - Colloids	None	  · IV Infusions - Blood Products	None	  · Urine Output	700 milliLiter(s)	        POST OPERATIVE DAY #: 7    Vital Signs Last 24 Hrs  T(C): 36.8 (03 Dec 2017 04:09), Max: 36.8 (03 Dec 2017 04:09)  T(F): 98.3 (03 Dec 2017 04:09), Max: 98.3 (03 Dec 2017 04:09)  HR: 102 (03 Dec 2017 08:00) (92 - 135)  BP: 139/66 (03 Dec 2017 08:00) (136/53 - 166/74)  BP(mean): --  RR: 24 (03 Dec 2017 08:00) (21 - 34)  SpO2: 96% (03 Dec 2017 09:44) (95% - 99%)    HPI:  89 yo female, c/o progressively enlarging abd girth over 1 yr, s/p colonoscopy 10/19/17, found to have tubular adenoma at ileocecal valve and 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 Pt denies Nausea/vomiting, diarrhea. C/o constipation. Denies fever/chills.    Pt direct admission for operative intervention by Dr Flowers on 11/20.    Pt c/o SOB/dyspnea and mild vague chest discomfort upon admission to the floor, also c/o tender inner left lower leg, also c/o LE edema L > R and coldness of LE's    Pulmonary consult - Dr Bang,    Primary care consult - Dr Barbosa    Cardiology consult - Dr Bush (16 Nov 2017 15:13)      Small bowel obstruction  Unknown h/o HF  H/o or current diagnosis of HF- ACEI/ARB contraindication unknown  No pertinent family history in first degree relatives  Handoff  MEWS Score  Renal insufficiency  Malignant neoplasm of descending colon  Microcytic anemia  ILD (interstitial lung disease)  Tricuspid valve insufficiency, unspecified etiology  Mitral valve insufficiency, unspecified etiology  Aortic valve insufficiency, etiology of cardiac valve disease unspecified  IDDM (insulin dependent diabetes mellitus)  COPD, mild  Atrial fibrillation, unspecified type  Diabetes  Cardiomegaly  Congestive heart failure, unspecified congestive heart failure chronicity, unspecified congestive heart failure type  Intra-abdominal adhesions  Colon obstruction  Malignant neoplasm of sigmoid colon  Malignant neoplasm  Colon obstruction  Malignant neoplasm of sigmoid colon  Stroke  Toxic encephalopathy  Malignant neoplasm of descending colon  Colon resection  H/O: hysterectomy  History of laparoscopic cholecystectomy  H/O splenectomy  UNSP INTESTNL OBST, UNSP AS TO      SUBJECTIVE: Pt seen lying supine with HOB up, arousable, nonverbal, NGT indwelling, tube feeds in progress, + BM x 2 in last 2 days per family    Diet: Glucerna 1.5 - goal of 60cc /hr    Activity: bedrest    Fevers: [ ]Yes [x ]NO  Chills: [ ] Yes [x ] NO  SOB:  [ ] YES [x ] NO  Dyspnea: [ ]YES [x ]NO  Chest Discomfort: [ ] YES [ ] NO    Nausea: [ ] YES [x ] NO           Vomiting: [ ] YES [ x] NO  Flatus: [ ] YES [x ] NO             Bowel Movement: [ ] YES [x ] NO  Diarrhea: [ ] YES [ ] NO         Void: [ ]YES [ x]No  Constipation: [ ] YES [ x] NO       Pain (0-10):              Pain Control Adequate: [ ] YES [ ] NO    Sloan:    NGT: indwelling      I&O's Detail    02 Dec 2017 07:01  -  03 Dec 2017 07:00  --------------------------------------------------------  IN:    Enteral Tube Flush: 250 mL    Enteral Tube Flush: 150 mL    Glucerna: 420 mL    heparin  Infusion.: 213 mL    Oral Fluid: 1000 mL    sodium chloride 0.45% with potassium chloride 20 mEq/L: 420 mL  Total IN: 2453 mL    OUT:  Total OUT: 0 mL    Total NET: 2453 mL      03 Dec 2017 07:01  -  03 Dec 2017 13:13  --------------------------------------------------------  IN:    Glucerna: 60 mL    heparin  Infusion.: 10 mL  Total IN: 70 mL    OUT:  Total OUT: 0 mL    Total NET: 70 mL        I&O's Summary    02 Dec 2017 07:01  -  03 Dec 2017 07:00  --------------------------------------------------------  IN: 2453 mL / OUT: 0 mL / NET: 2453 mL    03 Dec 2017 07:01  -  03 Dec 2017 13:13  --------------------------------------------------------  IN: 70 mL / OUT: 0 mL / NET: 70 mL      I&O's Detail    02 Dec 2017 07:01  -  03 Dec 2017 07:00  --------------------------------------------------------  IN:    Enteral Tube Flush: 250 mL    Enteral Tube Flush: 150 mL    Glucerna: 420 mL    heparin  Infusion.: 213 mL    Oral Fluid: 1000 mL    sodium chloride 0.45% with potassium chloride 20 mEq/L: 420 mL  Total IN: 2453 mL    OUT:  Total OUT: 0 mL    Total NET: 2453 mL      03 Dec 2017 07:01  -  03 Dec 2017 13:13  --------------------------------------------------------  IN:    Glucerna: 60 mL    heparin  Infusion.: 10 mL  Total IN: 70 mL    OUT:  Total OUT: 0 mL    Total NET: 70 mL          MEDICATIONS  (STANDING):  ALBUTerol/ipratropium for Nebulization 3 milliLiter(s) Nebulizer every 6 hours  amylase/lipase/protease  (CREON  6,000 Units) 1 Capsule(s) Oral three times a day with meals  ascorbic acid 500 milliGRAM(s) Oral daily  calcium carbonate 1250 mG + Vitamin D (OsCal 500 + D) 1 Tablet(s) Oral two times a day  dextrose 5%. 1000 milliLiter(s) (50 mL/Hr) IV Continuous <Continuous>  dextrose 50% Injectable 12.5 Gram(s) IV Push once  dextrose 50% Injectable 25 Gram(s) IV Push once  dextrose 50% Injectable 25 Gram(s) IV Push once  diltiazem    Tablet 30 milliGRAM(s) Enteral Tube every 6 hours  heparin  Infusion.  Unit(s)/Hr (7 mL/Hr) IV Continuous <Continuous>  insulin lispro (HumaLOG) corrective regimen sliding scale   SubCutaneous every 6 hours  lidocaine   Patch 1 Patch Transdermal daily  methimazole 2.5 milliGRAM(s) Oral <User Schedule>  mirtazapine 15 milliGRAM(s) Oral at bedtime  torsemide 10 milliGRAM(s) Oral daily    MEDICATIONS  (PRN):  ALBUTerol    0.083% 2.5 milliGRAM(s) Nebulizer every 3 hours PRN Bronchospasm  dextrose Gel 1 Dose(s) Oral once PRN Blood Glucose LESS THAN 70 milliGRAM(s)/deciliter  glucagon  Injectable 1 milliGRAM(s) IntraMuscular once PRN Glucose LESS THAN 70 milligrams/deciliter  magnesium sulfate  IVPB 2 Gram(s) IV Intermittent once PRN magnesium <1.8  metoprolol    tartrate Injectable 5 milliGRAM(s) IV Push every 6 hours PRN For HR above 100  morphine  - Injectable 2 milliGRAM(s) IV Push every 6 hours PRN Tachypnea  ondansetron Injectable 4 milliGRAM(s) IV Push every 6 hours PRN Nausea      LABS:                        9.8    22.6  )-----------( 270      ( 03 Dec 2017 08:19 )             32.2     12-03    142  |  99  |  23.0<H>  ----------------------------<  168<H>  3.5   |  26.0  |  1.13    Ca    8.9      03 Dec 2017 08:19  Phos  3.3     12-03  Mg     1.7     12-03      PTT - ( 03 Dec 2017 08:19 )  PTT:51.1 sec    ABG - ( 02 Dec 2017 04:03 )  pH: 7.45  /  pCO2: 42    /  pO2: 96    / HCO3: 29    / Base Excess: 4.7   /  SaO2: 98                  RADIOLOGY & ADDITIONAL STUDIES:

## 2017-12-03 NOTE — PHYSICAL THERAPY INITIAL EVALUATION ADULT - ORIENTATION, REHAB EVAL
Pt forgetful of reason for admission but easily reminded, incorrect  given/person
not oriented to person, place, time or situation

## 2017-12-04 LAB
ANION GAP SERPL CALC-SCNC: 15 MMOL/L — SIGNIFICANT CHANGE UP (ref 5–17)
ANISOCYTOSIS BLD QL: SLIGHT — SIGNIFICANT CHANGE UP
APPEARANCE UR: CLEAR — SIGNIFICANT CHANGE UP
APTT BLD: 34.6 SEC — SIGNIFICANT CHANGE UP (ref 27.5–37.4)
APTT BLD: 47.4 SEC — HIGH (ref 27.5–37.4)
BACTERIA # UR AUTO: ABNORMAL
BASOPHILS # BLD AUTO: 0 K/UL — SIGNIFICANT CHANGE UP (ref 0–0.2)
BILIRUB UR-MCNC: NEGATIVE — SIGNIFICANT CHANGE UP
BUN SERPL-MCNC: 36 MG/DL — HIGH (ref 8–20)
CALCIUM SERPL-MCNC: 9.1 MG/DL — SIGNIFICANT CHANGE UP (ref 8.6–10.2)
CHLORIDE SERPL-SCNC: 99 MMOL/L — SIGNIFICANT CHANGE UP (ref 98–107)
CO2 SERPL-SCNC: 31 MMOL/L — HIGH (ref 22–29)
COLOR SPEC: YELLOW — SIGNIFICANT CHANGE UP
CREAT SERPL-MCNC: 1.11 MG/DL — SIGNIFICANT CHANGE UP (ref 0.5–1.3)
DIFF PNL FLD: ABNORMAL
ELLIPTOCYTES BLD QL SMEAR: SLIGHT — SIGNIFICANT CHANGE UP
EOSINOPHIL # BLD AUTO: 0 K/UL — SIGNIFICANT CHANGE UP (ref 0–0.5)
EPI CELLS # UR: SIGNIFICANT CHANGE UP
GLUCOSE BLDC GLUCOMTR-MCNC: 212 MG/DL — HIGH (ref 70–99)
GLUCOSE BLDC GLUCOMTR-MCNC: 226 MG/DL — HIGH (ref 70–99)
GLUCOSE BLDC GLUCOMTR-MCNC: 227 MG/DL — HIGH (ref 70–99)
GLUCOSE BLDC GLUCOMTR-MCNC: 276 MG/DL — HIGH (ref 70–99)
GLUCOSE SERPL-MCNC: 213 MG/DL — HIGH (ref 70–115)
GLUCOSE UR QL: NEGATIVE MG/DL — SIGNIFICANT CHANGE UP
HCT VFR BLD CALC: 31.1 % — LOW (ref 37–47)
HGB BLD-MCNC: 9.3 G/DL — LOW (ref 12–16)
HYPOCHROMIA BLD QL: SLIGHT — SIGNIFICANT CHANGE UP
KETONES UR-MCNC: NEGATIVE — SIGNIFICANT CHANGE UP
LEUKOCYTE ESTERASE UR-ACNC: ABNORMAL
LYMPHOCYTES # BLD AUTO: 1.8 K/UL — SIGNIFICANT CHANGE UP (ref 1–4.8)
LYMPHOCYTES # BLD AUTO: 7 % — LOW (ref 20–55)
MACROCYTES BLD QL: SLIGHT — SIGNIFICANT CHANGE UP
MAGNESIUM SERPL-MCNC: 2 MG/DL — SIGNIFICANT CHANGE UP (ref 1.6–2.6)
MCHC RBC-ENTMCNC: 21.5 PG — LOW (ref 27–31)
MCHC RBC-ENTMCNC: 29.9 G/DL — LOW (ref 32–36)
MCV RBC AUTO: 72 FL — LOW (ref 81–99)
MICROCYTES BLD QL: SLIGHT — SIGNIFICANT CHANGE UP
MONOCYTES # BLD AUTO: 2.5 K/UL — HIGH (ref 0–0.8)
MONOCYTES NFR BLD AUTO: 13 % — HIGH (ref 3–10)
NEUTROPHILS # BLD AUTO: 15.4 K/UL — HIGH (ref 1.8–8)
NEUTROPHILS NFR BLD AUTO: 79 % — HIGH (ref 37–73)
NITRITE UR-MCNC: NEGATIVE — SIGNIFICANT CHANGE UP
NRBC # BLD: 6 /100 — HIGH (ref 0–0)
OVALOCYTES BLD QL SMEAR: SLIGHT — SIGNIFICANT CHANGE UP
PH UR: 6 — SIGNIFICANT CHANGE UP (ref 5–8)
PHOSPHATE SERPL-MCNC: 2.4 MG/DL — SIGNIFICANT CHANGE UP (ref 2.4–4.7)
PLAT MORPH BLD: NORMAL — SIGNIFICANT CHANGE UP
PLATELET # BLD AUTO: 352 K/UL — SIGNIFICANT CHANGE UP (ref 150–400)
POIKILOCYTOSIS BLD QL AUTO: SLIGHT — SIGNIFICANT CHANGE UP
POLYCHROMASIA BLD QL SMEAR: SLIGHT — SIGNIFICANT CHANGE UP
POTASSIUM SERPL-MCNC: 3.5 MMOL/L — SIGNIFICANT CHANGE UP (ref 3.5–5.3)
POTASSIUM SERPL-SCNC: 3.5 MMOL/L — SIGNIFICANT CHANGE UP (ref 3.5–5.3)
PROT UR-MCNC: 30 MG/DL
RBC # BLD: 4.32 M/UL — LOW (ref 4.4–5.2)
RBC # FLD: 21.8 % — HIGH (ref 11–15.6)
RBC BLD AUTO: ABNORMAL
RBC CASTS # UR COMP ASSIST: ABNORMAL /HPF (ref 0–4)
SCHISTOCYTES BLD QL AUTO: SLIGHT — SIGNIFICANT CHANGE UP
SODIUM SERPL-SCNC: 145 MMOL/L — SIGNIFICANT CHANGE UP (ref 135–145)
SP GR SPEC: 1.01 — SIGNIFICANT CHANGE UP (ref 1.01–1.02)
TARGETS BLD QL SMEAR: SLIGHT — SIGNIFICANT CHANGE UP
UROBILINOGEN FLD QL: NEGATIVE MG/DL — SIGNIFICANT CHANGE UP
VARIANT LYMPHS # BLD: 1 % — SIGNIFICANT CHANGE UP (ref 0–6)
WBC # BLD: 19.9 K/UL — HIGH (ref 4.8–10.8)
WBC # FLD AUTO: 19.9 K/UL — HIGH (ref 4.8–10.8)
WBC UR QL: >50

## 2017-12-04 PROCEDURE — 99232 SBSQ HOSP IP/OBS MODERATE 35: CPT

## 2017-12-04 PROCEDURE — 71010: CPT | Mod: 26

## 2017-12-04 RX ORDER — INSULIN GLARGINE 100 [IU]/ML
20 INJECTION, SOLUTION SUBCUTANEOUS AT BEDTIME
Qty: 0 | Refills: 0 | Status: DISCONTINUED | OUTPATIENT
Start: 2017-12-04 | End: 2017-12-06

## 2017-12-04 RX ORDER — FUROSEMIDE 40 MG
40 TABLET ORAL DAILY
Qty: 0 | Refills: 0 | Status: DISCONTINUED | OUTPATIENT
Start: 2017-12-04 | End: 2017-12-05

## 2017-12-04 RX ORDER — POTASSIUM CHLORIDE 20 MEQ
20 PACKET (EA) ORAL DAILY
Qty: 0 | Refills: 0 | Status: DISCONTINUED | OUTPATIENT
Start: 2017-12-04 | End: 2017-12-06

## 2017-12-04 RX ORDER — HEPARIN SODIUM 5000 [USP'U]/ML
INJECTION INTRAVENOUS; SUBCUTANEOUS
Qty: 25000 | Refills: 0 | Status: DISCONTINUED | OUTPATIENT
Start: 2017-12-04 | End: 2017-12-06

## 2017-12-04 RX ADMIN — Medication 5 MILLIGRAM(S): at 03:00

## 2017-12-04 RX ADMIN — Medication 1 CAPSULE(S): at 18:08

## 2017-12-04 RX ADMIN — MIRTAZAPINE 15 MILLIGRAM(S): 45 TABLET, ORALLY DISINTEGRATING ORAL at 22:43

## 2017-12-04 RX ADMIN — Medication 1 CAPSULE(S): at 08:19

## 2017-12-04 RX ADMIN — INSULIN GLARGINE 20 UNIT(S): 100 INJECTION, SOLUTION SUBCUTANEOUS at 22:52

## 2017-12-04 RX ADMIN — Medication 5 MILLIGRAM(S): at 23:26

## 2017-12-04 RX ADMIN — Medication 5 MILLIGRAM(S): at 16:28

## 2017-12-04 RX ADMIN — LIDOCAINE 1 PATCH: 4 CREAM TOPICAL at 05:21

## 2017-12-04 RX ADMIN — Medication 10 MILLIGRAM(S): at 05:29

## 2017-12-04 RX ADMIN — Medication 4: at 23:02

## 2017-12-04 RX ADMIN — LIDOCAINE 1 PATCH: 4 CREAM TOPICAL at 18:10

## 2017-12-04 RX ADMIN — Medication 3 MILLILITER(S): at 08:39

## 2017-12-04 RX ADMIN — Medication 500 MILLIGRAM(S): at 12:11

## 2017-12-04 RX ADMIN — HEPARIN SODIUM 1100 UNIT(S)/HR: 5000 INJECTION INTRAVENOUS; SUBCUTANEOUS at 10:16

## 2017-12-04 RX ADMIN — Medication 1 TABLET(S): at 18:08

## 2017-12-04 RX ADMIN — Medication 1 CAPSULE(S): at 12:10

## 2017-12-04 RX ADMIN — Medication 3 MILLILITER(S): at 03:41

## 2017-12-04 RX ADMIN — MORPHINE SULFATE 2 MILLIGRAM(S): 50 CAPSULE, EXTENDED RELEASE ORAL at 23:07

## 2017-12-04 RX ADMIN — Medication 20 MILLIEQUIVALENT(S): at 12:27

## 2017-12-04 RX ADMIN — HEPARIN SODIUM 1100 UNIT(S)/HR: 5000 INJECTION INTRAVENOUS; SUBCUTANEOUS at 18:23

## 2017-12-04 RX ADMIN — Medication 4: at 12:11

## 2017-12-04 RX ADMIN — Medication 4: at 05:28

## 2017-12-04 RX ADMIN — Medication 3 MILLILITER(S): at 19:38

## 2017-12-04 RX ADMIN — Medication 3 MILLILITER(S): at 15:43

## 2017-12-04 RX ADMIN — Medication 5 MILLIGRAM(S): at 10:27

## 2017-12-04 RX ADMIN — Medication 1 TABLET(S): at 05:29

## 2017-12-04 RX ADMIN — Medication 6: at 18:07

## 2017-12-04 NOTE — PROGRESS NOTE ADULT - SUBJECTIVE AND OBJECTIVE BOX
SURGICAL PA NOTE:     STATUS POST:      Diagnosis:    Pre-Op Diagnosis:  Colon obstruction  2017    Active  Aneta Mike  Malignant neoplasm of sigmoid colon  2017  Sigmoid colon mass  Active  Aneta Mike.     Post-Op Dx:  Colon obstruction  2017    Active  Aneta Mike  Intra-abdominal adhesions  2017    Active  Aneta Mike  Malignant neoplasm of sigmoid colon  2017    Active  Aneta Mike.    Procedure:    Procedure:  Colon resection  2017  Laparoscopic assisted sigmoid resection with End to side anastomosis , mobilization of splenic flexure,  Lysis of Adhesions, Rigid proctosigmoidoscopy  Active  Aneta Mike. Colon resection  2017  Laparoscopic assisted sigmoid resection, mobilization of splenic flexure,  Lysis of Adhesions, Rigid proctosigmoidoscopy  Active  [id number]         Operative Findings:  · Operative Findings	Sigmoid colon mass - previously Tattooed during colonoscopy (tattooing present proximal and distal to palpable sigmoid mass). All Donuts retrieved at endorectal stapler firing End to Side anastomosis performed without tension Proctosigmoidoscopy - air bubble test negative for leak	  	 Sigmoid colon mass - previously Tattooed during colonoscopy (tattooing present proximal and distal to palpable sigmoid mass). All Donuts retrieved at endorectal stapler firing Side to end anastomosis performed without tension Proctosigmoidoscopy - air bubble test negative for leak 	    Specimens/Blood Loss/IV/Output/Protocol/VTE:    Specimens/Blood Loss/IV/Output/Protocol/VTE:  · Specimens	Resected sigmoid colon	  · Drains	Provina Vac dressing applied to skin (will remain place for 5 days post op)	  · Estimated Blood Loss	30 milliLiter(s)	      POST OPERATIVE DAY #: 8    Vital Signs Last 24 Hrs  T(C): 36.4 (04 Dec 2017 08:34), Max: 37.7 (03 Dec 2017 20:00)  T(F): 97.5 (04 Dec 2017 08:34), Max: 99.9 (03 Dec 2017 20:00)  HR: 113 (04 Dec 2017 12:00) (97 - 117)  BP: 105/75 (04 Dec 2017 12:00) (105/75 - 162/77)  BP(mean): 99 (04 Dec 2017 04:00) (70 - 99)  RR: 20 (04 Dec 2017 12:00) (19 - 28)  SpO2: 96% (04 Dec 2017 12:00) (95% - 99%)    HPI:  89 yo female, c/o progressively enlarging abd girth over 1 yr, s/p colonoscopy 10/19/17, found to have tubular adenoma at ileocecal valve and 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 Pt denies Nausea/vomiting, diarrhea. C/o constipation. Denies fever/chills.    Pt direct admission for operative intervention by Dr Flowers on .    Pt c/o SOB/dyspnea and mild vague chest discomfort upon admission to the floor, also c/o tender inner left lower leg, also c/o LE edema L > R and coldness of LE's    Pulmonary consult - Dr Bang,    Primary care consult - Dr Barbosa    Cardiology consult - Dr Bush (2017 15:13)      Small bowel obstruction  Unknown h/o HF  H/o or current diagnosis of HF- ACEI/ARB contraindication unknown  No pertinent family history in first degree relatives  Handoff  MEWS Score  Renal insufficiency  Malignant neoplasm of descending colon  Microcytic anemia  ILD (interstitial lung disease)  Tricuspid valve insufficiency, unspecified etiology  Mitral valve insufficiency, unspecified etiology  Aortic valve insufficiency, etiology of cardiac valve disease unspecified  IDDM (insulin dependent diabetes mellitus)  COPD, mild  Atrial fibrillation, unspecified type  Diabetes  Cardiomegaly  Congestive heart failure, unspecified congestive heart failure chronicity, unspecified congestive heart failure type  Intra-abdominal adhesions  Colon obstruction  Malignant neoplasm of sigmoid colon  Malignant neoplasm  Colon obstruction  Malignant neoplasm of sigmoid colon  Stroke  Toxic encephalopathy  Malignant neoplasm of descending colon  Colon resection  H/O: hysterectomy  History of laparoscopic cholecystectomy  H/O splenectomy  UNSP INTESTNL OBST, UNSP AS TO      SUBJECTIVE: Pt seen lying supine with HOB up, NGT indwelling, awake and alert, moaning, no verbal responses to questions, NGT indwelling, NGT resecured to nose with steri strips, py pulling on NGT    Diet: Glucerna tube feeds    Activity: bedrest    Fevers: [ ]Yes [ x]NO  Chills: [ ] Yes [x ] NO  SOB:  [ ] YES [ ] NO  Dyspnea: [ ]YES [ ]NO  Chest Discomfort: [ ] YES [ ] NO    Nausea: [ ] YES [ ] NO           Vomiting: [ ] YES [x ] NO  Flatus: [ ] YES [ ] NO             Bowel Movement: [ ] YES [ ] NO ???  Diarrhea: [ ] YES [ ] NO         Void: [x ]YES [ ]No  Constipation: [ ] YES [ ] NO       Pain (0-10):              Pain Control Adequate: [ ] YES [ ] NO    Nathalia:    EDILMAT: indwelling      I&O's Detail    03 Dec 2017 07:  -  04 Dec 2017 07:00  --------------------------------------------------------  IN:    Enteral Tube Flush: 750 mL    Glucerna: 1440 mL    heparin  Infusion.: 240 mL  Total IN: 2430 mL    OUT:  Total OUT: 0 mL    Total NET: 2430 mL      04 Dec 2017 07:  -  04 Dec 2017 13:11  --------------------------------------------------------  IN:    Enteral Tube Flush: 250 mL    Glucerna: 240 mL    heparin  Infusion.: 42 mL  Total IN: 532 mL    OUT:  Total OUT: 0 mL    Total NET: 532 mL        I&O's Summary    03 Dec 2017 07:01  -  04 Dec 2017 07:00  --------------------------------------------------------  IN: 2430 mL / OUT: 0 mL / NET: 2430 mL    04 Dec 2017 07:01  -  04 Dec 2017 13:11  --------------------------------------------------------  IN: 532 mL / OUT: 0 mL / NET: 532 mL      I&O's Detail    03 Dec 2017 07:01  -  04 Dec 2017 07:00  --------------------------------------------------------  IN:    Enteral Tube Flush: 750 mL    Glucerna: 1440 mL    heparin  Infusion.: 240 mL  Total IN: 2430 mL    OUT:  Total OUT: 0 mL    Total NET: 2430 mL      04 Dec 2017 07:01  -  04 Dec 2017 13:11  --------------------------------------------------------  IN:    Enteral Tube Flush: 250 mL    Glucerna: 240 mL    heparin  Infusion.: 42 mL  Total IN: 532 mL    OUT:  Total OUT: 0 mL    Total NET: 532 mL          MEDICATIONS  (STANDING):  ALBUTerol/ipratropium for Nebulization 3 milliLiter(s) Nebulizer every 6 hours  amylase/lipase/protease  (CREON  6,000 Units) 1 Capsule(s) Oral three times a day with meals  ascorbic acid 500 milliGRAM(s) Oral daily  calcium carbonate 1250 mG + Vitamin D (OsCal 500 + D) 1 Tablet(s) Oral two times a day  dextrose 5%. 1000 milliLiter(s) (50 mL/Hr) IV Continuous <Continuous>  dextrose 50% Injectable 12.5 Gram(s) IV Push once  dextrose 50% Injectable 25 Gram(s) IV Push once  dextrose 50% Injectable 25 Gram(s) IV Push once  diltiazem    Tablet 30 milliGRAM(s) Enteral Tube every 6 hours  furosemide    Tablet 40 milliGRAM(s) Oral daily  heparin  Infusion.  Unit(s)/Hr (7 mL/Hr) IV Continuous <Continuous>  insulin lispro (HumaLOG) corrective regimen sliding scale   SubCutaneous every 6 hours  lidocaine   Patch 1 Patch Transdermal daily  methimazole 2.5 milliGRAM(s) Oral <User Schedule>  mirtazapine 15 milliGRAM(s) Oral at bedtime  potassium chloride    Tablet ER 20 milliEquivalent(s) Oral daily    MEDICATIONS  (PRN):  ALBUTerol    0.083% 2.5 milliGRAM(s) Nebulizer every 3 hours PRN Bronchospasm  dextrose Gel 1 Dose(s) Oral once PRN Blood Glucose LESS THAN 70 milliGRAM(s)/deciliter  glucagon  Injectable 1 milliGRAM(s) IntraMuscular once PRN Glucose LESS THAN 70 milligrams/deciliter  magnesium sulfate  IVPB 2 Gram(s) IV Intermittent once PRN magnesium <1.8  metoprolol    tartrate Injectable 5 milliGRAM(s) IV Push every 6 hours PRN For HR above 100  morphine  - Injectable 2 milliGRAM(s) IV Push every 6 hours PRN Tachypnea  ondansetron Injectable 4 milliGRAM(s) IV Push every 6 hours PRN Nausea      LABS:                        9.3    19.9  )-----------( 352      ( 04 Dec 2017 06:53 )             31.1     12-04    145  |  99  |  36.0<H>  ----------------------------<  213<H>  3.5   |  31.0<H>  |  1.11    Ca    9.1      04 Dec 2017 06:53  Phos  2.4     12-04  Mg     2.0     12-04      PT/INR - ( 03 Dec 2017 16:49 )   PT: 13.8 sec;   INR: 1.25 ratio         PTT - ( 04 Dec 2017 06:53 )  PTT:34.6 sec  Urinalysis Basic - ( 04 Dec 2017 07:06 )    Color: Yellow / Appearance: Clear / S.010 / pH: x  Gluc: x / Ketone: Negative  / Bili: Negative / Urobili: Negative mg/dL   Blood: x / Protein: 30 mg/dL / Nitrite: Negative   Leuk Esterase: Moderate / RBC: 3-5 /HPF / WBC >50   Sq Epi: x / Non Sq Epi: Few / Bacteria: Few          RADIOLOGY & ADDITIONAL STUDIES:

## 2017-12-04 NOTE — PROGRESS NOTE ADULT - SUBJECTIVE AND OBJECTIVE BOX
PULMONARY PROGRESS NOTE      ALFREDO [id number]    Patient is a 89y old  Female who presents with a chief complaint of colon mass (16 Nov 2017 15:13)      INTERVAL HPI/OVERNIGHT EVENTS: Offers no history. Awake and alert. No distress.     MEDICATIONS  (STANDING):  ALBUTerol/ipratropium for Nebulization 3 milliLiter(s) Nebulizer every 6 hours  amylase/lipase/protease  (CREON  6,000 Units) 1 Capsule(s) Oral three times a day with meals  ascorbic acid 500 milliGRAM(s) Oral daily  calcium carbonate 1250 mG + Vitamin D (OsCal 500 + D) 1 Tablet(s) Oral two times a day  dextrose 5%. 1000 milliLiter(s) (50 mL/Hr) IV Continuous <Continuous>  dextrose 50% Injectable 12.5 Gram(s) IV Push once  dextrose 50% Injectable 25 Gram(s) IV Push once  dextrose 50% Injectable 25 Gram(s) IV Push once  diltiazem    Tablet 30 milliGRAM(s) Enteral Tube every 6 hours  heparin  Infusion.  Unit(s)/Hr (7 mL/Hr) IV Continuous <Continuous>  insulin lispro (HumaLOG) corrective regimen sliding scale   SubCutaneous every 6 hours  lidocaine   Patch 1 Patch Transdermal daily  methimazole 2.5 milliGRAM(s) Oral <User Schedule>  mirtazapine 15 milliGRAM(s) Oral at bedtime  potassium chloride    Tablet ER 20 milliEquivalent(s) Oral daily      MEDICATIONS  (PRN):  ALBUTerol    0.083% 2.5 milliGRAM(s) Nebulizer every 3 hours PRN Bronchospasm  dextrose Gel 1 Dose(s) Oral once PRN Blood Glucose LESS THAN 70 milliGRAM(s)/deciliter  glucagon  Injectable 1 milliGRAM(s) IntraMuscular once PRN Glucose LESS THAN 70 milligrams/deciliter  magnesium sulfate  IVPB 2 Gram(s) IV Intermittent once PRN magnesium <1.8  metoprolol    tartrate Injectable 5 milliGRAM(s) IV Push every 6 hours PRN For HR above 100  morphine  - Injectable 2 milliGRAM(s) IV Push every 6 hours PRN Tachypnea  ondansetron Injectable 4 milliGRAM(s) IV Push every 6 hours PRN Nausea      Allergies    Cipro (Unknown)  contrast media (iodine-based) (Unknown)  penicillins (Unknown)  shellfish (Unknown)  Valium (Unknown)    Intolerances        PAST MEDICAL & SURGICAL HISTORY:  Renal insufficiency  Malignant neoplasm of descending colon  Microcytic anemia  ILD (interstitial lung disease)  Tricuspid valve insufficiency, unspecified etiology  Mitral valve insufficiency, unspecified etiology  Aortic valve insufficiency, etiology of cardiac valve disease unspecified  IDDM (insulin dependent diabetes mellitus)  COPD, mild  Atrial fibrillation, unspecified type  Diabetes  Cardiomegaly  Congestive heart failure, unspecified congestive heart failure chronicity, unspecified congestive heart failure type  H/O: hysterectomy  History of laparoscopic cholecystectomy  H/O splenectomy             REVIEW OF SYSTEMS:    unavailable    Vital Signs Last 24 Hrs  T(C): 36.4 (04 Dec 2017 08:34), Max: 37.7 (03 Dec 2017 20:00)  T(F): 97.5 (04 Dec 2017 08:34), Max: 99.9 (03 Dec 2017 20:00)  HR: 102 (04 Dec 2017 08:00) (89 - 115)  BP: 150/82 (04 Dec 2017 08:00) (116/65 - 153/78)  BP(mean): 99 (04 Dec 2017 04:00) (70 - 99)  RR: 22 (04 Dec 2017 08:00) (21 - 28)  SpO2: 96% (04 Dec 2017 08:00) (95% - 99%)    PHYSICAL EXAMINATION:    GENERAL: The patient is awake and alert in no apparent distress.     HEENT: Head is normocephalic and atraumatic. Extraocular muscles are intact. Mucous membranes are moist.    NECK: Supple.    LUNGS: Clear to auscultation without wheezing, rales or rhonchi; difficult exam though because vocalizing throughout    HEART: Regular rate and rhythm      ABDOMEN: Soft, nontender, and nondistended.      EXTREMITIES: Without any cyanosis, clubbing, rash, lesions or edema.

## 2017-12-04 NOTE — PROGRESS NOTE ADULT - SUBJECTIVE AND OBJECTIVE BOX
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 Pt denies Nausea/vomiting, diarrhea. C/o constipation. Denies fever/chills.    Pt direct admission for operative intervention by Dr Flowers on .    Pt c/o SOB/dyspnea and mild vague chest discomfort upon admission to the floor, also c/o tender inner left lower leg, also c/o LE edema L > R and coldness of LE's    Pulmonary consult - Dr Bang,    Primary care consult - Dr Barbosa    Cardiology consult - Dr Bush (2017 15:13)     Allergies    Cipro (Unknown)  contrast media (iodine-based) (Unknown)  penicillins (Unknown)  shellfish (Unknown)  Valium (Unknown)    Intolerances      Renal insufficiency  Malignant neoplasm of descending colon  Microcytic anemia  ILD (interstitial lung disease)  Tricuspid valve insufficiency, unspecified etiology  Mitral valve insufficiency, unspecified etiology  Aortic valve insufficiency, etiology of cardiac valve disease unspecified  IDDM (insulin dependent diabetes mellitus)  COPD, mild  Atrial fibrillation, unspecified type  Diabetes  Cardiomegaly  Congestive heart failure, unspecified congestive heart failure chronicity, unspecified congestive heart failure type    MEDICATIONS  (STANDING):  ALBUTerol/ipratropium for Nebulization 3 milliLiter(s) Nebulizer every 6 hours  amylase/lipase/protease  (CREON  6,000 Units) 1 Capsule(s) Oral three times a day with meals  ascorbic acid 500 milliGRAM(s) Oral daily  calcium carbonate 1250 mG + Vitamin D (OsCal 500 + D) 1 Tablet(s) Oral two times a day  dextrose 5%. 1000 milliLiter(s) (50 mL/Hr) IV Continuous <Continuous>  dextrose 50% Injectable 12.5 Gram(s) IV Push once  dextrose 50% Injectable 25 Gram(s) IV Push once  dextrose 50% Injectable 25 Gram(s) IV Push once  diltiazem    Tablet 30 milliGRAM(s) Enteral Tube every 6 hours  furosemide    Tablet 40 milliGRAM(s) Oral daily  heparin  Infusion.  Unit(s)/Hr (7 mL/Hr) IV Continuous <Continuous>  insulin glargine Injectable (LANTUS) 20 Unit(s) SubCutaneous at bedtime  insulin lispro (HumaLOG) corrective regimen sliding scale   SubCutaneous every 6 hours  lidocaine   Patch 1 Patch Transdermal daily  methimazole 2.5 milliGRAM(s) Oral <User Schedule>  mirtazapine 15 milliGRAM(s) Oral at bedtime  potassium chloride    Tablet ER 20 milliEquivalent(s) Oral daily    MEDICATIONS  (PRN):  ALBUTerol    0.083% 2.5 milliGRAM(s) Nebulizer every 3 hours PRN Bronchospasm  dextrose Gel 1 Dose(s) Oral once PRN Blood Glucose LESS THAN 70 milliGRAM(s)/deciliter  glucagon  Injectable 1 milliGRAM(s) IntraMuscular once PRN Glucose LESS THAN 70 milligrams/deciliter  magnesium sulfate  IVPB 2 Gram(s) IV Intermittent once PRN magnesium <1.8  metoprolol    tartrate Injectable 5 milliGRAM(s) IV Push every 6 hours PRN For HR above 100  morphine  - Injectable 2 milliGRAM(s) IV Push every 6 hours PRN Tachypnea  ondansetron Injectable 4 milliGRAM(s) IV Push every 6 hours PRN Nausea                           9.3    19.9  )-----------( 352      ( 04 Dec 2017 06:53 )             31.1     12-04    145  |  99  |  36.0<H>  ----------------------------<  213<H>  3.5   |  31.0<H>  |  1.11    Ca    9.1      04 Dec 2017 06:53  Phos  2.4     12-04  Mg     2.0     12-04      PT/INR - ( 03 Dec 2017 16:49 )   PT: 13.8 sec;   INR: 1.25 ratio         PTT - ( 04 Dec 2017 17:40 )  PTT:47.4 sec  Urinalysis Basic - ( 04 Dec 2017 07:06 )    Color: Yellow / Appearance: Clear / S.010 / pH: x  Gluc: x / Ketone: Negative  / Bili: Negative / Urobili: Negative mg/dL   Blood: x / Protein: 30 mg/dL / Nitrite: Negative   Leuk Esterase: Moderate / RBC: 3-5 /HPF / WBC >50   Sq Epi: x / Non Sq Epi: Few / Bacteria: Few    ;  Vital Signs Last 24 Hrs  T(C): 36.3 (04 Dec 2017 15:46), Max: 37.7 (03 Dec 2017 20:00)  T(F): 97.4 (04 Dec 2017 15:46), Max: 99.9 (03 Dec 2017 20:00)  HR: 96 (04 Dec 2017 16:34) (96 - 117)  BP: 143/86 (04 Dec 2017 16:34) (105/75 - 162/77)  BP(mean): 99 (04 Dec 2017 04:00) (70 - 99)  RR: 22 (04 Dec 2017 16:34) (19 - 28)  SpO2: 94% (04 Dec 2017 16:34) (94% - 99%)  CAPILLARY BLOOD GLUCOSE       @ 07:  -   @ 07:00  --------------------------------------------------------  IN: 2430 mL / OUT: 0 mL / NET: 2430 mL     @ 07:  -   @ 19:11  --------------------------------------------------------  IN: 1269 mL / OUT: 0 mL / NET: 1269 mL           @ 07:  -   @ 07:00  --------------------------------------------------------  IN: 2453 mL / OUT: 0 mL / NET: 2453 mL     @ 07:  -   @ 19:23  --------------------------------------------------------  IN: 880 mL / OUT: 0 mL / NET: 880 mL      Patient appears comfortable occassional verbal responce    HEENT: MARCOS  NGT  Neck: Supple +JVD  Cardio: S1 S2  TRISH Irregular  Pulm: CTA Upper Lower lobes Low course crackles   Rectal - refused  Ext: No DCT  Skin: No Rash  Neuro: Awake Non Verbal 5/5 power follows some commands      Leukocytosis - workup in progress, U/A, CXR- clear  Acute CVA -Heparin Neuro coumadin once family decides on PEG if swallowing doesn't improve.  Dysphagia - NGT and poss Feeding if Ok with surgery CT- contrast throughout bowel  Metabolic and Resp Alkalosis - Ativan Pulm   Acute Congestive heart failure -torsemide dc by renal Lasix started by cardio  COPD - increase neb frequency with cont o2  Malignant neoplasm of descending colon - Pain control await bowel fxn, NGT- for decompression  Renal insufficiency - Nephology following   ILD (interstitial lung disease) - Pulm  Multivalvular Dz - Cardiology  IDDM (insulin dependent diabetes mellitus) -  poor control add lantus change heparin to saline  Atrial fibrillation - Rate controlled    Spoke with Family about peg will need GI eval

## 2017-12-04 NOTE — PROGRESS NOTE ADULT - SUBJECTIVE AND OBJECTIVE BOX
INTERVAL HISTORY:  some improvement noted      VITAL SIGNS:  Vital Signs Last 24 Hrs  T(C): 37.2 (04 Dec 2017 03:53), Max: 37.7 (03 Dec 2017 20:00)  T(F): 99 (04 Dec 2017 03:53), Max: 99.9 (03 Dec 2017 20:00)  HR: 102 (04 Dec 2017 08:00) (89 - 115)  BP: 150/82 (04 Dec 2017 08:00) (116/65 - 153/78)  BP(mean): 99 (04 Dec 2017 04:00) (70 - 99)  RR: 22 (04 Dec 2017 08:00) (21 - 28)  SpO2: 96% (04 Dec 2017 08:00) (95% - 99%)    PHYSICAL EXAMINATION:    Mentation:  awake, nods to questions and follows some commands  Language/Speech:moans, no intelligible sppech  CN: no gross facial asymmetry  Visual Fields: full to threat  Motor: limited effort- moves feets and squeezes hands  Sensory:  DTR:  Babinski:      MEDS:  MEDICATIONS  (STANDING):  ALBUTerol/ipratropium for Nebulization 3 milliLiter(s) Nebulizer every 6 hours  amylase/lipase/protease  (CREON  6,000 Units) 1 Capsule(s) Oral three times a day with meals  ascorbic acid 500 milliGRAM(s) Oral daily  calcium carbonate 1250 mG + Vitamin D (OsCal 500 + D) 1 Tablet(s) Oral two times a day  dextrose 5%. 1000 milliLiter(s) (50 mL/Hr) IV Continuous <Continuous>  dextrose 50% Injectable 12.5 Gram(s) IV Push once  dextrose 50% Injectable 25 Gram(s) IV Push once  dextrose 50% Injectable 25 Gram(s) IV Push once  diltiazem    Tablet 30 milliGRAM(s) Enteral Tube every 6 hours  heparin  Infusion.  Unit(s)/Hr (7 mL/Hr) IV Continuous <Continuous>  insulin lispro (HumaLOG) corrective regimen sliding scale   SubCutaneous every 6 hours  lidocaine   Patch 1 Patch Transdermal daily  methimazole 2.5 milliGRAM(s) Oral <User Schedule>  mirtazapine 15 milliGRAM(s) Oral at bedtime  potassium chloride    Tablet ER 20 milliEquivalent(s) Oral daily    MEDICATIONS  (PRN):  ALBUTerol    0.083% 2.5 milliGRAM(s) Nebulizer every 3 hours PRN Bronchospasm  dextrose Gel 1 Dose(s) Oral once PRN Blood Glucose LESS THAN 70 milliGRAM(s)/deciliter  glucagon  Injectable 1 milliGRAM(s) IntraMuscular once PRN Glucose LESS THAN 70 milligrams/deciliter  magnesium sulfate  IVPB 2 Gram(s) IV Intermittent once PRN magnesium <1.8  metoprolol    tartrate Injectable 5 milliGRAM(s) IV Push every 6 hours PRN For HR above 100  morphine  - Injectable 2 milliGRAM(s) IV Push every 6 hours PRN Tachypnea  ondansetron Injectable 4 milliGRAM(s) IV Push every 6 hours PRN Nausea      LABS:                          9.3    19.9  )-----------( 352      ( 04 Dec 2017 06:53 )             31.1     12-04    145  |  99  |  36.0<H>  ----------------------------<  213<H>  3.5   |  31.0<H>  |  1.11    Ca    9.1      04 Dec 2017 06:53  Phos  2.4     12-04  Mg     2.0     12-04            RADIOLOGY & ADDITIONAL STUDIES:    MRI- bilateral acute infarcts    IMPRESSION & PLAN:    Cardioembolic strokes as described on MRI.  She is modestly improved over the past few days.  Prognosis for signficant functional recovery is fair.  Ideally, she should be restarted on coumadin as soon as possible  Swallow eval and need for PEG. DNR/ DNI status should be assessed  Dispo planning - may be candidate for low level rehab is she continues to show some neurologic improvment  Will not actively follow.  Please recontact as needed.

## 2017-12-04 NOTE — PROGRESS NOTE ADULT - PROBLEM SELECTOR PLAN 1
Multiple factors likely contributing to current clinical situation.  Metabolic and Medical management- supportive care and nutritional support  MRI brain to evaluate for acute stroke not identified on CT brain;  Carotid Duplex (?) to eval for potential occult high grade stenosis that would cause resultant distal low flow ischemia event.  ongoing management of medical/metabolic abnormalities  Observation
no need to repeat MRI/Duplex.  supportive care.  treatment of medical/metabolic abnormalities  defer asa or other anticoagulant at present.  treatment of agitation as needed.  supportive care.  discuss DNR/DNI options with family. - prognosis guarded.
cont tube feeds via NGT for now, speech and swallow eval today, if fails, will consult GI for PEG placement for feedings, plan per Cardiology/Pulmonary/Nephrology/Neurology, plan per Dr Flowers, k labs
cont NPO, await return of bowel function, cont NGT, cont gentle IVF, plan per Dr Flowers
cont tube feeds as ordered, chk labs, chk U/A and urine cx, routine blood cx, chk Med/Renal/Neuro/cardiology follow up, plan per Dr Flowers

## 2017-12-04 NOTE — PROGRESS NOTE ADULT - SUBJECTIVE AND OBJECTIVE BOX
[facility]                                   [address] [place] [address]                                                      PHONE: [phone]                                                         FAX: [phone]  ----------------------------------------------------------------------------------------------------    Pt seen and examined. FU for AF    Overnight events/Complaints: Pt without complains. Getting NG tube feeds.     Vital Signs Last 24 Hrs  T(C): 36.4 (04 Dec 2017 08:34), Max: 37.7 (03 Dec 2017 20:00)  T(F): 97.5 (04 Dec 2017 08:34), Max: 99.9 (03 Dec 2017 20:00)  HR: 102 (04 Dec 2017 08:00) (89 - 115)  BP: 150/82 (04 Dec 2017 08:00) (116/65 - 153/78)  BP(mean): 99 (04 Dec 2017 04:00) (70 - 99)  RR: 22 (04 Dec 2017 08:00) (21 - 28)  SpO2: 96% (04 Dec 2017 08:00) (95% - 99%)  I&O's Summary    03 Dec 2017 07:01  -  04 Dec 2017 07:00  --------------------------------------------------------  IN: 2370 mL / OUT: 0 mL / NET: 2370 mL    04 Dec 2017 07:01  -  04 Dec 2017 10:02  --------------------------------------------------------  IN: 270 mL / OUT: 0 mL / NET: 270 mL        PHYSICAL EXAM:  Constitutional: Appears tired.   HEENT:     Conjunctiva normal, Normal oral mucosa, No JVD	  Cardiovascular: S1, S2 irregular, tachycardic, + ESM  Respiratory: Lungs clear to auscultation; no crepitations, no wheeze  Gastrointestinal:  Soft, Non-tender, + BS	  Extremities: No cyanosis, clubbing or edema  Skin: Warm and dry  Neurologic: Unable to assess  Psychiatric: affect was flat        LABS:                        9.3    19.9  )-----------( 352      ( 04 Dec 2017 06:53 )             31.1     12-04    145  |  99  |  36.0<H>  ----------------------------<  213<H>  3.5   |  31.0<H>  |  1.11    Ca    9.1      04 Dec 2017 06:53  Phos  2.4     12-04  Mg     2.0     12-04    PT/INR - ( 03 Dec 2017 16:49 )   PT: 13.8 sec;   INR: 1.25 ratio      PTT - ( 04 Dec 2017 06:53 )  PTT:34.6 sec    RADIOLOGY & ADDITIONAL STUDIES:    CARDIOLOGY TESTING:     Telemetry: No arrhythmias    Echocardiogram:  < from: TTE Echo Complete w/Doppler (11.30.17 @ 08:15) >   Summary:   1. Left ventricular ejection fraction, byvisual estimation, is 45 to   50%.   2. Mildly decreased global left ventricular systolic function.   3. Entire septum and apex are abnormal as described above.   4. Moderately increased LV wall thickness.   5. Normal left ventricular internal cavitysize.   6. The mitral in-flow pattern reveals no discernable A-wave, therefore   no comment on diastolic function can be made.   7. There is moderate concentric left ventricular hypertrophy.   8. Severely dilated right atrium.   9. There is no evidence of pericardial effusion.  10. Mild mitral valve regurgitation.  11. Mild to moderate mitral annular calcification.  12. Thickening and calcification of the anterior and posterior mitral   valve leaflets.  13. Moderate tricuspid regurgitation.  14. Mild to moderate aortic regurgitation.  15. Mild to moderate aortic valve stenosis.  16. Estimated pulmonary artery systolic pressure is 36.2 mmHg assuming a   right atrial pressure of 8 mmHg, which is consistent with borderline   pulmonary hypertension.  17. Mildly dilated pulmonary artery.  18. Peak transaortic gradient equals 18.3 mmHg, mean transaortic gradient   equals 9.5 mmHg, the calculated aortic valve area equals 1.24 cm² by the   continuity equation consistent with moderate aortic stenosis.  19. Severely enlarged left atrium.     MD Bill Electronically signed on 11/30/2017 at 10:05:35 AM    < end of copied text >    MEDICATIONS:  MEDICATIONS  (STANDING):  ALBUTerol/ipratropium for Nebulization 3 milliLiter(s) Nebulizer every 6 hours  amylase/lipase/protease  (CREON  6,000 Units) 1 Capsule(s) Oral three times a day with meals  ascorbic acid 500 milliGRAM(s) Oral daily  calcium carbonate 1250 mG + Vitamin D (OsCal 500 + D) 1 Tablet(s) Oral two times a day  dextrose 5%. 1000 milliLiter(s) (50 mL/Hr) IV Continuous <Continuous>  dextrose 50% Injectable 12.5 Gram(s) IV Push once  dextrose 50% Injectable 25 Gram(s) IV Push once  dextrose 50% Injectable 25 Gram(s) IV Push once  diltiazem    Tablet 30 milliGRAM(s) Enteral Tube every 6 hours  heparin  Infusion.  Unit(s)/Hr (7 mL/Hr) IV Continuous <Continuous>  insulin lispro (HumaLOG) corrective regimen sliding scale   SubCutaneous every 6 hours  lidocaine   Patch 1 Patch Transdermal daily  methimazole 2.5 milliGRAM(s) Oral <User Schedule>  mirtazapine 15 milliGRAM(s) Oral at bedtime  potassium chloride    Tablet ER 20 milliEquivalent(s) Oral daily    MEDICATIONS  (PRN):  ALBUTerol    0.083% 2.5 milliGRAM(s) Nebulizer every 3 hours PRN Bronchospasm  dextrose Gel 1 Dose(s) Oral once PRN Blood Glucose LESS THAN 70 milliGRAM(s)/deciliter  glucagon  Injectable 1 milliGRAM(s) IntraMuscular once PRN Glucose LESS THAN 70 milligrams/deciliter  magnesium sulfate  IVPB 2 Gram(s) IV Intermittent once PRN magnesium <1.8  metoprolol    tartrate Injectable 5 milliGRAM(s) IV Push every 6 hours PRN For HR above 100  morphine  - Injectable 2 milliGRAM(s) IV Push every 6 hours PRN Tachypnea  ondansetron Injectable 4 milliGRAM(s) IV Push every 6 hours PRN Nausea      ASSESSMENT AND PLAN:    89y Female with past medical history significant for atrial fibrillation, ILD, DM, HFpEF who presents with c/o progressively enlarging abd girth over 1 yr, s/p colonoscopy 10/19/17, found to have tubular adenoma at ileocecal valve and villous 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   -  Will add lasix 40 mg daily for fluid management.

## 2017-12-04 NOTE — PROGRESS NOTE ADULT - SUBJECTIVE AND OBJECTIVE BOX
NEPHROLOGY INTERVAL HPI/OVERNIGHT EVENTS: No new events this am. In bed  on nasal 02, in  no   distress    MEDICATIONS  (STANDING):  ALBUTerol/ipratropium for Nebulization 3 milliLiter(s) Nebulizer every 6 hours  alvimopan 12 milliGRAM(s) Oral two times a day  amylase/lipase/protease  (CREON  6,000 Units) 1 Capsule(s) Oral three times a day with meals  ascorbic acid 500 milliGRAM(s) Oral daily  calcium carbonate 1250 mG + Vitamin D (OsCal 500 + D) 1 Tablet(s) Oral two times a day  dextrose 5%. 1000 milliLiter(s) (50 mL/Hr) IV Continuous <Continuous>  dextrose 50% Injectable 12.5 Gram(s) IV Push once  dextrose 50% Injectable 25 Gram(s) IV Push once  dextrose 50% Injectable 25 Gram(s) IV Push once  heparin  Infusion.  Unit(s)/Hr (7 mL/Hr) IV Continuous <Continuous>  insulin lispro (HumaLOG) corrective regimen sliding scale   SubCutaneous every 6 hours  lidocaine   Patch 1 Patch Transdermal daily  LORazepam   Injectable 0.5 milliGRAM(s) IntraMuscular once  methimazole 2.5 milliGRAM(s) Oral <User Schedule>  mirtazapine 15 milliGRAM(s) Oral at bedtime  sodium chloride 0.9%. 1000 milliLiter(s) (80 mL/Hr) IV Continuous <Continuous>    MEDICATIONS  (PRN):  ALBUTerol    0.083% 2.5 milliGRAM(s) Nebulizer every 3 hours PRN Bronchospasm  ALPRAZolam 0.125 milliGRAM(s) Oral two times a day PRN anxiety  dextrose Gel 1 Dose(s) Oral once PRN Blood Glucose LESS THAN 70 milliGRAM(s)/deciliter  glucagon  Injectable 1 milliGRAM(s) IntraMuscular once PRN Glucose LESS THAN 70 milligrams/deciliter  magnesium sulfate  IVPB 2 Gram(s) IV Intermittent once PRN magnesium <1.8  metoprolol    tartrate Injectable 5 milliGRAM(s) IV Push every 6 hours PRN For HR above 100  ondansetron Injectable 4 milliGRAM(s) IV Push every 6 hours PRN Nausea      Allergies    Cipro (Unknown)  contrast media (iodine-based) (Unknown)  penicillins (Unknown)  shellfish (Unknown)  Valium (Unknown)    Intolerances        Vital Signs Last 24 Hrs    T(C): 37.2 (04 Dec 2017 03:53), Max: 37.7 (03 Dec 2017 20:00)  T(F): 99 (04 Dec 2017 03:53), Max: 99.9 (03 Dec 2017 20:00)  HR: 112 (04 Dec 2017 04:00) (89 - 115)  BP: 153/78 (04 Dec 2017 04:00) (116/65 - 153/78)  BP(mean): 99 (04 Dec 2017 04:00) (70 - 99)  RR: 25 (04 Dec 2017 04:00) (21 - 28)  SpO2: 95% (04 Dec 2017 04:00) (95% - 99%)    T(C): 36.2 (02 Dec 2017 05:06), Max: 37.7 (01 Dec 2017 20:49)  T(F): 97.2 (02 Dec 2017 05:06), Max: 99.9 (01 Dec 2017 20:49)  HR: 91 (02 Dec 2017 04:22) (80 - 119)  BP: 144/68 (02 Dec 2017 04:22) (136/62 - 159/75)  BP(mean): 81 (01 Dec 2017 12:54) (81 - 81)  RR: 22 (02 Dec 2017 04:22) (22 - 28)  SpO2: 96% (02 Dec 2017 04:22) (93% - 100%)  T(C): 36.8 (01 Dec 2017 08:20), Max: 37.3 (30 Nov 2017 22:49)  T(F): 98.3 (01 Dec 2017 08:20), Max: 99.1 (30 Nov 2017 22:49)  HR: 75 (01 Dec 2017 04:31) (75 - 89)  BP: 143/66 (01 Dec 2017 04:31) (128/68 - 151/72)  BP(mean): --  RR: 22 (01 Dec 2017 04:31) (13 - 25)  SpO2: 100% (01 Dec 2017 04:31) (95% - 100%)      PHYSICAL EXAM:    GENERAL: appears chronically ill  HEAD:  wnl  EYES:  wnl  ENMT:   NECK: veins not  distended  NERVOUS SYSTEM:  awake, moans frequently  CHEST/LUNG:  no wheezes, no rapid respiration  HEART: regular rate, no gallop or rub  ABDOMEN: soft, wound clean, no vac, soft, tube noted  EXTREMITIES: no edema  LYMPH:   SKIN: no rash   neg      LABS:12-04    145  |  99  |  36.0<H>  ----------------------------<  213<H>  3.5   |  31.0<H>  |  1.11    Ca    9.1      04 Dec 2017 06:53  Phos  2.4     12-04  Mg     2.0     12-04                                9.7    10.1  )-----------( 442      ( 01 Dec 2017 05:53 )             31.1     12-01    141  |  98  |  31.0<H>  ----------------------------<  194<H>  3.4<L>   |  26.0  |  1.19    Ca    8.8      01 Dec 2017 05:53  Phos  4.2     12-01  Mg     1.7     12-01    TPro  6.5<L>  /  Alb  3.2<L>  /  TBili  0.5  /  DBili  x   /  AST  22  /  ALT  20  /  AlkPhos  81  11-30    PT/INR - ( 01 Dec 2017 05:53 )   PT: 15.8 sec;   INR: 1.43 ratio         PTT - ( 01 Dec 2017 05:53 )  PTT:>200.0 sec    Magnesium, Serum: 1.7 mg/dL (12-01 @ 05:53)  Phosphorus Level, Serum: 4.2 mg/dL (12-01 @ 05:53)  Magnesium, Serum: 1.9 mg/dL (12-01 @ 00:58)    ABG - ( 01 Dec 2017 04:31 )  pH: 7.57  /  pCO2: 32    /  pO2: 83    / HCO3: 31    / Base Excess: x     /  SaO2: 97                    RADIOLOGY & ADDITIONAL TESTS:

## 2017-12-04 NOTE — PROGRESS NOTE ADULT - ASSESSMENT
Mild CM  VHD with Mild MR/ mild to moderate AI/ moderate AS  -Borderline pulm HTN  -Mild COPD; does not appear to be in A/E  -New CVA  -? component of CHF given elevated BNP and CXR findings  -S/p bowel resection for obstruction. colon CA  -Respiratory alkalosis of unclear etiology but may be due to hyperventilation from anxiety or recent CVA    Plan:  Neurology f/u for new CVA. On heparin gtt. Drug nebs for h/o mild COPD. Optimize cardiac function. Diurese as tolerate given VHD and elevated BNP  Follow BNP and CXR for improvement. Anxiolytics as needed. Prognosis poor. Will follow prn.

## 2017-12-04 NOTE — PROGRESS NOTE ADULT - GASTROINTESTINAL DETAILS
bowel sounds normal/nontender/abd protuberant/no guarding/no rebound tenderness/soft
no rebound tenderness/soft
no distention/soft/no guarding/bowel sounds normal/no rebound tenderness/nontender

## 2017-12-04 NOTE — PROGRESS NOTE ADULT - PROBLEM SELECTOR PROBLEM 1
Stroke
Toxic encephalopathy
Malignant neoplasm of descending colon

## 2017-12-05 LAB
APTT BLD: 27.7 SEC — SIGNIFICANT CHANGE UP (ref 27.5–37.4)
APTT BLD: 41.6 SEC — HIGH (ref 27.5–37.4)
APTT BLD: 44.5 SEC — HIGH (ref 27.5–37.4)
APTT BLD: 48.1 SEC — HIGH (ref 27.5–37.4)
BASE EXCESS BLDA CALC-SCNC: 11.6 MMOL/L — HIGH (ref -2–2)
BLOOD GAS COMMENTS ARTERIAL: SIGNIFICANT CHANGE UP
GAS PNL BLDA: SIGNIFICANT CHANGE UP
GLUCOSE BLDC GLUCOMTR-MCNC: 196 MG/DL — HIGH (ref 70–99)
GLUCOSE BLDC GLUCOMTR-MCNC: 220 MG/DL — HIGH (ref 70–99)
GLUCOSE BLDC GLUCOMTR-MCNC: 236 MG/DL — HIGH (ref 70–99)
GLUCOSE BLDC GLUCOMTR-MCNC: 253 MG/DL — HIGH (ref 70–99)
GLUCOSE BLDC GLUCOMTR-MCNC: 262 MG/DL — HIGH (ref 70–99)
HCO3 BLDA-SCNC: 35 MMOL/L — HIGH (ref 20–26)
HCT VFR BLD CALC: 32.4 % — LOW (ref 37–47)
HGB BLD-MCNC: 9.7 G/DL — LOW (ref 12–16)
HOROWITZ INDEX BLDA+IHG-RTO: SIGNIFICANT CHANGE UP
MCHC RBC-ENTMCNC: 21.7 PG — LOW (ref 27–31)
MCHC RBC-ENTMCNC: 29.9 G/DL — LOW (ref 32–36)
MCV RBC AUTO: 72.6 FL — LOW (ref 81–99)
PCO2 BLDA: 48 MMHG — HIGH (ref 35–45)
PH BLDA: 7.49 — HIGH (ref 7.35–7.45)
PLATELET # BLD AUTO: 368 K/UL — SIGNIFICANT CHANGE UP (ref 150–400)
PO2 BLDA: 77 MMHG — LOW (ref 83–108)
RBC # BLD: 4.46 M/UL — SIGNIFICANT CHANGE UP (ref 4.4–5.2)
RBC # FLD: 23.1 % — HIGH (ref 11–15.6)
SAO2 % BLDA: 96 % — SIGNIFICANT CHANGE UP (ref 95–99)
SURGICAL PATHOLOGY FINAL REPORT - CH: SIGNIFICANT CHANGE UP
WBC # BLD: 21.2 K/UL — HIGH (ref 4.8–10.8)
WBC # FLD AUTO: 21.2 K/UL — HIGH (ref 4.8–10.8)

## 2017-12-05 PROCEDURE — 99222 1ST HOSP IP/OBS MODERATE 55: CPT

## 2017-12-05 PROCEDURE — 71010: CPT | Mod: 26

## 2017-12-05 RX ORDER — FUROSEMIDE 40 MG
20 TABLET ORAL EVERY 12 HOURS
Qty: 0 | Refills: 0 | Status: DISCONTINUED | OUTPATIENT
Start: 2017-12-05 | End: 2017-12-06

## 2017-12-05 RX ORDER — ATROPINE SULFATE 1 %
2 DROPS OPHTHALMIC (EYE) EVERY 6 HOURS
Qty: 0 | Refills: 0 | Status: DISCONTINUED | OUTPATIENT
Start: 2017-12-05 | End: 2017-12-05

## 2017-12-05 RX ORDER — CEFTRIAXONE 500 MG/1
1 INJECTION, POWDER, FOR SOLUTION INTRAMUSCULAR; INTRAVENOUS EVERY 24 HOURS
Qty: 0 | Refills: 0 | Status: DISCONTINUED | OUTPATIENT
Start: 2017-12-06 | End: 2017-12-06

## 2017-12-05 RX ORDER — CEFTRIAXONE 500 MG/1
1 INJECTION, POWDER, FOR SOLUTION INTRAMUSCULAR; INTRAVENOUS ONCE
Qty: 0 | Refills: 0 | Status: COMPLETED | OUTPATIENT
Start: 2017-12-05 | End: 2017-12-05

## 2017-12-05 RX ORDER — ATROPINE SULFATE 1 %
2 DROPS OPHTHALMIC (EYE) EVERY 6 HOURS
Qty: 0 | Refills: 0 | Status: DISCONTINUED | OUTPATIENT
Start: 2017-12-05 | End: 2017-12-06

## 2017-12-05 RX ORDER — FUROSEMIDE 40 MG
40 TABLET ORAL ONCE
Qty: 0 | Refills: 0 | Status: COMPLETED | OUTPATIENT
Start: 2017-12-05 | End: 2017-12-05

## 2017-12-05 RX ORDER — LABETALOL HCL 100 MG
10 TABLET ORAL EVERY 6 HOURS
Qty: 0 | Refills: 0 | Status: DISCONTINUED | OUTPATIENT
Start: 2017-12-05 | End: 2017-12-06

## 2017-12-05 RX ORDER — CEFTRIAXONE 500 MG/1
INJECTION, POWDER, FOR SOLUTION INTRAMUSCULAR; INTRAVENOUS
Qty: 0 | Refills: 0 | Status: DISCONTINUED | OUTPATIENT
Start: 2017-12-05 | End: 2017-12-06

## 2017-12-05 RX ADMIN — Medication 500 MILLIGRAM(S): at 12:36

## 2017-12-05 RX ADMIN — Medication 3 MILLILITER(S): at 15:34

## 2017-12-05 RX ADMIN — MIRTAZAPINE 15 MILLIGRAM(S): 45 TABLET, ORALLY DISINTEGRATING ORAL at 23:19

## 2017-12-05 RX ADMIN — Medication 1 CAPSULE(S): at 12:37

## 2017-12-05 RX ADMIN — MORPHINE SULFATE 2 MILLIGRAM(S): 50 CAPSULE, EXTENDED RELEASE ORAL at 22:42

## 2017-12-05 RX ADMIN — Medication 6: at 12:48

## 2017-12-05 RX ADMIN — Medication 40 MILLIGRAM(S): at 21:11

## 2017-12-05 RX ADMIN — Medication 1 TABLET(S): at 05:01

## 2017-12-05 RX ADMIN — ALBUTEROL 2.5 MILLIGRAM(S): 90 AEROSOL, METERED ORAL at 12:33

## 2017-12-05 RX ADMIN — Medication 20 MILLIEQUIVALENT(S): at 12:37

## 2017-12-05 RX ADMIN — HEPARIN SODIUM 1100 UNIT(S)/HR: 5000 INJECTION INTRAVENOUS; SUBCUTANEOUS at 01:24

## 2017-12-05 RX ADMIN — Medication 1 CAPSULE(S): at 18:31

## 2017-12-05 RX ADMIN — MORPHINE SULFATE 2 MILLIGRAM(S): 50 CAPSULE, EXTENDED RELEASE ORAL at 22:14

## 2017-12-05 RX ADMIN — ALBUTEROL 2.5 MILLIGRAM(S): 90 AEROSOL, METERED ORAL at 19:10

## 2017-12-05 RX ADMIN — HEPARIN SODIUM 1200 UNIT(S)/HR: 5000 INJECTION INTRAVENOUS; SUBCUTANEOUS at 23:18

## 2017-12-05 RX ADMIN — Medication 2: at 23:49

## 2017-12-05 RX ADMIN — Medication 1 CAPSULE(S): at 07:48

## 2017-12-05 RX ADMIN — Medication 3 MILLILITER(S): at 08:45

## 2017-12-05 RX ADMIN — Medication 5 MILLIGRAM(S): at 16:14

## 2017-12-05 RX ADMIN — HEPARIN SODIUM 1200 UNIT(S)/HR: 5000 INJECTION INTRAVENOUS; SUBCUTANEOUS at 15:49

## 2017-12-05 RX ADMIN — CEFTRIAXONE 100 GRAM(S): 500 INJECTION, POWDER, FOR SOLUTION INTRAMUSCULAR; INTRAVENOUS at 13:16

## 2017-12-05 RX ADMIN — MORPHINE SULFATE 2 MILLIGRAM(S): 50 CAPSULE, EXTENDED RELEASE ORAL at 07:46

## 2017-12-05 RX ADMIN — Medication 4: at 05:01

## 2017-12-05 RX ADMIN — INSULIN GLARGINE 20 UNIT(S): 100 INJECTION, SOLUTION SUBCUTANEOUS at 21:56

## 2017-12-05 RX ADMIN — Medication 1 TABLET(S): at 18:30

## 2017-12-05 RX ADMIN — Medication 6: at 18:30

## 2017-12-05 RX ADMIN — LIDOCAINE 1 PATCH: 4 CREAM TOPICAL at 05:02

## 2017-12-05 RX ADMIN — Medication 5 MILLIGRAM(S): at 12:34

## 2017-12-05 RX ADMIN — HEPARIN SODIUM 1200 UNIT(S)/HR: 5000 INJECTION INTRAVENOUS; SUBCUTANEOUS at 07:53

## 2017-12-05 RX ADMIN — Medication 40 MILLIGRAM(S): at 05:01

## 2017-12-05 NOTE — CONSULT NOTE ADULT - SUBJECTIVE AND OBJECTIVE BOX
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 Pt denies Nausea/vomiting, diarrhea. C/o constipation. Denies fever/chills.    Pt direct admission for operative intervention by Dr Flowers on .    Pt c/o SOB/dyspnea and mild vague chest discomfort upon admission to the floor, also c/o tender inner left lower leg, also c/o LE edema L > R and coldness of LE's    Pulmonary consult - Dr Bang,    Primary care consult - Dr Barbosa    Cardiology consult - Dr Bush (2017 15:13)      PAST MEDICAL & SURGICAL HISTORY:  Renal insufficiency  Malignant neoplasm of descending colon  Microcytic anemia  ILD (interstitial lung disease)  Tricuspid valve insufficiency, unspecified etiology  Mitral valve insufficiency, unspecified etiology  Aortic valve insufficiency, etiology of cardiac valve disease unspecified  IDDM (insulin dependent diabetes mellitus)  COPD, mild  Atrial fibrillation, unspecified type  Diabetes  Cardiomegaly  Congestive heart failure, unspecified congestive heart failure chronicity, unspecified congestive heart failure type  H/O: hysterectomy  History of laparoscopic cholecystectomy  H/O splenectomy      ROS:  No Heartburn, regurgitation, dysphagia, odynophagia.  No dyspepsia  No abdominal pain.    No Nausea, vomiting.  No Bleeding.  No hematemesis.   No diarrhea.    No hematochesia.  No weight loss, anorexia.  No edema.      MEDICATIONS  (STANDING):  ALBUTerol/ipratropium for Nebulization 3 milliLiter(s) Nebulizer every 6 hours  amylase/lipase/protease  (CREON  6,000 Units) 1 Capsule(s) Oral three times a day with meals  ascorbic acid 500 milliGRAM(s) Oral daily  calcium carbonate 1250 mG + Vitamin D (OsCal 500 + D) 1 Tablet(s) Oral two times a day  dextrose 5%. 1000 milliLiter(s) (50 mL/Hr) IV Continuous <Continuous>  dextrose 50% Injectable 12.5 Gram(s) IV Push once  dextrose 50% Injectable 25 Gram(s) IV Push once  dextrose 50% Injectable 25 Gram(s) IV Push once  diltiazem    Tablet 30 milliGRAM(s) Enteral Tube every 6 hours  furosemide    Tablet 40 milliGRAM(s) Oral daily  heparin  Infusion.  Unit(s)/Hr (7 mL/Hr) IV Continuous <Continuous>  insulin glargine Injectable (LANTUS) 20 Unit(s) SubCutaneous at bedtime  insulin lispro (HumaLOG) corrective regimen sliding scale   SubCutaneous every 6 hours  lidocaine   Patch 1 Patch Transdermal daily  methimazole 2.5 milliGRAM(s) Oral <User Schedule>  mirtazapine 15 milliGRAM(s) Oral at bedtime  potassium chloride    Tablet ER 20 milliEquivalent(s) Oral daily    MEDICATIONS  (PRN):  ALBUTerol    0.083% 2.5 milliGRAM(s) Nebulizer every 3 hours PRN Bronchospasm  dextrose Gel 1 Dose(s) Oral once PRN Blood Glucose LESS THAN 70 milliGRAM(s)/deciliter  glucagon  Injectable 1 milliGRAM(s) IntraMuscular once PRN Glucose LESS THAN 70 milligrams/deciliter  magnesium sulfate  IVPB 2 Gram(s) IV Intermittent once PRN magnesium <1.8  metoprolol    tartrate Injectable 5 milliGRAM(s) IV Push every 6 hours PRN For HR above 100  morphine  - Injectable 2 milliGRAM(s) IV Push every 6 hours PRN Tachypnea  ondansetron Injectable 4 milliGRAM(s) IV Push every 6 hours PRN Nausea      Allergies    Cipro (Unknown)  contrast media (iodine-based) (Unknown)  penicillins (Unknown)  shellfish (Unknown)  Valium (Unknown)    Intolerances        SOCIAL HISTORY:    ENDOSCOPIC/GI HISTORY:    FAMILY HISTORY:  No pertinent family history in first degree relatives      Vital Signs Last 24 Hrs  T(C): 36.6 (05 Dec 2017 08:39), Max: 37.6 (05 Dec 2017 00:00)  T(F): 97.9 (05 Dec 2017 08:39), Max: 99.7 (05 Dec 2017 00:00)  HR: 117 (05 Dec 2017 07:35) (94 - 117)  BP: 160/86 (05 Dec 2017 07:35) (105/75 - 164/90)  BP(mean): 108 (05 Dec 2017 04:00) (99 - 108)  RR: 28 (05 Dec 2017 07:35) (19 - 28)  SpO2: 95% (05 Dec 2017 07:35) (93% - 99%)    PHYSICAL EXAM:    GENERAL: NAD, well-groomed, well-developed  HEAD:  Atraumatic, Normocephalic  EYES: EOMI, PERRLA, conjunctiva and sclera clear  ENMT: No tonsillar erythema, exudates, or enlargement; Moist mucous membranes, Good dentition, No lesions  NECK: Supple, No JVD, Normal thyroid  CHEST/LUNG: Clear to percussion bilaterally; No rales, rhonchi, wheezing, or rubs  HEART: Regular rate and rhythm; No murmurs, rubs, or gallops  ABDOMEN: Soft, Nontender, Nondistended; Bowel sounds present  EXTREMITIES:  2+ Peripheral Pulses, No clubbing, cyanosis, or edema  LYMPH: No lymphadenopathy noted  SKIN: No rashes or lesions      LABS:                        9.7    21.2  )-----------( 368      ( 05 Dec 2017 07:22 )             32.4     12-04    145  |  99  |  36.0<H>  ----------------------------<  213<H>  3.5   |  31.0<H>  |  1.11    Ca    9.1      04 Dec 2017 06:53  Phos  2.4     12-  Mg     2.0     12-04      PT/INR - ( 03 Dec 2017 16:49 )   PT: 13.8 sec;   INR: 1.25 ratio         PTT - ( 05 Dec 2017 07:22 )  PTT:27.7 sec   Urinalysis Basic - ( 04 Dec 2017 07:06 )    Color: Yellow / Appearance: Clear / S.010 / pH: x  Gluc: x / Ketone: Negative  / Bili: Negative / Urobili: Negative mg/dL   Blood: x / Protein: 30 mg/dL / Nitrite: Negative   Leuk Esterase: Moderate / RBC: 3-5 /HPF / WBC >50   Sq Epi: x / Non Sq Epi: Few / Bacteria: Few              RADIOLOGY & ADDITIONAL STUDIES: HPI:  88 yo female s/p sigmoid colon resection and now with acute CVA. She is getting NG tube feeding. She is being evaluated by the speech swallow team. She received morphine for her tachypnea and tachycardia today and was not able to participate in that today. As per the family at bed side, patient  and daughter, she is mildly improving. She is on IV heparin infusion due to embolic CVA.     Patient not able to give any history as she received morphine.     PAST MEDICAL & SURGICAL HISTORY:  Renal insufficiency  Malignant neoplasm of descending colon  Microcytic anemia  ILD (interstitial lung disease)  Tricuspid valve insufficiency, unspecified etiology  Mitral valve insufficiency, unspecified etiology  Aortic valve insufficiency, etiology of cardiac valve disease unspecified  IDDM (insulin dependent diabetes mellitus)  COPD, mild  Atrial fibrillation, unspecified type  Diabetes  Cardiomegaly  Congestive heart failure, unspecified congestive heart failure chronicity, unspecified congestive heart failure type  H/O: hysterectomy  History of laparoscopic cholecystectomy  H/O splenectomy      ROS:  Could not obtain    MEDICATIONS  (STANDING):  ALBUTerol/ipratropium for Nebulization 3 milliLiter(s) Nebulizer every 6 hours  amylase/lipase/protease  (CREON  6,000 Units) 1 Capsule(s) Oral three times a day with meals  ascorbic acid 500 milliGRAM(s) Oral daily  calcium carbonate 1250 mG + Vitamin D (OsCal 500 + D) 1 Tablet(s) Oral two times a day  dextrose 5%. 1000 milliLiter(s) (50 mL/Hr) IV Continuous <Continuous>  dextrose 50% Injectable 12.5 Gram(s) IV Push once  dextrose 50% Injectable 25 Gram(s) IV Push once  dextrose 50% Injectable 25 Gram(s) IV Push once  diltiazem    Tablet 30 milliGRAM(s) Enteral Tube every 6 hours  furosemide    Tablet 40 milliGRAM(s) Oral daily  heparin  Infusion.  Unit(s)/Hr (7 mL/Hr) IV Continuous <Continuous>  insulin glargine Injectable (LANTUS) 20 Unit(s) SubCutaneous at bedtime  insulin lispro (HumaLOG) corrective regimen sliding scale   SubCutaneous every 6 hours  lidocaine   Patch 1 Patch Transdermal daily  methimazole 2.5 milliGRAM(s) Oral <User Schedule>  mirtazapine 15 milliGRAM(s) Oral at bedtime  potassium chloride    Tablet ER 20 milliEquivalent(s) Oral daily    MEDICATIONS  (PRN):  ALBUTerol    0.083% 2.5 milliGRAM(s) Nebulizer every 3 hours PRN Bronchospasm  dextrose Gel 1 Dose(s) Oral once PRN Blood Glucose LESS THAN 70 milliGRAM(s)/deciliter  glucagon  Injectable 1 milliGRAM(s) IntraMuscular once PRN Glucose LESS THAN 70 milligrams/deciliter  magnesium sulfate  IVPB 2 Gram(s) IV Intermittent once PRN magnesium <1.8  metoprolol    tartrate Injectable 5 milliGRAM(s) IV Push every 6 hours PRN For HR above 100  morphine  - Injectable 2 milliGRAM(s) IV Push every 6 hours PRN Tachypnea  ondansetron Injectable 4 milliGRAM(s) IV Push every 6 hours PRN Nausea      Allergies    Cipro (Unknown)  contrast media (iodine-based) (Unknown)  penicillins (Unknown)  shellfish (Unknown)  Valium (Unknown)    Intolerances        SOCIAL HISTORY:    ENDOSCOPIC/GI HISTORY:    FAMILY HISTORY:  No pertinent family history in first degree relatives      Vital Signs Last 24 Hrs  T(C): 36.6 (05 Dec 2017 08:39), Max: 37.6 (05 Dec 2017 00:00)  T(F): 97.9 (05 Dec 2017 08:39), Max: 99.7 (05 Dec 2017 00:00)  HR: 117 (05 Dec 2017 07:35) (94 - 117)  BP: 160/86 (05 Dec 2017 07:35) (105/75 - 164/90)  BP(mean): 108 (05 Dec 2017 04:00) (99 - 108)  RR: 28 (05 Dec 2017 07:35) (19 - 28)  SpO2: 95% (05 Dec 2017 07:35) (93% - 99%)    PHYSICAL EXAM:    GENERAL: NAD, well-groomed, well-developed  HEAD:  Atraumatic, Normocephalic  EYES: EOMI, PERRLA, conjunctiva and sclera clear  ENMT: No tonsillar erythema, exudates, or enlargement; Moist mucous membranes, Good dentition, No lesions  NECK: Supple, No JVD, Normal thyroid  CHEST/LUNG: Clear to percussion bilaterally; No rales, rhonchi, wheezing, or rubs  HEART: Regular rate and rhythm; No murmurs, rubs, or gallops  ABDOMEN: Soft, Nontender, Nondistended; Bowel sounds present  EXTREMITIES:  2+ Peripheral Pulses, No clubbing, cyanosis, or edema  LYMPH: No lymphadenopathy noted  SKIN: No rashes or lesions      LABS:                        9.7    21.2  )-----------( 368      ( 05 Dec 2017 07:22 )             32.4     12-04    145  |  99  |  36.0<H>  ----------------------------<  213<H>  3.5   |  31.0<H>  |  1.11    Ca    9.1      04 Dec 2017 06:53  Phos  2.4     12-  Mg     2.0     12-      PT/INR - ( 03 Dec 2017 16:49 )   PT: 13.8 sec;   INR: 1.25 ratio         PTT - ( 05 Dec 2017 07:22 )  PTT:27.7 sec   Urinalysis Basic - ( 04 Dec 2017 07:06 )    Color: Yellow / Appearance: Clear / S.010 / pH: x  Gluc: x / Ketone: Negative  / Bili: Negative / Urobili: Negative mg/dL   Blood: x / Protein: 30 mg/dL / Nitrite: Negative   Leuk Esterase: Moderate / RBC: 3-5 /HPF / WBC >50   Sq Epi: x / Non Sq Epi: Few / Bacteria: Few              RADIOLOGY & ADDITIONAL STUDIES: HPI:  88 yo female s/p sigmoid colon resection and now with acute CVA. She is getting NG tube feeding. She is being evaluated by the speech swallow team. She received morphine for her tachypnea and tachycardia today and was not able to participate in that assessment today. As per the family at bed side, patient  and daughter, she is mildly improving. She is on IV heparin infusion due to embolic CVA.     Patient not able to give any history as she received morphine.     PAST MEDICAL & SURGICAL HISTORY:  Renal insufficiency  Malignant neoplasm of descending colon  Microcytic anemia  ILD (interstitial lung disease)  Tricuspid valve insufficiency, unspecified etiology  Mitral valve insufficiency, unspecified etiology  Aortic valve insufficiency, etiology of cardiac valve disease unspecified  IDDM (insulin dependent diabetes mellitus)  COPD, mild  Atrial fibrillation, unspecified type  Diabetes  Cardiomegaly  Congestive heart failure, unspecified congestive heart failure chronicity, unspecified congestive heart failure type  H/O: hysterectomy  History of laparoscopic cholecystectomy  H/O splenectomy      ROS:  Could not obtain    MEDICATIONS  (STANDING):  ALBUTerol/ipratropium for Nebulization 3 milliLiter(s) Nebulizer every 6 hours  amylase/lipase/protease  (CREON  6,000 Units) 1 Capsule(s) Oral three times a day with meals  ascorbic acid 500 milliGRAM(s) Oral daily  calcium carbonate 1250 mG + Vitamin D (OsCal 500 + D) 1 Tablet(s) Oral two times a day  dextrose 5%. 1000 milliLiter(s) (50 mL/Hr) IV Continuous <Continuous>  dextrose 50% Injectable 12.5 Gram(s) IV Push once  dextrose 50% Injectable 25 Gram(s) IV Push once  dextrose 50% Injectable 25 Gram(s) IV Push once  diltiazem    Tablet 30 milliGRAM(s) Enteral Tube every 6 hours  furosemide    Tablet 40 milliGRAM(s) Oral daily  heparin  Infusion.  Unit(s)/Hr (7 mL/Hr) IV Continuous <Continuous>  insulin glargine Injectable (LANTUS) 20 Unit(s) SubCutaneous at bedtime  insulin lispro (HumaLOG) corrective regimen sliding scale   SubCutaneous every 6 hours  lidocaine   Patch 1 Patch Transdermal daily  methimazole 2.5 milliGRAM(s) Oral <User Schedule>  mirtazapine 15 milliGRAM(s) Oral at bedtime  potassium chloride    Tablet ER 20 milliEquivalent(s) Oral daily    MEDICATIONS  (PRN):  ALBUTerol    0.083% 2.5 milliGRAM(s) Nebulizer every 3 hours PRN Bronchospasm  dextrose Gel 1 Dose(s) Oral once PRN Blood Glucose LESS THAN 70 milliGRAM(s)/deciliter  glucagon  Injectable 1 milliGRAM(s) IntraMuscular once PRN Glucose LESS THAN 70 milligrams/deciliter  magnesium sulfate  IVPB 2 Gram(s) IV Intermittent once PRN magnesium <1.8  metoprolol    tartrate Injectable 5 milliGRAM(s) IV Push every 6 hours PRN For HR above 100  morphine  - Injectable 2 milliGRAM(s) IV Push every 6 hours PRN Tachypnea  ondansetron Injectable 4 milliGRAM(s) IV Push every 6 hours PRN Nausea      Allergies    Cipro (Unknown)  contrast media (iodine-based) (Unknown)  penicillins (Unknown)  shellfish (Unknown)  Valium (Unknown)    Intolerances        SOCIAL HISTORY:NC	    FAMILY HISTORY:  No pertinent family history in first degree relatives      Vital Signs Last 24 Hrs  T(C): 36.6 (05 Dec 2017 08:39), Max: 37.6 (05 Dec 2017 00:00)  T(F): 97.9 (05 Dec 2017 08:39), Max: 99.7 (05 Dec 2017 00:00)  HR: 117 (05 Dec 2017 07:35) (94 - 117)  BP: 160/86 (05 Dec 2017 07:35) (105/75 - 164/90)  BP(mean): 108 (05 Dec 2017 04:00) (99 - 108)  RR: 28 (05 Dec 2017 07:35) (19 - 28)  SpO2: 95% (05 Dec 2017 07:35) (93% - 99%)    PHYSICAL EXAM:    GENERAL: NG tube in place.   HEAD:  Atraumatic, Normocephalic  EYES: EOMI, PERRLA, conjunctiva and sclera clear  ENMT: No tonsillar erythema, exudates, or enlargement; Moist mucous membranes, Good dentition, No lesions  NECK: Supple, No JVD, Normal thyroid  CHEST/LUNG: Clear to percussion bilaterally; No rales, rhonchi, wheezing, or rubs  HEART: Regular rate and rhythm; No murmurs, rubs, or gallops  ABDOMEN: Soft, Nontender, Nondistended; Bowel sounds present. laparotomy staples   EXTREMITIES:  2+ Peripheral Pulses, No clubbing, cyanosis, or edema  LYMPH: No lymphadenopathy noted  SKIN: No rashes or lesions      LABS:                        9.7    21.2  )-----------( 368      ( 05 Dec 2017 07:22 )             32.4     12-    145  |  99  |  36.0<H>  ----------------------------<  213<H>  3.5   |  31.0<H>  |  1.11    Ca    9.1      04 Dec 2017 06:53  Phos  2.4       Mg     2.0           PT/INR - ( 03 Dec 2017 16:49 )   PT: 13.8 sec;   INR: 1.25 ratio         PTT - ( 05 Dec 2017 07:22 )  PTT:27.7 sec   Urinalysis Basic - ( 04 Dec 2017 07:06 )    Color: Yellow / Appearance: Clear / S.010 / pH: x  Gluc: x / Ketone: Negative  / Bili: Negative / Urobili: Negative mg/dL   Blood: x / Protein: 30 mg/dL / Nitrite: Negative   Leuk Esterase: Moderate / RBC: 3-5 /HPF / WBC >50   Sq Epi: x / Non Sq Epi: Few / Bacteria: Few              RADIOLOGY & ADDITIONAL STUDIES:    < from: Xray Chest 1 View AP/PA. (17 @ 06:06) >    Impression:  Congestiveheart failure with bilateral pleural effusions. Stable exam   without significant change since the previous study..    < end of copied text >    < from: MR Head No Cont (17 @ 12:08) >    IMPRESSION: Acute small bilateral frontal lobe and right parietal lobe   embolic infarcts.    < end of copied text >

## 2017-12-05 NOTE — PROGRESS NOTE ADULT - ATTENDING COMMENTS
Follow up CBc, urine study.
Pt seen and examined.  In afib. Pending repeat speech and swallow eval. Ucx positive for GNR  incision CDI, soft, NT, mild distention  Labs reviewed  A/P -  Followup repeat speech and swallow eval to determine if PEG is required. Cont hep gtt until then  Will discuss with Dr. Edmond regarding choice of abx for UTI given cipro and PCN allergies.
IV  fluid, ua  today for Ph , IV K. Mag.
Labs this am.. IV  fluid with JOAO
Patient seen an examined. No more fevers. Normal WBC. On IV antibiotics. Creatinine slightly up today. Plan for surgery on Sunday with Dr. Flowers.
Patient seen and examined. Plan is for surgery tomorrow. Mechanical bowel prep today and oral antibiotic prep. Start gentle IVFs. NPO at midnight.
Pt seen and examined. Episodes of tachychardia and agitation overnight. Minimal NGT output. Reports passing some flatus,  AFVSS  NAD  provena in place, soft, NTND  A/P -  D/C NGT.  Start clear liquids.
Patient seen and examined. Rising creatinine- Renal input noted. May need to hold duiretic. Planning surgery on Sunday morning. Will start clear liquids and bowel prep slowly tomorrow
Patient seen and examined. Confused. Abdomen distended. NG in place. Incision covered with prevena. Continue bowel rest, VTE prophylaxis and activity. Await bowel function

## 2017-12-05 NOTE — PROGRESS NOTE ADULT - SUBJECTIVE AND OBJECTIVE BOX
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 Pt denies Nausea/vomiting, diarrhea. C/o constipation. Denies fever/chills.    Pt direct admission for operative intervention by Dr Flowers on .    Pt c/o SOB/dyspnea and mild vague chest discomfort upon admission to the floor, also c/o tender inner left lower leg, also c/o LE edema L > R and coldness of LE's    Pulmonary consult - Dr Bang,    Primary care consult - Dr Barbosa    Cardiology consult - Dr Bush (2017 15:13)     Allergies    Cipro (Unknown)  contrast media (iodine-based) (Unknown)  penicillins (Unknown)  shellfish (Unknown)  Valium (Unknown)    Intolerances      Renal insufficiency  Malignant neoplasm of descending colon  Microcytic anemia  ILD (interstitial lung disease)  Tricuspid valve insufficiency, unspecified etiology  Mitral valve insufficiency, unspecified etiology  Aortic valve insufficiency, etiology of cardiac valve disease unspecified  IDDM (insulin dependent diabetes mellitus)  COPD, mild  Atrial fibrillation, unspecified type  Diabetes  Cardiomegaly  Congestive heart failure, unspecified congestive heart failure chronicity, unspecified congestive heart failure type    MEDICATIONS  (STANDING):  ALBUTerol/ipratropium for Nebulization 3 milliLiter(s) Nebulizer every 6 hours  amylase/lipase/protease  (CREON  6,000 Units) 1 Capsule(s) Oral three times a day with meals  ascorbic acid 500 milliGRAM(s) Oral daily  atropine 1% Ophthalmic Solution for SubLingual Use 2 Drop(s) SubLingual every 6 hours  calcium carbonate 1250 mG + Vitamin D (OsCal 500 + D) 1 Tablet(s) Oral two times a day  cefTRIAXone   IVPB      dextrose 5%. 1000 milliLiter(s) (50 mL/Hr) IV Continuous <Continuous>  dextrose 50% Injectable 12.5 Gram(s) IV Push once  dextrose 50% Injectable 25 Gram(s) IV Push once  dextrose 50% Injectable 25 Gram(s) IV Push once  diltiazem    Tablet 30 milliGRAM(s) Enteral Tube every 6 hours  furosemide    Tablet 40 milliGRAM(s) Oral daily  furosemide   Injectable 40 milliGRAM(s) IV Push once  heparin  Infusion.  Unit(s)/Hr (7 mL/Hr) IV Continuous <Continuous>  insulin glargine Injectable (LANTUS) 20 Unit(s) SubCutaneous at bedtime  insulin lispro (HumaLOG) corrective regimen sliding scale   SubCutaneous every 6 hours  lidocaine   Patch 1 Patch Transdermal daily  methimazole 2.5 milliGRAM(s) Oral <User Schedule>  mirtazapine 15 milliGRAM(s) Oral at bedtime  potassium chloride    Tablet ER 20 milliEquivalent(s) Oral daily    MEDICATIONS  (PRN):  ALBUTerol    0.083% 2.5 milliGRAM(s) Nebulizer every 3 hours PRN Bronchospasm  dextrose Gel 1 Dose(s) Oral once PRN Blood Glucose LESS THAN 70 milliGRAM(s)/deciliter  glucagon  Injectable 1 milliGRAM(s) IntraMuscular once PRN Glucose LESS THAN 70 milligrams/deciliter  magnesium sulfate  IVPB 2 Gram(s) IV Intermittent once PRN magnesium <1.8  metoprolol    tartrate Injectable 5 milliGRAM(s) IV Push every 6 hours PRN For HR above 100  morphine  - Injectable 2 milliGRAM(s) IV Push every 6 hours PRN Tachypnea  ondansetron Injectable 4 milliGRAM(s) IV Push every 6 hours PRN Nausea                           9.7    21.2  )-----------( 368      ( 05 Dec 2017 07:22 )             32.4     12-04    145  |  99  |  36.0<H>  ----------------------------<  213<H>  3.5   |  31.0<H>  |  1.11    Ca    9.1      04 Dec 2017 06:53  Phos  2.4     12-  Mg     2.0     12-04      PTT - ( 05 Dec 2017 14:20 )  PTT:41.6 sec  Urinalysis Basic - ( 04 Dec 2017 07:06 )    Color: Yellow / Appearance: Clear / S.010 / pH: x  Gluc: x / Ketone: Negative  / Bili: Negative / Urobili: Negative mg/dL   Blood: x / Protein: 30 mg/dL / Nitrite: Negative   Leuk Esterase: Moderate / RBC: 3-5 /HPF / WBC >50   Sq Epi: x / Non Sq Epi: Few / Bacteria: Few    ;  Vital Signs Last 24 Hrs  T(C): 37 (05 Dec 2017 20:01), Max: 37.6 (05 Dec 2017 00:00)  T(F): 98.6 (05 Dec 2017 20:01), Max: 99.7 (05 Dec 2017 00:00)  HR: 123 (05 Dec 2017 20:00) (94 - 123)  BP: 170/86 (05 Dec 2017 20:00) (143/89 - 172/74)  BP(mean): 111 (05 Dec 2017 20:00) (99 - 111)  RR: 35 (05 Dec 2017 20:00) (20 - 35)  SpO2: 96% (05 Dec 2017 20:00) (93% - 98%)  CAPILLARY BLOOD GLUCOSE       @ :  -   @ 07:00  --------------------------------------------------------  IN: 2491 mL / OUT: 0 mL / NET: 2491 mL     @ 07:  -   @ 20:55  --------------------------------------------------------  IN: 1284 mL / OUT: 0 mL / NET: 1284 mL      Patient uncomfortable occasional verbal response +SOB    HEENT: PEARLA  NGT  Neck: Supple +JVD  Cardio: S1 S2  TRISH Irregular  Pulm: CTA Upper Lower lobes Low course crackles   Ext: No DCT  Skin: No Rash  Neuro: somulent Non Verbal 5/5 power follows some commands      Leukocytosis - workup in progress, U/A, CXR- clear  Acute CVA -Heparin Neuro coumadin once family decides on PEG if swallowing doesn't improve.  Dysphagia - NGT and poss Feeding if Ok with surgery CT- contrast throughout bowel  Acute Congestive heart failure -IV Lasix given mild congestion on CXR  COPD - increase neb frequency with cont o2  Malignant neoplasm of descending colon - Pain control await bowel fxn, NGT- for decompression  Renal insufficiency - Nephology following   ILD (interstitial lung disease) - Pulm  Multivalvular Dz - Cardiology  IDDM (insulin dependent diabetes mellitus) -   lantus change heparin to saline  Atrial fibrillation - Rate controlled    Spoke with Family about end of life pallaitive care family refusing Family aware may need intubation does not want morphine or sedation given  Spoke with Surgical ICU PA prognosis grim

## 2017-12-05 NOTE — PROGRESS NOTE ADULT - SUBJECTIVE AND OBJECTIVE BOX
Hospital Day #  POD # 9 s/p laparoscopic RUTHANN, sigmoid colon resection          s/p acute stroke 11/30  IV: SL  diet: NGT feedings in progress and tolerating at present   Patient: afebrile, awake does response to voice turns to you ; VS noted intermittent runs of tachycardia ( rate between 94 - 114 ) a- fib , general condition with no acute changes. speech ans swallow in progress ( pending results to decide rec. for peg feeding tube or not )     T(C): 36.6 (12-05-17 @ 08:39), Max: 37.6 (12-05-17 @ 00:00)  HR: 117 (12-05-17 @ 07:35) (94 - 117)  BP: 160/86 (12-05-17 @ 07:35) (105/75 - 164/90)  RR: 28 (12-05-17 @ 07:35) (19 - 28)  SpO2: 95% (12-05-17 @ 07:35) (93% - 99%)  Wt(kg): --  chest:  noted some scattered crackles breathing rate at about 25 at present no evidence respiratory distress.   Abdomen: soft distended, ( large beach ball ) apparently  stated chronically like that ) surgical site clean and dry skin clips in place . + BS no bm yet today, reported bm yesterday.   output: voiding adequate- reported incontinent  Extremities: warm to toes                           9.7    21.2  )-----------( 368      ( 05 Dec 2017 07:22 )             32.4       12-04    145  |  99  |  36.0<H>  ----------------------------<  213<H>  3.5   |  31.0<H>  |  1.11    Ca    9.1      04 Dec 2017 06:53  Phos  2.4     12-04  Mg     2.0     12-04        PT/INR - ( 03 Dec 2017 16:49 )   PT: 13.8 sec;   INR: 1.25 ratio         PTT - ( 05 Dec 2017 07:22 )  PTT:27.7 sec    xrays:    PAST MEDICAL & SURGICAL HISTORY:  Renal insufficiency  Malignant neoplasm of descending colon  Microcytic anemia  ILD (interstitial lung disease)  Tricuspid valve insufficiency, unspecified etiology  Mitral valve insufficiency, unspecified etiology  Aortic valve insufficiency, etiology of cardiac valve disease unspecified  IDDM (insulin dependent diabetes mellitus)  COPD, mild  Atrial fibrillation, unspecified type  Diabetes  Cardiomegaly  Congestive heart failure, unspecified congestive heart failure chronicity, unspecified congestive heart failure type  H/O: hysterectomy  History of laparoscopic cholecystectomy  H/O splenectomy    discuss with Surgeon present situation and continued care and management     Impression: no acute changes, POD # 9 surgery, s/p day 5 acute stroke;  speech and swallow to return today ( test in progress ) awaiting results then to discuss definitive recommendations if necessary  for Peg- feeding tube or not, then may start anticoagulation therapy.  surgical site clean and stable, tolerating NGT feedings at present, mild elevation wbc ( possibly due to mild pulmonary congestion/ effusion )    Plan: continue present care and management follow closely awaiting results of speech and swallow then discuss definitive management.  Continue general care with medicine, neuro, cardiac, and pulm..

## 2017-12-06 LAB
-  AMIKACIN: SIGNIFICANT CHANGE UP
-  AZTREONAM: SIGNIFICANT CHANGE UP
-  CEFEPIME: SIGNIFICANT CHANGE UP
-  CEFTAZIDIME: SIGNIFICANT CHANGE UP
-  CIPROFLOXACIN: SIGNIFICANT CHANGE UP
-  GENTAMICIN: SIGNIFICANT CHANGE UP
-  IMIPENEM: SIGNIFICANT CHANGE UP
-  LEVOFLOXACIN: SIGNIFICANT CHANGE UP
-  MEROPENEM: SIGNIFICANT CHANGE UP
-  PIPERACILLIN/TAZOBACTAM: SIGNIFICANT CHANGE UP
-  TOBRAMYCIN: SIGNIFICANT CHANGE UP
ALBUMIN SERPL ELPH-MCNC: 3.1 G/DL — LOW (ref 3.3–5.2)
ALBUMIN SERPL ELPH-MCNC: 3.4 G/DL — SIGNIFICANT CHANGE UP (ref 3.3–5.2)
ALP SERPL-CCNC: 104 U/L — SIGNIFICANT CHANGE UP (ref 40–120)
ALP SERPL-CCNC: 94 U/L — SIGNIFICANT CHANGE UP (ref 40–120)
ALT FLD-CCNC: 427 U/L — HIGH
ALT FLD-CCNC: 59 U/L — HIGH
ANION GAP SERPL CALC-SCNC: 15 MMOL/L — SIGNIFICANT CHANGE UP (ref 5–17)
ANION GAP SERPL CALC-SCNC: 19 MMOL/L — HIGH (ref 5–17)
ANISOCYTOSIS BLD QL: SLIGHT — SIGNIFICANT CHANGE UP
APTT BLD: 41.9 SEC — HIGH (ref 27.5–37.4)
AST SERPL-CCNC: 1041 U/L — HIGH
AST SERPL-CCNC: 77 U/L — HIGH
BASOPHILS # BLD AUTO: 0 K/UL — SIGNIFICANT CHANGE UP (ref 0–0.2)
BASOPHILS # BLD AUTO: 0 K/UL — SIGNIFICANT CHANGE UP (ref 0–0.2)
BASOPHILS NFR BLD AUTO: 0.3 % — SIGNIFICANT CHANGE UP (ref 0–2)
BILIRUB SERPL-MCNC: 0.6 MG/DL — SIGNIFICANT CHANGE UP (ref 0.4–2)
BILIRUB SERPL-MCNC: 0.8 MG/DL — SIGNIFICANT CHANGE UP (ref 0.4–2)
BUN SERPL-MCNC: 57 MG/DL — HIGH (ref 8–20)
BUN SERPL-MCNC: 67 MG/DL — HIGH (ref 8–20)
CALCIUM SERPL-MCNC: 9.3 MG/DL — SIGNIFICANT CHANGE UP (ref 8.6–10.2)
CALCIUM SERPL-MCNC: 9.5 MG/DL — SIGNIFICANT CHANGE UP (ref 8.6–10.2)
CHLORIDE SERPL-SCNC: 104 MMOL/L — SIGNIFICANT CHANGE UP (ref 98–107)
CHLORIDE SERPL-SCNC: 105 MMOL/L — SIGNIFICANT CHANGE UP (ref 98–107)
CHOLEST SERPL-MCNC: 98 MG/DL — LOW (ref 110–199)
CO2 SERPL-SCNC: 32 MMOL/L — HIGH (ref 22–29)
CO2 SERPL-SCNC: 34 MMOL/L — HIGH (ref 22–29)
CREAT SERPL-MCNC: 1.29 MG/DL — SIGNIFICANT CHANGE UP (ref 0.5–1.3)
CREAT SERPL-MCNC: 1.66 MG/DL — HIGH (ref 0.5–1.3)
CULTURE RESULTS: SIGNIFICANT CHANGE UP
ELLIPTOCYTES BLD QL SMEAR: SLIGHT — SIGNIFICANT CHANGE UP
EOSINOPHIL # BLD AUTO: 0 K/UL — SIGNIFICANT CHANGE UP (ref 0–0.5)
EOSINOPHIL # BLD AUTO: 0 K/UL — SIGNIFICANT CHANGE UP (ref 0–0.5)
EOSINOPHIL NFR BLD AUTO: 0 % — SIGNIFICANT CHANGE UP (ref 0–6)
GAS PNL BLDA: SIGNIFICANT CHANGE UP
GLUCOSE BLDC GLUCOMTR-MCNC: 126 MG/DL — HIGH (ref 70–99)
GLUCOSE BLDC GLUCOMTR-MCNC: 133 MG/DL — HIGH (ref 70–99)
GLUCOSE BLDC GLUCOMTR-MCNC: 140 MG/DL — HIGH (ref 70–99)
GLUCOSE SERPL-MCNC: 131 MG/DL — HIGH (ref 70–115)
GLUCOSE SERPL-MCNC: 151 MG/DL — HIGH (ref 70–115)
HCT VFR BLD CALC: 34.1 % — LOW (ref 37–47)
HCT VFR BLD CALC: 35.5 % — LOW (ref 37–47)
HDLC SERPL-MCNC: 57 MG/DL — LOW
HGB BLD-MCNC: 10.1 G/DL — LOW (ref 12–16)
HGB BLD-MCNC: 10.6 G/DL — LOW (ref 12–16)
HYPOCHROMIA BLD QL: SLIGHT — SIGNIFICANT CHANGE UP
LACTATE BLDV-MCNC: 2.3 MMOL/L — HIGH (ref 0.5–2)
LACTATE BLDV-MCNC: 2.7 MMOL/L — HIGH (ref 0.5–2)
LIPID PNL WITH DIRECT LDL SERPL: 28 MG/DL — SIGNIFICANT CHANGE UP
LYMPHOCYTES # BLD AUTO: 1.6 K/UL — SIGNIFICANT CHANGE UP (ref 1–4.8)
LYMPHOCYTES # BLD AUTO: 12 % — LOW (ref 20–55)
LYMPHOCYTES # BLD AUTO: 3.1 K/UL — SIGNIFICANT CHANGE UP (ref 1–4.8)
LYMPHOCYTES # BLD AUTO: 8.7 % — LOW (ref 20–55)
MACROCYTES BLD QL: SLIGHT — SIGNIFICANT CHANGE UP
MCHC RBC-ENTMCNC: 21.8 PG — LOW (ref 27–31)
MCHC RBC-ENTMCNC: 21.8 PG — LOW (ref 27–31)
MCHC RBC-ENTMCNC: 29.6 G/DL — LOW (ref 32–36)
MCHC RBC-ENTMCNC: 29.9 G/DL — LOW (ref 32–36)
MCV RBC AUTO: 73 FL — LOW (ref 81–99)
MCV RBC AUTO: 73.5 FL — LOW (ref 81–99)
METHOD TYPE: SIGNIFICANT CHANGE UP
MICROCYTES BLD QL: SLIGHT — SIGNIFICANT CHANGE UP
MONOCYTES # BLD AUTO: 2.4 K/UL — HIGH (ref 0–0.8)
MONOCYTES # BLD AUTO: 2.7 K/UL — HIGH (ref 0–0.8)
MONOCYTES NFR BLD AUTO: 12.7 % — HIGH (ref 3–10)
MONOCYTES NFR BLD AUTO: 14 % — HIGH (ref 3–10)
NEUTROPHILS # BLD AUTO: 14.4 K/UL — HIGH (ref 1.8–8)
NEUTROPHILS # BLD AUTO: 14.9 K/UL — HIGH (ref 1.8–8)
NEUTROPHILS NFR BLD AUTO: 73 % — SIGNIFICANT CHANGE UP (ref 37–73)
NEUTROPHILS NFR BLD AUTO: 77.8 % — HIGH (ref 37–73)
NEUTS BAND # BLD: 1 % — SIGNIFICANT CHANGE UP (ref 0–8)
NRBC # BLD: 7 /100 — HIGH (ref 0–0)
NT-PROBNP SERPL-SCNC: HIGH PG/ML (ref 0–300)
ORGANISM # SPEC MICROSCOPIC CNT: SIGNIFICANT CHANGE UP
ORGANISM # SPEC MICROSCOPIC CNT: SIGNIFICANT CHANGE UP
OVALOCYTES BLD QL SMEAR: SLIGHT — SIGNIFICANT CHANGE UP
PLAT MORPH BLD: NORMAL — SIGNIFICANT CHANGE UP
PLATELET # BLD AUTO: 417 K/UL — HIGH (ref 150–400)
PLATELET # BLD AUTO: 417 K/UL — HIGH (ref 150–400)
POIKILOCYTOSIS BLD QL AUTO: SLIGHT — SIGNIFICANT CHANGE UP
POLYCHROMASIA BLD QL SMEAR: SLIGHT — SIGNIFICANT CHANGE UP
POTASSIUM SERPL-MCNC: 4.4 MMOL/L — SIGNIFICANT CHANGE UP (ref 3.5–5.3)
POTASSIUM SERPL-MCNC: 5.3 MMOL/L — SIGNIFICANT CHANGE UP (ref 3.5–5.3)
POTASSIUM SERPL-SCNC: 4.4 MMOL/L — SIGNIFICANT CHANGE UP (ref 3.5–5.3)
POTASSIUM SERPL-SCNC: 5.3 MMOL/L — SIGNIFICANT CHANGE UP (ref 3.5–5.3)
PROCALCITONIN SERPL-MCNC: 1.26 NG/ML — HIGH (ref 0–0.04)
PROT SERPL-MCNC: 7.2 G/DL — SIGNIFICANT CHANGE UP (ref 6.6–8.7)
PROT SERPL-MCNC: 7.4 G/DL — SIGNIFICANT CHANGE UP (ref 6.6–8.7)
RBC # BLD: 4.64 M/UL — SIGNIFICANT CHANGE UP (ref 4.4–5.2)
RBC # BLD: 4.86 M/UL — SIGNIFICANT CHANGE UP (ref 4.4–5.2)
RBC # FLD: 22.4 % — HIGH (ref 11–15.6)
RBC # FLD: 22.8 % — HIGH (ref 11–15.6)
RBC BLD AUTO: ABNORMAL
SODIUM SERPL-SCNC: 154 MMOL/L — HIGH (ref 135–145)
SODIUM SERPL-SCNC: 155 MMOL/L — HIGH (ref 135–145)
SPECIMEN SOURCE: SIGNIFICANT CHANGE UP
TARGETS BLD QL SMEAR: SLIGHT — SIGNIFICANT CHANGE UP
TOTAL CHOLESTEROL/HDL RATIO MEASUREMENT: 2 RATIO — LOW (ref 3.3–7.1)
TRIGL SERPL-MCNC: 67 MG/DL — SIGNIFICANT CHANGE UP (ref 10–200)
TROPONIN T SERPL-MCNC: 0.16 NG/ML — HIGH (ref 0–0.06)
WBC # BLD: 18.5 K/UL — HIGH (ref 4.8–10.8)
WBC # BLD: 20.8 K/UL — HIGH (ref 4.8–10.8)
WBC # FLD AUTO: 18.5 K/UL — HIGH (ref 4.8–10.8)
WBC # FLD AUTO: 20.8 K/UL — HIGH (ref 4.8–10.8)

## 2017-12-06 PROCEDURE — 71010: CPT | Mod: 26

## 2017-12-06 PROCEDURE — 99232 SBSQ HOSP IP/OBS MODERATE 35: CPT

## 2017-12-06 PROCEDURE — 93010 ELECTROCARDIOGRAM REPORT: CPT

## 2017-12-06 RX ORDER — ATROPINE SULFATE 1 %
2 DROPS OPHTHALMIC (EYE) EVERY 6 HOURS
Qty: 0 | Refills: 0 | Status: DISCONTINUED | OUTPATIENT
Start: 2017-12-06 | End: 2017-12-07

## 2017-12-06 RX ORDER — MORPHINE SULFATE 50 MG/1
5 CAPSULE, EXTENDED RELEASE ORAL
Qty: 100 | Refills: 0 | Status: DISCONTINUED | OUTPATIENT
Start: 2017-12-06 | End: 2017-12-07

## 2017-12-06 RX ORDER — FUROSEMIDE 40 MG
20 TABLET ORAL DAILY
Qty: 0 | Refills: 0 | Status: DISCONTINUED | OUTPATIENT
Start: 2017-12-06 | End: 2017-12-06

## 2017-12-06 RX ORDER — SODIUM CHLORIDE 9 MG/ML
500 INJECTION INTRAMUSCULAR; INTRAVENOUS; SUBCUTANEOUS
Qty: 0 | Refills: 0 | Status: DISCONTINUED | OUTPATIENT
Start: 2017-12-06 | End: 2017-12-06

## 2017-12-06 RX ORDER — ACETAMINOPHEN 500 MG
1000 TABLET ORAL ONCE
Qty: 0 | Refills: 0 | Status: COMPLETED | OUTPATIENT
Start: 2017-12-06 | End: 2017-12-06

## 2017-12-06 RX ORDER — CEFEPIME 1 G/1
1000 INJECTION, POWDER, FOR SOLUTION INTRAMUSCULAR; INTRAVENOUS EVERY 24 HOURS
Qty: 0 | Refills: 0 | Status: DISCONTINUED | OUTPATIENT
Start: 2017-12-06 | End: 2017-12-06

## 2017-12-06 RX ORDER — FUROSEMIDE 40 MG
20 TABLET ORAL
Qty: 0 | Refills: 0 | Status: DISCONTINUED | OUTPATIENT
Start: 2017-12-06 | End: 2017-12-06

## 2017-12-06 RX ORDER — MORPHINE SULFATE 50 MG/1
5 CAPSULE, EXTENDED RELEASE ORAL
Qty: 250 | Refills: 0 | Status: DISCONTINUED | OUTPATIENT
Start: 2017-12-06 | End: 2017-12-06

## 2017-12-06 RX ORDER — MORPHINE SULFATE 50 MG/1
2 CAPSULE, EXTENDED RELEASE ORAL EVERY 4 HOURS
Qty: 0 | Refills: 0 | Status: DISCONTINUED | OUTPATIENT
Start: 2017-12-06 | End: 2017-12-07

## 2017-12-06 RX ADMIN — Medication 10 MILLIGRAM(S): at 12:36

## 2017-12-06 RX ADMIN — Medication 500 MILLIGRAM(S): at 12:13

## 2017-12-06 RX ADMIN — Medication 400 MILLIGRAM(S): at 13:53

## 2017-12-06 RX ADMIN — Medication 400 MILLIGRAM(S): at 01:54

## 2017-12-06 RX ADMIN — HEPARIN SODIUM 1200 UNIT(S)/HR: 5000 INJECTION INTRAVENOUS; SUBCUTANEOUS at 10:01

## 2017-12-06 RX ADMIN — Medication 1 TABLET(S): at 05:49

## 2017-12-06 RX ADMIN — CEFEPIME 100 MILLIGRAM(S): 1 INJECTION, POWDER, FOR SOLUTION INTRAMUSCULAR; INTRAVENOUS at 12:36

## 2017-12-06 RX ADMIN — Medication 20 MILLIEQUIVALENT(S): at 12:13

## 2017-12-06 RX ADMIN — ALBUTEROL 2.5 MILLIGRAM(S): 90 AEROSOL, METERED ORAL at 06:21

## 2017-12-06 RX ADMIN — Medication 1 CAPSULE(S): at 09:55

## 2017-12-06 RX ADMIN — Medication 1 CAPSULE(S): at 12:14

## 2017-12-06 RX ADMIN — MORPHINE SULFATE 5 MG/HR: 50 CAPSULE, EXTENDED RELEASE ORAL at 21:30

## 2017-12-06 RX ADMIN — Medication 3 MILLILITER(S): at 09:26

## 2017-12-06 RX ADMIN — Medication 20 MILLIGRAM(S): at 05:48

## 2017-12-06 RX ADMIN — Medication 1 MILLIGRAM(S): at 21:45

## 2017-12-06 RX ADMIN — Medication 10 MILLIGRAM(S): at 00:46

## 2017-12-06 RX ADMIN — Medication 10 MILLIGRAM(S): at 05:48

## 2017-12-06 RX ADMIN — MORPHINE SULFATE 5 MG/HR: 50 CAPSULE, EXTENDED RELEASE ORAL at 21:04

## 2017-12-06 RX ADMIN — Medication 3 MILLILITER(S): at 03:21

## 2017-12-06 NOTE — PROGRESS NOTE ADULT - ASSESSMENT
CRF: ARF (pre renal azotemia)  CHF with pleural effusions  Hypernatremia  Leukocytosis and fever ==> pseudomonal UTI  Delerium==> likely toxic-metabolic  - adjust diuretics  - hypotonic fluid via NGT or IV  - avoid sedatives and pain meds  - will adjust IV antibiotics==> add Fortaz

## 2017-12-06 NOTE — PROGRESS NOTE ADULT - CARDIOVASCULAR
detailed exam
Regular rate & rhythm, normal S1, S2; no murmurs, gallops or rubs; no S3, S4
detailed exam

## 2017-12-06 NOTE — PROGRESS NOTE ADULT - ASSESSMENT
s/p lap sigmoid colon resection for colon cancer. Had a stroke post operatively and no significant clinical improvement. Palliative care service offered and declined at this time. Electrolyte abnormalities noted today. Will resume tube feeds (checking residuals- high risk for aspiration) and repeat swallow eval to access if she will be able to take PO or if PEG should be placed by GI. Needs free water to correct hypernatremia. Heparin drip for recent stroke. Needs IV antibiotics for pseudomonas UTI which is pansensitive. Appreciate management of other services. No acute surgical issues currently. Discussed with daughter

## 2017-12-06 NOTE — PROGRESS NOTE ADULT - SUBJECTIVE AND OBJECTIVE BOX
INTERVAL HPI/OVERNIGHT EVENTS: Patient still somnolent. Receiving NG tube feeding.     MEDICATIONS  (STANDING):  acetaminophen  IVPB 1000 milliGRAM(s) IV Intermittent once  ALBUTerol/ipratropium for Nebulization 3 milliLiter(s) Nebulizer every 6 hours  amylase/lipase/protease  (CREON  6,000 Units) 1 Capsule(s) Oral three times a day with meals  ascorbic acid 500 milliGRAM(s) Oral daily  calcium carbonate 1250 mG + Vitamin D (OsCal 500 + D) 1 Tablet(s) Oral two times a day  cefepime  IVPB 1000 milliGRAM(s) IV Intermittent every 24 hours  dextrose 5%. 1000 milliLiter(s) (50 mL/Hr) IV Continuous <Continuous>  dextrose 50% Injectable 12.5 Gram(s) IV Push once  dextrose 50% Injectable 25 Gram(s) IV Push once  dextrose 50% Injectable 25 Gram(s) IV Push once  diltiazem    Tablet 30 milliGRAM(s) Enteral Tube every 6 hours  furosemide   Injectable 20 milliGRAM(s) IV Push two times a day  heparin  Infusion.  Unit(s)/Hr (7 mL/Hr) IV Continuous <Continuous>  insulin glargine Injectable (LANTUS) 20 Unit(s) SubCutaneous at bedtime  insulin lispro (HumaLOG) corrective regimen sliding scale   SubCutaneous every 6 hours  labetalol Injectable 10 milliGRAM(s) IV Push every 6 hours  lidocaine   Patch 1 Patch Transdermal daily  methimazole 2.5 milliGRAM(s) Oral <User Schedule>  mirtazapine 15 milliGRAM(s) Oral at bedtime  potassium chloride    Tablet ER 20 milliEquivalent(s) Oral daily    MEDICATIONS  (PRN):  ALBUTerol    0.083% 2.5 milliGRAM(s) Nebulizer every 3 hours PRN Bronchospasm  atropine 1% Ophthalmic Solution for SubLingual Use 2 Drop(s) SubLingual every 6 hours PRN secreations  dextrose Gel 1 Dose(s) Oral once PRN Blood Glucose LESS THAN 70 milliGRAM(s)/deciliter  glucagon  Injectable 1 milliGRAM(s) IntraMuscular once PRN Glucose LESS THAN 70 milligrams/deciliter  magnesium sulfate  IVPB 2 Gram(s) IV Intermittent once PRN magnesium <1.8  morphine  - Injectable 2 milliGRAM(s) IV Push every 6 hours PRN Tachypnea  ondansetron Injectable 4 milliGRAM(s) IV Push every 6 hours PRN Nausea      Allergies    Cipro (Unknown)  contrast media (iodine-based) (Unknown)  penicillins (Unknown)  shellfish (Unknown)  Valium (Unknown)    Intolerances        Vital Signs Last 24 Hrs  T(C): 37.7 (06 Dec 2017 12:00), Max: 38.3 (06 Dec 2017 00:31)  T(F): 99.8 (06 Dec 2017 12:00), Max: 101 (06 Dec 2017 10:12)  HR: 120 (06 Dec 2017 12:00) (89 - 123)  BP: 176/92 (06 Dec 2017 12:00) (143/74 - 176/92)  BP(mean): 117 (06 Dec 2017 12:00) (85 - 117)  RR: 24 (06 Dec 2017 12:00) (22 - 38)  SpO2: 100% (06 Dec 2017 12:00) (92% - 100%)    LABS:                        10.1   20.8  )-----------( 417      ( 06 Dec 2017 07:39 )             34.1     12-06    154<H>  |  105  |  57.0<H>  ----------------------------<  151<H>  4.4   |  34.0<H>  |  1.29    Ca    9.3      06 Dec 2017 07:39    TPro  7.2  /  Alb  3.1<L>  /  TBili  0.6  /  DBili  x   /  AST  77<H>  /  ALT  59<H>  /  AlkPhos  94  12-06    PTT - ( 06 Dec 2017 07:39 )  PTT:41.9 sec      RADIOLOGY & ADDITIONAL TESTS:

## 2017-12-06 NOTE — PROGRESS NOTE ADULT - SUBJECTIVE AND OBJECTIVE BOX
NEPHROLOGY INTERVAL HPI/OVERNIGHT EVENTS:  events of last night noted  pt now lethargic but no acute distress  I spoke with daughter at bedside    MEDICATIONS  (STANDING):  ALBUTerol/ipratropium for Nebulization 3 milliLiter(s) Nebulizer every 6 hours  amylase/lipase/protease  (CREON  6,000 Units) 1 Capsule(s) Oral three times a day with meals  ascorbic acid 500 milliGRAM(s) Oral daily  calcium carbonate 1250 mG + Vitamin D (OsCal 500 + D) 1 Tablet(s) Oral two times a day  cefTRIAXone   IVPB      cefTRIAXone   IVPB 1 Gram(s) IV Intermittent every 24 hours  dextrose 5%. 1000 milliLiter(s) (50 mL/Hr) IV Continuous <Continuous>  dextrose 50% Injectable 12.5 Gram(s) IV Push once  dextrose 50% Injectable 25 Gram(s) IV Push once  dextrose 50% Injectable 25 Gram(s) IV Push once  diltiazem    Tablet 30 milliGRAM(s) Enteral Tube every 6 hours  furosemide   Injectable 20 milliGRAM(s) IV Push every 12 hours  heparin  Infusion.  Unit(s)/Hr (7 mL/Hr) IV Continuous <Continuous>  insulin glargine Injectable (LANTUS) 20 Unit(s) SubCutaneous at bedtime  insulin lispro (HumaLOG) corrective regimen sliding scale   SubCutaneous every 6 hours  labetalol Injectable 10 milliGRAM(s) IV Push every 6 hours  lidocaine   Patch 1 Patch Transdermal daily  methimazole 2.5 milliGRAM(s) Oral <User Schedule>  mirtazapine 15 milliGRAM(s) Oral at bedtime  potassium chloride    Tablet ER 20 milliEquivalent(s) Oral daily    MEDICATIONS  (PRN):  ALBUTerol    0.083% 2.5 milliGRAM(s) Nebulizer every 3 hours PRN Bronchospasm  atropine 1% Ophthalmic Solution for SubLingual Use 2 Drop(s) SubLingual every 6 hours PRN secreations  dextrose Gel 1 Dose(s) Oral once PRN Blood Glucose LESS THAN 70 milliGRAM(s)/deciliter  glucagon  Injectable 1 milliGRAM(s) IntraMuscular once PRN Glucose LESS THAN 70 milligrams/deciliter  magnesium sulfate  IVPB 2 Gram(s) IV Intermittent once PRN magnesium <1.8  morphine  - Injectable 2 milliGRAM(s) IV Push every 6 hours PRN Tachypnea  ondansetron Injectable 4 milliGRAM(s) IV Push every 6 hours PRN Nausea      Allergies    Cipro (Unknown)  contrast media (iodine-based) (Unknown)  penicillins (Unknown)  shellfish (Unknown)  Valium (Unknown)    Intolerances          Vital Signs Last 24 Hrs  T(C): 37.7 (06 Dec 2017 08:37), Max: 38.3 (06 Dec 2017 00:31)  T(F): 99.8 (06 Dec 2017 08:37), Max: 100.9 (06 Dec 2017 00:31)  HR: 97 (06 Dec 2017 07:56) (89 - 123)  BP: 165/63 (06 Dec 2017 07:56) (143/74 - 175/75)  BP(mean): 93 (06 Dec 2017 07:56) (85 - 111)  RR: 26 (06 Dec 2017 07:56) (22 - 38)  SpO2: 93% (06 Dec 2017 07:56) (92% - 99%)    PHYSICAL EXAM:    GENERAL: lethargic; chronically ill  HEAD:  O2 mask in place  ENMT: NGT in place  NECK: Supple, No JVD  NERVOUS SYSTEM:  Lethargic but arousable; not interactive  CHEST/LUNG: + Diffuse chronic crackles  HEART: Irr; no rub  ABDOMEN: Soft, Nontender, Nondistended; Bowel sounds present  EXTREMITIES:  2+ Peripheral Pulses, No edema  SKIN: No rashes or lesions    LABS:                        10.1   20.8  )-----------( 417      ( 06 Dec 2017 07:39 )             34.1     12-06    154<H>  |  105  |  57.0<H>  ----------------------------<  151<H>  4.4   |  34.0<H>  |  1.29    Ca    9.3      06 Dec 2017 07:39    TPro  7.2  /  Alb  3.1<L>  /  TBili  0.6  /  DBili  x   /  AST  77<H>  /  ALT  59<H>  /  AlkPhos  94  12-06    PTT - ( 06 Dec 2017 07:39 )  PTT:41.9 sec    Urine Culture Results:   >100,000 CFU/ml Pseudomonas aeruginosa (12.04.17 @ 07:07)    Blood Culture Results:   No growth at 48 hours (12.03.17 @ 16:48)              RADIOLOGY & ADDITIONAL TESTS:  < from: Xray Chest 1 View AP/PA. (12.04.17 @ 06:06) >   EXAM:  CHEST SINGLE VIEW FRONTAL                          PROCEDURE DATE:  12/04/2017          INTERPRETATION:  CHEST AP PORTABLE:    History: chf, stroke.     Date and time of exam: 12/4/2017 6:01 AM.    Technique: A single AP view of the chest was obtained.    Comparison exam: 12/3/2017 12:29 PM.    Findings:  Cardiomegaly. Bilateral pleural effusions. Permanent vascular congestion.   A nasogastric tube is noted with the tip located low the diaphragm,   unchanged..    Impression:  Congestiveheart failure with bilateral pleural effusions. Stable exam   without significant change since the previous study..      < end of copied text >

## 2017-12-06 NOTE — CHART NOTE - NSCHARTNOTEFT_GEN_A_CORE
Code Sepsis called for Temp, HR, RR, BP    HPI:  88 yo female, c/o progressively enlarging abd girth over 1 yr, s/p colonoscopy 10/19/17, found to have tubular adenoma at ileocecal valve and 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 Pt denies Nausea/vomiting, diarrhea. C/o constipation. Denies fever/chills.    Pt direct admission for operative intervention by Dr Flwoers on .    Pt c/o SOB/dyspnea and mild vague chest discomfort upon admission to the floor, also c/o tender inner left lower leg, also c/o LE edema L > R and coldness of LE's    Pulmonary consult - Dr Bang,    Primary care consult - Dr Barbosa    Cardiology consult - Dr Bush (2017 15:13)    PAST MEDICAL & SURGICAL HISTORY:  Renal insufficiency  Malignant neoplasm of descending colon  Microcytic anemia  ILD (interstitial lung disease)  Tricuspid valve insufficiency, unspecified etiology  Mitral valve insufficiency, unspecified etiology  Aortic valve insufficiency, etiology of cardiac valve disease unspecified  IDDM (insulin dependent diabetes mellitus)  COPD, mild  Atrial fibrillation, unspecified type  Diabetes  Cardiomegaly  Congestive heart failure, unspecified congestive heart failure chronicity, unspecified congestive heart failure type  H/O: hysterectomy  History of laparoscopic cholecystectomy  H/O splenectomy      Allergies    Cipro (Unknown)  contrast media (iodine-based) (Unknown)  penicillins (Unknown)  shellfish (Unknown)  Valium (Unknown)    Intolerances      MEDICATIONS  (STANDING):  ALBUTerol/ipratropium for Nebulization 3 milliLiter(s) Nebulizer every 6 hours  amylase/lipase/protease  (CREON  6,000 Units) 1 Capsule(s) Oral three times a day with meals  ascorbic acid 500 milliGRAM(s) Oral daily  calcium carbonate 1250 mG + Vitamin D (OsCal 500 + D) 1 Tablet(s) Oral two times a day  cefepime  IVPB 1000 milliGRAM(s) IV Intermittent every 24 hours  dextrose 5%. 1000 milliLiter(s) (50 mL/Hr) IV Continuous <Continuous>  dextrose 50% Injectable 12.5 Gram(s) IV Push once  dextrose 50% Injectable 25 Gram(s) IV Push once  dextrose 50% Injectable 25 Gram(s) IV Push once  diltiazem    Tablet 30 milliGRAM(s) Enteral Tube every 6 hours  furosemide   Injectable 20 milliGRAM(s) IV Push two times a day  heparin  Infusion.  Unit(s)/Hr (7 mL/Hr) IV Continuous <Continuous>  insulin glargine Injectable (LANTUS) 20 Unit(s) SubCutaneous at bedtime  insulin lispro (HumaLOG) corrective regimen sliding scale   SubCutaneous every 6 hours  labetalol Injectable 10 milliGRAM(s) IV Push every 6 hours  lidocaine   Patch 1 Patch Transdermal daily  methimazole 2.5 milliGRAM(s) Oral <User Schedule>  mirtazapine 15 milliGRAM(s) Oral at bedtime  potassium chloride    Tablet ER 20 milliEquivalent(s) Oral daily  sodium chloride 0.9% Bolus 500 milliLiter(s) IV Bolus every 15 minutes    MEDICATIONS  (PRN):  ALBUTerol    0.083% 2.5 milliGRAM(s) Nebulizer every 3 hours PRN Bronchospasm  atropine 1% Ophthalmic Solution for SubLingual Use 2 Drop(s) SubLingual every 6 hours PRN secreations  dextrose Gel 1 Dose(s) Oral once PRN Blood Glucose LESS THAN 70 milliGRAM(s)/deciliter  glucagon  Injectable 1 milliGRAM(s) IntraMuscular once PRN Glucose LESS THAN 70 milligrams/deciliter  magnesium sulfate  IVPB 2 Gram(s) IV Intermittent once PRN magnesium <1.8  morphine  - Injectable 2 milliGRAM(s) IV Push every 6 hours PRN Tachypnea  ondansetron Injectable 4 milliGRAM(s) IV Push every 6 hours PRN Nausea      Vital Signs Last 24 Hrs  T(C): 39 (17 @ 14:18), Max: 39 (17 @ 14:18)  T(F): 102.2 (17 @ 14:18), Max: 102.2 (17 @ 14:18)  HR: 107 (17 @ 15:18) (89 - 123)  BP: 145/62 (17 @ 15:18) (143/74 - 176/92)  BP(mean): 88 (17 @ 15:18) (85 - 117)  RR: 41 (17 @ 15:18) (22 - 41)  SpO2: 97% (17 @ 15:18) (92% - 100%)  Daily     Daily Weight in k.5 (06 Dec 2017 05:02)  I&O's Summary    05 Dec 2017 07:01  -  06 Dec 2017 07:00  --------------------------------------------------------  IN: 1616 mL / OUT: 0 mL / NET: 1616 mL    06 Dec 2017 07:  -  06 Dec 2017 15:51  --------------------------------------------------------  IN: 96 mL / OUT: 0 mL / NET: 96 mL        PHYSICAL EXAM:    GENERAL: somnolent, non-verbal, mild respiratory distress      NECK: Supple, No JVD  NERVOUS SYSTEM: AOx0  CHEST/LUNG: No rales, rhonchi, wheezing, or rubs  HEART: Regular rate and rhythm; No murmurs, rubs, or gallops  ABDOMEN: Soft, Nontender, Nondistended; Bowel sounds present  EXTREMITIES:  2+ Peripheral Pulses, No clubbing, cyanosis, or edema  SKIN: warm, turgor good,  capillary refill    <2 sec    or    >2 sec      Assessment-  1. Sepsis- a new suspected infection + 2 of ( Temp >101/ HR 90 or more/ RR >20/ WBC >12 K or < 4K/ Bands > 5% )  2. Severe sepsis/ Septic shock- Sepsis + (AMS/ SBP< 90/ MAP <65/ SBP reduced > 40 mmHg from baseline/ Plts <100K/ Cr > 2/ Cr > 50% from baseline/>50% FiO2 required for SaO2 >90%/ U Output < 30 ml/hr or < 240 ml in 8 hrs/ Bilirubin >2/ Lactate >2/ INR > 1.5/   PTT> 60 secs )    -----------------------------------------------------------------------------------------------------------------------------------------------------------------------------------  Assessment-  1. Sepsis                                                                                                                                                                 Intervention-  STAT Labs- CBC, CMP, S Lactate, Blood C/S x 2, PT/ PTT/ INR, UA, Urine C/S, CXR                            Antibiotic - patient already on cefepime for complicated UTI                                                                                                                                           (Monotherapy- Aminoglycosides/ Cipro/ Aztreonam/ Cephalosporin/ Clinda/ Dapto/ Vanco/ Linezolid/ Macrolide/ PCN)      Intervention - Fluids                                                                                                                            Crystalloid fluid( NS)  Rate (30ml/Kg in 500ml boluses Q 15 mins until goal)  Total Volume- 2 Liters    Case discussed w/ Dr. 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 Pt denies Nausea/vomiting, diarrhea. C/o constipation. Denies fever/chills.    Pt direct admission for operative intervention by Dr Flowers on .    Pt c/o SOB/dyspnea and mild vague chest discomfort upon admission to the floor, also c/o tender inner left lower leg, also c/o LE edema L > R and coldness of LE's    Pulmonary consult - Dr Bang,    Primary care consult - Dr Barbosa    Cardiology consult - Dr Bush (2017 15:13)    PAST MEDICAL & SURGICAL HISTORY:  Renal insufficiency  Malignant neoplasm of descending colon  Microcytic anemia  ILD (interstitial lung disease)  Tricuspid valve insufficiency, unspecified etiology  Mitral valve insufficiency, unspecified etiology  Aortic valve insufficiency, etiology of cardiac valve disease unspecified  IDDM (insulin dependent diabetes mellitus)  COPD, mild  Atrial fibrillation, unspecified type  Diabetes  Cardiomegaly  Congestive heart failure, unspecified congestive heart failure chronicity, unspecified congestive heart failure type  H/O: hysterectomy  History of laparoscopic cholecystectomy  H/O splenectomy      Allergies    Cipro (Unknown)  contrast media (iodine-based) (Unknown)  penicillins (Unknown)  shellfish (Unknown)  Valium (Unknown)    Intolerances      MEDICATIONS  (STANDING):  ALBUTerol/ipratropium for Nebulization 3 milliLiter(s) Nebulizer every 6 hours  amylase/lipase/protease  (CREON  6,000 Units) 1 Capsule(s) Oral three times a day with meals  ascorbic acid 500 milliGRAM(s) Oral daily  calcium carbonate 1250 mG + Vitamin D (OsCal 500 + D) 1 Tablet(s) Oral two times a day  cefepime  IVPB 1000 milliGRAM(s) IV Intermittent every 24 hours  dextrose 5%. 1000 milliLiter(s) (50 mL/Hr) IV Continuous <Continuous>  dextrose 50% Injectable 12.5 Gram(s) IV Push once  dextrose 50% Injectable 25 Gram(s) IV Push once  dextrose 50% Injectable 25 Gram(s) IV Push once  diltiazem    Tablet 30 milliGRAM(s) Enteral Tube every 6 hours  furosemide   Injectable 20 milliGRAM(s) IV Push two times a day  heparin  Infusion.  Unit(s)/Hr (7 mL/Hr) IV Continuous <Continuous>  insulin glargine Injectable (LANTUS) 20 Unit(s) SubCutaneous at bedtime  insulin lispro (HumaLOG) corrective regimen sliding scale   SubCutaneous every 6 hours  labetalol Injectable 10 milliGRAM(s) IV Push every 6 hours  lidocaine   Patch 1 Patch Transdermal daily  methimazole 2.5 milliGRAM(s) Oral <User Schedule>  mirtazapine 15 milliGRAM(s) Oral at bedtime  potassium chloride    Tablet ER 20 milliEquivalent(s) Oral daily  sodium chloride 0.9% Bolus 500 milliLiter(s) IV Bolus every 15 minutes    MEDICATIONS  (PRN):  ALBUTerol    0.083% 2.5 milliGRAM(s) Nebulizer every 3 hours PRN Bronchospasm  atropine 1% Ophthalmic Solution for SubLingual Use 2 Drop(s) SubLingual every 6 hours PRN secreations  dextrose Gel 1 Dose(s) Oral once PRN Blood Glucose LESS THAN 70 milliGRAM(s)/deciliter  glucagon  Injectable 1 milliGRAM(s) IntraMuscular once PRN Glucose LESS THAN 70 milligrams/deciliter  magnesium sulfate  IVPB 2 Gram(s) IV Intermittent once PRN magnesium <1.8  morphine  - Injectable 2 milliGRAM(s) IV Push every 6 hours PRN Tachypnea  ondansetron Injectable 4 milliGRAM(s) IV Push every 6 hours PRN Nausea      Vital Signs Last 24 Hrs  T(F): 101.6 rectal  HR: 110  BP: 145/62 (-17 @ 15:18) (143/74 - 176/92)  BP(mean): 145/62  RR: 44  SpO2: 97% NRB    Daily Weight in k.5 (06 Dec 2017 05:02)  I&O's Summary    05 Dec 2017 07:  -  06 Dec 2017 07:00  --------------------------------------------------------  IN: 1616 mL / OUT: 0 mL / NET: 1616 mL    06 Dec 2017 07:  -  06 Dec 2017 15:51  --------------------------------------------------------  IN: 96 mL / OUT: 0 mL / NET: 96 mL        PHYSICAL EXAM:    GENERAL: somnolent, non-verbal, mild respiratory distress      NECK: Supple, No JVD  NERVOUS SYSTEM: AOx0  CHEST/LUNG: No rales, rhonchi, wheezing, or rubs  HEART: Regular rate and rhythm; No murmurs, rubs, or gallops  ABDOMEN: Soft, Nontender, Nondistended; Bowel sounds present  EXTREMITIES:  2+ Peripheral Pulses, No clubbing, cyanosis, or edema  SKIN: warm, turgor good,  capillary refill    <2 sec    or    >2 sec      Assessment-  1. Sepsis- a new suspected infection + 2 of ( Temp >101/ HR 90 or more/ RR >20/ WBC >12 K or < 4K/ Bands > 5% )  2. Severe sepsis/ Septic shock- Sepsis + (AMS/ SBP< 90/ MAP <65/ SBP reduced > 40 mmHg from baseline/ Plts <100K/ Cr > 2/ Cr > 50% from baseline/>50% FiO2 required for SaO2 >90%/ U Output < 30 ml/hr or < 240 ml in 8 hrs/ Bilirubin >2/ Lactate >2/ INR > 1.5/   PTT> 60 secs )    -----------------------------------------------------------------------------------------------------------------------------------------------------------------------------------  Assessment-  1. Sepsis                                                                                                                                                                 Intervention-  STAT Labs- CBC, CMP, S Lactate, Blood C/S x 2, PT/ PTT/ INR, UA, Urine C/S, CXR                            Antibiotic - patient already on cefepime for complicated UTI                                                                                                                                           (Monotherapy- Aminoglycosides/ Cipro/ Aztreonam/ Cephalosporin/ Clinda/ Dapto/ Vanco/ Linezolid/ Macrolide/ PCN)      Intervention - Fluids                                                                                                                            Crystalloid fluid( NS)  Rate (30ml/Kg in 500ml boluses Q 15 mins until goal)  Total Volume- 2 Liters    Case discussed w/ Dr. 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 Rapid response was called because patient was found to have tachypnea (35-40s), fever (101.6 F rectal) & tachycardia (110).     PAST MEDICAL & SURGICAL HISTORY:  Renal insufficiency  Malignant neoplasm of descending colon  Microcytic anemia  ILD (interstitial lung disease)  Tricuspid valve insufficiency, unspecified etiology  Mitral valve insufficiency, unspecified etiology  Aortic valve insufficiency, etiology of cardiac valve disease unspecified  IDDM (insulin dependent diabetes mellitus)  COPD, mild  Atrial fibrillation, unspecified type  Diabetes  Cardiomegaly  Congestive heart failure, unspecified congestive heart failure chronicity, unspecified congestive heart failure type  H/O: hysterectomy  History of laparoscopic cholecystectomy  H/O splenectomy      Allergies    Cipro (Unknown)  contrast media (iodine-based) (Unknown)  penicillins (Unknown)  shellfish (Unknown)  Valium (Unknown)    Intolerances      MEDICATIONS  (STANDING):  ALBUTerol/ipratropium for Nebulization 3 milliLiter(s) Nebulizer every 6 hours  amylase/lipase/protease  (CREON  6,000 Units) 1 Capsule(s) Oral three times a day with meals  ascorbic acid 500 milliGRAM(s) Oral daily  calcium carbonate 1250 mG + Vitamin D (OsCal 500 + D) 1 Tablet(s) Oral two times a day  cefepime  IVPB 1000 milliGRAM(s) IV Intermittent every 24 hours  dextrose 5%. 1000 milliLiter(s) (50 mL/Hr) IV Continuous <Continuous>  dextrose 50% Injectable 12.5 Gram(s) IV Push once  dextrose 50% Injectable 25 Gram(s) IV Push once  dextrose 50% Injectable 25 Gram(s) IV Push once  diltiazem    Tablet 30 milliGRAM(s) Enteral Tube every 6 hours  furosemide   Injectable 20 milliGRAM(s) IV Push two times a day  heparin  Infusion.  Unit(s)/Hr (7 mL/Hr) IV Continuous <Continuous>  insulin glargine Injectable (LANTUS) 20 Unit(s) SubCutaneous at bedtime  insulin lispro (HumaLOG) corrective regimen sliding scale   SubCutaneous every 6 hours  labetalol Injectable 10 milliGRAM(s) IV Push every 6 hours  lidocaine   Patch 1 Patch Transdermal daily  methimazole 2.5 milliGRAM(s) Oral <User Schedule>  mirtazapine 15 milliGRAM(s) Oral at bedtime  potassium chloride    Tablet ER 20 milliEquivalent(s) Oral daily  sodium chloride 0.9% Bolus 500 milliLiter(s) IV Bolus every 15 minutes    MEDICATIONS  (PRN):  ALBUTerol    0.083% 2.5 milliGRAM(s) Nebulizer every 3 hours PRN Bronchospasm  atropine 1% Ophthalmic Solution for SubLingual Use 2 Drop(s) SubLingual every 6 hours PRN secreations  dextrose Gel 1 Dose(s) Oral once PRN Blood Glucose LESS THAN 70 milliGRAM(s)/deciliter  glucagon  Injectable 1 milliGRAM(s) IntraMuscular once PRN Glucose LESS THAN 70 milligrams/deciliter  magnesium sulfate  IVPB 2 Gram(s) IV Intermittent once PRN magnesium <1.8  morphine  - Injectable 2 milliGRAM(s) IV Push every 6 hours PRN Tachypnea  ondansetron Injectable 4 milliGRAM(s) IV Push every 6 hours PRN Nausea      Vital Signs Last 24 Hrs  T(F):   HR: 110  BP: 145/62 (17 @ 15:18) (143/74 - 176/92)  BP(mean): 145/62  RR: 44  SpO2: 97% NRB    Daily Weight in k.5 (06 Dec 2017 05:02)  I&O's Summary    05 Dec 2017 07:  -  06 Dec 2017 07:00  --------------------------------------------------------  IN: 1616 mL / OUT: 0 mL / NET: 1616 mL    06 Dec 2017 07:  -  06 Dec 2017 15:51  --------------------------------------------------------  IN: 96 mL / OUT: 0 mL / NET: 96 mL        PHYSICAL EXAM:    GENERAL: somnolent, non-verbal, mild respiratory distress      NECK: Supple, No JVD  NERVOUS SYSTEM: AOx0  CHEST/LUNG: No rales, rhonchi, wheezing, or rubs  HEART: Regular rate and rhythm; No murmurs, rubs, or gallops  ABDOMEN: Soft, Nontender, Nondistended; Bowel sounds present  EXTREMITIES:  2+ Peripheral Pulses, No clubbing, cyanosis, or edema  SKIN: warm, turgor good,  capillary refill <2 sec          Assessment-  1. Sepsis- a new suspected infection + 2 of ( Temp >101/ HR 90 or more/ RR >20/ WBC >12 K or < 4K/ Bands > 5% )  2. Severe sepsis/ Septic shock- Sepsis + (AMS/ SBP< 90/ MAP <65/ SBP reduced > 40 mmHg from baseline/ Plts <100K/ Cr > 2/ Cr > 50% from baseline/>50% FiO2 required for SaO2 >90%/ U Output < 30 ml/hr or < 240 ml in 8 hrs/ Bilirubin >2/ Lactate >2/ INR > 1.5/   PTT> 60 secs )    -----------------------------------------------------------------------------------------------------------------------------------------------------------------------------------  Assessment-  1. Sepsis                                                                                                                                                                 Intervention-  STAT Labs- CBC, CMP, S Lactate, Blood C/S x 2, PT/ PTT/ INR, UA, Urine C/S, CXR                            Antibiotic - patient already on cefepime for complicated UTI (urine culture sensitive to cefepime)                                                                                                                                          (Monotherapy- Aminoglycosides/ Cipro/ Aztreonam/ Cephalosporin/ Clinda/ Dapto/ Vanco/ Linezolid/ Macrolide/ PCN)      Intervention - Fluids                                                                                                                            Crystalloid fluid( NS)  Rate (30ml/Kg in 500ml boluses Q 15 mins until goal)  Total Volume- 2 Liters    Case discussed w/ Dr. 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 Rapid response was called because patient was found to have tachypnea (35-40s), fever (101.6 F rectal) & tachycardia (110).     PAST MEDICAL & SURGICAL HISTORY:  Renal insufficiency  Malignant neoplasm of descending colon  Microcytic anemia  ILD (interstitial lung disease)  Tricuspid valve insufficiency, unspecified etiology  Mitral valve insufficiency, unspecified etiology  Aortic valve insufficiency, etiology of cardiac valve disease unspecified  IDDM (insulin dependent diabetes mellitus)  COPD, mild  Atrial fibrillation, unspecified type  Diabetes  Cardiomegaly  Congestive heart failure, unspecified congestive heart failure chronicity, unspecified congestive heart failure type  H/O: hysterectomy  History of laparoscopic cholecystectomy  H/O splenectomy      Allergies    Cipro (Unknown)  contrast media (iodine-based) (Unknown)  penicillins (Unknown)  shellfish (Unknown)  Valium (Unknown)    Intolerances      MEDICATIONS  (STANDING):  ALBUTerol/ipratropium for Nebulization 3 milliLiter(s) Nebulizer every 6 hours  amylase/lipase/protease  (CREON  6,000 Units) 1 Capsule(s) Oral three times a day with meals  ascorbic acid 500 milliGRAM(s) Oral daily  calcium carbonate 1250 mG + Vitamin D (OsCal 500 + D) 1 Tablet(s) Oral two times a day  cefepime  IVPB 1000 milliGRAM(s) IV Intermittent every 24 hours  dextrose 5%. 1000 milliLiter(s) (50 mL/Hr) IV Continuous <Continuous>  dextrose 50% Injectable 12.5 Gram(s) IV Push once  dextrose 50% Injectable 25 Gram(s) IV Push once  dextrose 50% Injectable 25 Gram(s) IV Push once  diltiazem    Tablet 30 milliGRAM(s) Enteral Tube every 6 hours  furosemide   Injectable 20 milliGRAM(s) IV Push two times a day  heparin  Infusion.  Unit(s)/Hr (7 mL/Hr) IV Continuous <Continuous>  insulin glargine Injectable (LANTUS) 20 Unit(s) SubCutaneous at bedtime  insulin lispro (HumaLOG) corrective regimen sliding scale   SubCutaneous every 6 hours  labetalol Injectable 10 milliGRAM(s) IV Push every 6 hours  lidocaine   Patch 1 Patch Transdermal daily  methimazole 2.5 milliGRAM(s) Oral <User Schedule>  mirtazapine 15 milliGRAM(s) Oral at bedtime  potassium chloride    Tablet ER 20 milliEquivalent(s) Oral daily  sodium chloride 0.9% Bolus 500 milliLiter(s) IV Bolus every 15 minutes    MEDICATIONS  (PRN):  ALBUTerol    0.083% 2.5 milliGRAM(s) Nebulizer every 3 hours PRN Bronchospasm  atropine 1% Ophthalmic Solution for SubLingual Use 2 Drop(s) SubLingual every 6 hours PRN secreations  dextrose Gel 1 Dose(s) Oral once PRN Blood Glucose LESS THAN 70 milliGRAM(s)/deciliter  glucagon  Injectable 1 milliGRAM(s) IntraMuscular once PRN Glucose LESS THAN 70 milligrams/deciliter  magnesium sulfate  IVPB 2 Gram(s) IV Intermittent once PRN magnesium <1.8  morphine  - Injectable 2 milliGRAM(s) IV Push every 6 hours PRN Tachypnea  ondansetron Injectable 4 milliGRAM(s) IV Push every 6 hours PRN Nausea      Vital Signs Last 24 Hrs  T(F):   HR: 110  BP: 145/62 (17 @ 15:18) (143/74 - 176/92)  BP(mean): 145/62  RR: 44  SpO2: 97% NRB    Daily Weight in k.5 (06 Dec 2017 05:02)  I&O's Summary    05 Dec 2017 07:  -  06 Dec 2017 07:00  --------------------------------------------------------  IN: 1616 mL / OUT: 0 mL / NET: 1616 mL    06 Dec 2017 07:  -  06 Dec 2017 15:51  --------------------------------------------------------  IN: 96 mL / OUT: 0 mL / NET: 96 mL        PHYSICAL EXAM:    GENERAL: somnolent, non-verbal, mild respiratory distress      NECK: Supple, No JVD  NERVOUS SYSTEM: AOx0  CHEST/LUNG: No rales, rhonchi, wheezing, or rubs  HEART: Regular rate and rhythm; No murmurs, rubs, or gallops  ABDOMEN: Soft, Nontender, Nondistended; Bowel sounds present  EXTREMITIES:  2+ Peripheral Pulses, No clubbing, cyanosis, or edema  SKIN: warm, turgor good,  capillary refill <2 sec          Assessment-  1. Sepsis- a new suspected infection + 2 of ( Temp >101/ HR 90 or more/ RR >20/ WBC >12 K or < 4K/ Bands > 5% )  2. Severe sepsis/ Septic shock- Sepsis + (AMS/ SBP< 90/ MAP <65/ SBP reduced > 40 mmHg from baseline/ Plts <100K/ Cr > 2/ Cr > 50% from baseline/>50% FiO2 required for SaO2 >90%/ U Output < 30 ml/hr or < 240 ml in 8 hrs/ Bilirubin >2/ Lactate >2/ INR > 1.5/   PTT> 60 secs )    -----------------------------------------------------------------------------------------------------------------------------------------------------------------------------------  Assessment-  1. Sepsis                                                                                                                                                                 Intervention-  STAT Labs- CBC, CMP, S Lactate, Blood C/S x 2, PT/ PTT/ INR, UA, Urine C/S, CXR                            Antibiotic - patient already on cefepime for complicated UTI (urine culture sensitive to cefepime)                                                                                                                                          (Monotherapy- Aminoglycosides/ Cipro/ Aztreonam/ Cephalosporin/ Clinda/ Dapto/ Vanco/ Linezolid/ Macrolide/ PCN)      Intervention - Fluids                                                                                                                            Crystalloid fluid( NS)  Rate (30ml/Kg in 500ml boluses Q 15 mins until goal)  Total Volume- 2 Liters    Lactate 2.2    MICU and SICU consult pending    Case discussed w/ Dr. Edmond    Addendum-  Given patient's multiple comorbidities, recent strokes and surgery with respiratory distress patient's family members were given the choice of intubation  A family meeting was held with myself and critical care PA Karinne. Family requested more time to make decision concerning life support.  Family was informed of the critical nature of the patient's health    Brown PGY 3

## 2017-12-06 NOTE — PROGRESS NOTE ADULT - SUBJECTIVE AND OBJECTIVE BOX
No acute events overnight. Swallow eval not completed given somnolence. Per daughter, patient has brief periods when she recognizes here. Patient unable to provide any information this morning.    Exam:  Vital Signs Last 24 Hrs  T(C): 38.3 (06 Dec 2017 10:12), Max: 38.3 (06 Dec 2017 00:31)  T(F): 101 (06 Dec 2017 10:12), Max: 101 (06 Dec 2017 10:12)  HR: 97 (06 Dec 2017 07:56) (89 - 123)  BP: 165/63 (06 Dec 2017 07:56) (143/74 - 175/75)  BP(mean): 93 (06 Dec 2017 07:56) (85 - 111)  RR: 26 (06 Dec 2017 07:56) (22 - 38)  SpO2: 93% (06 Dec 2017 07:56) (92% - 99%)    In bed, somnolent but eye partially open  Not tracking with eyes or following commands  Abdomen soft, non tender, non distended. Incision clean and intact with staples                          10.1   20.8  )-----------( 417      ( 06 Dec 2017 07:39 )             34.1   12-06    154<H>  |  105  |  57.0<H>  ----------------------------<  151<H>  4.4   |  34.0<H>  |  1.29    Ca    9.3      06 Dec 2017 07:39    TPro  7.2  /  Alb  3.1<L>  /  TBili  0.6  /  DBili  x   /  AST  77<H>  /  ALT  59<H>  /  AlkPhos  94  12-06

## 2017-12-06 NOTE — PROGRESS NOTE ADULT - GASTROINTESTINAL
detailed exam
detailed exam
Soft, non-tender, no hepatosplenomegaly, normal bowel sounds
detailed exam

## 2017-12-06 NOTE — CONSULT NOTE ADULT - SUBJECTIVE AND OBJECTIVE BOX
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  She has failed her swallowing eval over the last several days due to lethargy and has a feeding tube.  She has developed fevers and is on Cefepime currently for UTI.  Tonight RRT was called due to tachypnea, hypoxia, fever, tachycardia.  Pt has developed acute renal insufficiency and elevated LFTs today.  She is hypoxic on NRB 02.  Upon my arrival pt is in moderate to severe respiratory distress on NRB.  Pt was already seen and was refused by SICU team.  Family is at bedside.  After long discussion with family about pt's condition they are deciding how they would like to proceed.  REASON FOR CONSULT: resp distress    CONSULT REQUESTED BY: [facility]/RRT team    Patient is a 89y old  Female who presents with a chief complaint of colon mass (16 Nov 2017 15:13)      BRIEF HOSPITAL COURSE: as above  Events last 24 hours: as above    PAST MEDICAL & SURGICAL HISTORY:  Renal insufficiency  Malignant neoplasm of descending colon  Microcytic anemia  ILD (interstitial lung disease)  Tricuspid valve insufficiency, unspecified etiology  Mitral valve insufficiency, unspecified etiology  Aortic valve insufficiency, etiology of cardiac valve disease unspecified  IDDM (insulin dependent diabetes mellitus)  COPD, mild  Atrial fibrillation, unspecified type  Diabetes  Cardiomegaly  Congestive heart failure, unspecified congestive heart failure chronicity, unspecified congestive heart failure type  H/O: hysterectomy  History of laparoscopic cholecystectomy  H/O splenectomy    Allergies    Cipro (Unknown)  contrast media (iodine-based) (Unknown)  penicillins (Unknown)  shellfish (Unknown)  Valium (Unknown)    Intolerances      FAMILY HISTORY:  No pertinent family history in first degree relatives      Review of Systems:  Pt unable to answer questions in ROS      Medications:  cefepime  IVPB 1000 milliGRAM(s) IV Intermittent every 24 hours    diltiazem    Tablet 30 milliGRAM(s) Enteral Tube every 6 hours  furosemide   Injectable 20 milliGRAM(s) IV Push two times a day  labetalol Injectable 10 milliGRAM(s) IV Push every 6 hours    ALBUTerol    0.083% 2.5 milliGRAM(s) Nebulizer every 3 hours PRN  ALBUTerol/ipratropium for Nebulization 3 milliLiter(s) Nebulizer every 6 hours    mirtazapine 15 milliGRAM(s) Oral at bedtime  morphine  - Injectable 2 milliGRAM(s) IV Push every 6 hours PRN  ondansetron Injectable 4 milliGRAM(s) IV Push every 6 hours PRN      heparin  Infusion.  Unit(s)/Hr IV Continuous <Continuous>    amylase/lipase/protease  (CREON  6,000 Units) 1 Capsule(s) Oral three times a day with meals      dextrose 50% Injectable 12.5 Gram(s) IV Push once  dextrose 50% Injectable 25 Gram(s) IV Push once  dextrose 50% Injectable 25 Gram(s) IV Push once  dextrose Gel 1 Dose(s) Oral once PRN  glucagon  Injectable 1 milliGRAM(s) IntraMuscular once PRN  insulin glargine Injectable (LANTUS) 20 Unit(s) SubCutaneous at bedtime  insulin lispro (HumaLOG) corrective regimen sliding scale   SubCutaneous every 6 hours  methimazole 2.5 milliGRAM(s) Oral <User Schedule>    ascorbic acid 500 milliGRAM(s) Oral daily  calcium carbonate 1250 mG + Vitamin D (OsCal 500 + D) 1 Tablet(s) Oral two times a day  dextrose 5%. 1000 milliLiter(s) IV Continuous <Continuous>  magnesium sulfate  IVPB 2 Gram(s) IV Intermittent once PRN  potassium chloride    Tablet ER 20 milliEquivalent(s) Oral daily  sodium chloride 0.9% Bolus 500 milliLiter(s) IV Bolus every 15 minutes      atropine 1% Ophthalmic Solution for SubLingual Use 2 Drop(s) SubLingual every 6 hours PRN  lidocaine   Patch 1 Patch Transdermal daily            ICU Vital Signs Last 24 Hrs  T(C): 38.7 (06 Dec 2017 15:18), Max: 39 (06 Dec 2017 14:18)  T(F): 101.6 (06 Dec 2017 15:18), Max: 102.2 (06 Dec 2017 14:18)  HR: 107 (06 Dec 2017 16:41) (89 - 123)  BP: 172/78 (06 Dec 2017 16:41) (143/74 - 176/92)  BP(mean): 105 (06 Dec 2017 16:41) (85 - 117)  ABP: --  ABP(mean): --  RR: 45 (06 Dec 2017 16:41) (22 - 45)  SpO2: 97% (06 Dec 2017 16:41) (92% - 100%)    Vital Signs Last 24 Hrs  T(C): 38.7 (06 Dec 2017 15:18), Max: 39 (06 Dec 2017 14:18)  T(F): 101.6 (06 Dec 2017 15:18), Max: 102.2 (06 Dec 2017 14:18)  HR: 107 (06 Dec 2017 16:41) (89 - 123)  BP: 172/78 (06 Dec 2017 16:41) (143/74 - 176/92)  BP(mean): 105 (06 Dec 2017 16:41) (85 - 117)  RR: 45 (06 Dec 2017 16:41) (22 - 45)  SpO2: 97% (06 Dec 2017 16:41) (92% - 100%)    ABG - ( 06 Dec 2017 16:09 )  pH: 7.50  /  pCO2: 46    /  pO2: 84    / HCO3: 35    / Base Excess: 11.4  /  SaO2: 97                  I&O's Detail    05 Dec 2017 07:01  -  06 Dec 2017 07:00  --------------------------------------------------------  IN:    Enteral Tube Flush: 120 mL    Enteral Tube Flush: 250 mL    Enteral Tube Flush: 200 mL    Glucerna: 720 mL    heparin  Infusion.: 276 mL    Solution: 50 mL  Total IN: 1616 mL    OUT:  Total OUT: 0 mL    Total NET: 1616 mL      06 Dec 2017 07:01  -  06 Dec 2017 18:12  --------------------------------------------------------  IN:    heparin  Infusion.: 96 mL  Total IN: 96 mL    OUT:  Total OUT: 0 mL    Total NET: 96 mL            LABS:                        10.6   18.5  )-----------( 417      ( 06 Dec 2017 16:02 )             35.5     12-06    155<H>  |  104  |  67.0<H>  ----------------------------<  131<H>  5.3   |  32.0<H>  |  1.66<H>    Ca    9.5      06 Dec 2017 16:02    TPro  7.4  /  Alb  3.4  /  TBili  0.8  /  DBili  x   /  AST  1041<H>  /  ALT  427<H>  /  AlkPhos  104  12-06      CARDIAC MARKERS ( 06 Dec 2017 16:02 )  x     / 0.16 ng/mL / x     / x     / x          CAPILLARY BLOOD GLUCOSE  236 (05 Dec 2017 21:56)      POCT Blood Glucose.: 126 mg/dL (06 Dec 2017 15:20)    PTT - ( 06 Dec 2017 07:39 )  PTT:41.9 sec    CULTURES:  Culture Results:   >100,000 CFU/ml Pseudomonas aeruginosa (12-04 @ 07:07)  Culture Results:   No growth at 48 hours (12-03 @ 16:48)      Physical Examination:    General: Acute respiratory distress on NRB, not candidate for NIPPV due to poor mental state aspiration.    NEURO:  not moving, not following commands, opens eyes to noxious stimulation    HEENT: Pupils equal, reactive to light.  Symmetric.    PULM: Course with diffuse rhonci b/l, occ expiratory wheeze, poor air exchange, tachypneic,     CVS: Tachycardic, no murmurs, rubs, or gallops    ABD: Distended, tender, hypoactive BS, staples in place, wounds c/d/i, no drainage    EXT: mild edema upper more than LE, withdraws from noxious    SKIN: cool and pale, no rashes noted.    RADIOLOGY: images reviewed    CRITICAL CARE TIME SPENT: 75 min

## 2017-12-06 NOTE — CONSULT NOTE ADULT - SUBJECTIVE AND OBJECTIVE BOX
INTERVAL HPI/OVERNIGHT EVENTS/SUBJECTIVE:  Pt RRT for respiratory distress.  Pt post op 11/26 lap assisted sigmoid resection, subsequently had CVA while off anticoagulation.  Pt has had hospital course complicated by respiratory difficulty.      ICU Vital Signs Last 24 Hrs  T(C): 38.7 (06 Dec 2017 15:18), Max: 39 (06 Dec 2017 14:18)  T(F): 101.6 (06 Dec 2017 15:18), Max: 102.2 (06 Dec 2017 14:18)  HR: 107 (06 Dec 2017 15:18) (89 - 123)  BP: 145/62 (06 Dec 2017 15:18) (143/74 - 176/92)  BP(mean): 88 (06 Dec 2017 15:18) (85 - 117)  ABP: --  ABP(mean): --  RR: 41 (06 Dec 2017 15:18) (22 - 41)  SpO2: 97% (06 Dec 2017 15:18) (92% - 100%)      I&O's Detail    05 Dec 2017 07:01  -  06 Dec 2017 07:00  --------------------------------------------------------  IN:    Enteral Tube Flush: 120 mL    Enteral Tube Flush: 250 mL    Enteral Tube Flush: 200 mL    Glucerna: 720 mL    heparin  Infusion.: 276 mL    Solution: 50 mL  Total IN: 1616 mL    OUT:  Total OUT: 0 mL    Total NET: 1616 mL      06 Dec 2017 07:01  -  06 Dec 2017 16:42  --------------------------------------------------------  IN:    heparin  Infusion.: 96 mL  Total IN: 96 mL    OUT:  Total OUT: 0 mL    Total NET: 96 mL            ABG - ( 06 Dec 2017 16:09 )  pH: 7.50  /  pCO2: 46    /  pO2: 84    / HCO3: 35    / Base Excess: 11.4  /  SaO2: 97                  MEDICATIONS  (STANDING):  ALBUTerol/ipratropium for Nebulization 3 milliLiter(s) Nebulizer every 6 hours  amylase/lipase/protease  (CREON  6,000 Units) 1 Capsule(s) Oral three times a day with meals  ascorbic acid 500 milliGRAM(s) Oral daily  calcium carbonate 1250 mG + Vitamin D (OsCal 500 + D) 1 Tablet(s) Oral two times a day  cefepime  IVPB 1000 milliGRAM(s) IV Intermittent every 24 hours  dextrose 5%. 1000 milliLiter(s) (50 mL/Hr) IV Continuous <Continuous>  dextrose 50% Injectable 12.5 Gram(s) IV Push once  dextrose 50% Injectable 25 Gram(s) IV Push once  dextrose 50% Injectable 25 Gram(s) IV Push once  diltiazem    Tablet 30 milliGRAM(s) Enteral Tube every 6 hours  furosemide   Injectable 20 milliGRAM(s) IV Push two times a day  heparin  Infusion.  Unit(s)/Hr (7 mL/Hr) IV Continuous <Continuous>  insulin glargine Injectable (LANTUS) 20 Unit(s) SubCutaneous at bedtime  insulin lispro (HumaLOG) corrective regimen sliding scale   SubCutaneous every 6 hours  labetalol Injectable 10 milliGRAM(s) IV Push every 6 hours  lidocaine   Patch 1 Patch Transdermal daily  methimazole 2.5 milliGRAM(s) Oral <User Schedule>  mirtazapine 15 milliGRAM(s) Oral at bedtime  potassium chloride    Tablet ER 20 milliEquivalent(s) Oral daily  sodium chloride 0.9% Bolus 500 milliLiter(s) IV Bolus every 15 minutes    MEDICATIONS  (PRN):  ALBUTerol    0.083% 2.5 milliGRAM(s) Nebulizer every 3 hours PRN Bronchospasm  atropine 1% Ophthalmic Solution for SubLingual Use 2 Drop(s) SubLingual every 6 hours PRN secreations  dextrose Gel 1 Dose(s) Oral once PRN Blood Glucose LESS THAN 70 milliGRAM(s)/deciliter  glucagon  Injectable 1 milliGRAM(s) IntraMuscular once PRN Glucose LESS THAN 70 milligrams/deciliter  magnesium sulfate  IVPB 2 Gram(s) IV Intermittent once PRN magnesium <1.8  morphine  - Injectable 2 milliGRAM(s) IV Push every 6 hours PRN Tachypnea  ondansetron Injectable 4 milliGRAM(s) IV Push every 6 hours PRN Nausea      NUTRITION/IVF: NPO at time of exam.  IVL    MOORE:   No      NG/OG TUBE:  NGT      PHYSICAL EXAM:    Gen:  Pt with obvious accessory muscle use    Eyes:  EOMI's, pupils 5mm BL sluggishly reactive    Neurological: Pt aphasic, able to open eyes to command    Neck:  No JVD noted    Pulmonary:  shallow breaths, coarse BS BL, crackles LLL    Cardiovascular:  Sinus tach    Gastrointestinal:  Softly distended, no rebound or guarding    Genitourinary:  Voids    Extremities:  No pedal edema    Skin:  diaphoretic, warm      LABS:  CBC Full  -  ( 06 Dec 2017 16:02 )  WBC Count : 18.5 K/uL  Hemoglobin : 10.6 g/dL  Hematocrit : 35.5 %  Platelet Count - Automated : 417 K/uL  Mean Cell Volume : 73.0 fl  Mean Cell Hemoglobin : 21.8 pg  Mean Cell Hemoglobin Concentration : 29.9 g/dL  Auto Neutrophil # : 14.4 K/uL  Auto Lymphocyte # : 1.6 K/uL  Auto Monocyte # : 2.4 K/uL  Auto Eosinophil # : 0.0 K/uL  Auto Basophil # : 0.0 K/uL  Auto Neutrophil % : 77.8 %  Auto Lymphocyte % : 8.7 %  Auto Monocyte % : 12.7 %  Auto Eosinophil % : 0.0 %  Auto Basophil % : 0.3 %    12-06    155<H>  |  104  |  67.0<H>  ----------------------------<  131<H>  5.3   |  32.0<H>  |  1.66<H>    Ca    9.5      06 Dec 2017 16:02    TPro  7.4  /  Alb  3.4  /  TBili  0.8  /  DBili  x   /  AST  x   /  ALT  427<H>  /  AlkPhos  104  12-06    PTT - ( 06 Dec 2017 07:39 )  PTT:41.9 sec    RECENT CULTURES:  12-04 .Urine Catheterized Pseudomonas aeruginosa XXXX   >100,000 CFU/ml Pseudomonas aeruginosa    12-03 .Blood Blood-Venous XXXX XXXX   No growth at 48 hours        LIVER FUNCTIONS - ( 06 Dec 2017 16:02 )  Alb: 3.4 g/dL / Pro: 7.4 g/dL / ALK PHOS: 104 U/L / ALT: 427 U/L / AST: x     / GGT: x           CARDIAC MARKERS ( 06 Dec 2017 16:02 )  x     / 0.16 ng/mL / x     / x     / x          CAPILLARY BLOOD GLUCOSE      RADIOLOGY & ADDITIONAL STUDIES:    ASSESSMENT/PLAN:  89yFemale presenting with:  Pt RRT with fever to 102 today, received Ofirmev, now down to 101.  Respiratory distress with increased accessory muscle use.  Noted to have metabolic alkalosis on ABG.  LA 2.2.  CXR with possible retrocardiac  consolidation, leukocytosis.  Pt UCx w/ Pseudomonas however do not attribute event of today to UTI.  Increasing HCO3 with increasing Na suggests intravascular hypovolemia.  Discussed case with Dr Tanner, recommends MICU consult.      Pt will likely need intubation.  IVF challenge and recommend CT CAP to further distinguish cause of leukocytosis not originating from abdominal source.  Call placed to Dr. Edmond.  Discussed with pt's family, family wants intubation if needed.  Please reconsult as needed.          CRITICAL CARE TIME SPENT: INTERVAL HPI/OVERNIGHT EVENTS/SUBJECTIVE:  Pt RRT for respiratory distress.  Pt post op 11/26 lap assisted sigmoid resection, subsequently had CVA while off anticoagulation.  Pt has had hospital course complicated by respiratory difficulty.      ICU Vital Signs Last 24 Hrs  T(C): 38.7 (06 Dec 2017 15:18), Max: 39 (06 Dec 2017 14:18)  T(F): 101.6 (06 Dec 2017 15:18), Max: 102.2 (06 Dec 2017 14:18)  HR: 107 (06 Dec 2017 15:18) (89 - 123)  BP: 145/62 (06 Dec 2017 15:18) (143/74 - 176/92)  BP(mean): 88 (06 Dec 2017 15:18) (85 - 117)  ABP: --  ABP(mean): --  RR: 41 (06 Dec 2017 15:18) (22 - 41)  SpO2: 97% (06 Dec 2017 15:18) (92% - 100%)      I&O's Detail    05 Dec 2017 07:01  -  06 Dec 2017 07:00  --------------------------------------------------------  IN:    Enteral Tube Flush: 120 mL    Enteral Tube Flush: 250 mL    Enteral Tube Flush: 200 mL    Glucerna: 720 mL    heparin  Infusion.: 276 mL    Solution: 50 mL  Total IN: 1616 mL    OUT:  Total OUT: 0 mL    Total NET: 1616 mL      06 Dec 2017 07:01  -  06 Dec 2017 16:42  --------------------------------------------------------  IN:    heparin  Infusion.: 96 mL  Total IN: 96 mL    OUT:  Total OUT: 0 mL    Total NET: 96 mL      MEDICATIONS  (STANDING):  ALBUTerol/ipratropium for Nebulization 3 milliLiter(s) Nebulizer every 6 hours  amylase/lipase/protease  (CREON  6,000 Units) 1 Capsule(s) Oral three times a day with meals  ascorbic acid 500 milliGRAM(s) Oral daily  calcium carbonate 1250 mG + Vitamin D (OsCal 500 + D) 1 Tablet(s) Oral two times a day  cefepime  IVPB 1000 milliGRAM(s) IV Intermittent every 24 hours  dextrose 5%. 1000 milliLiter(s) (50 mL/Hr) IV Continuous <Continuous>  dextrose 50% Injectable 12.5 Gram(s) IV Push once  dextrose 50% Injectable 25 Gram(s) IV Push once  dextrose 50% Injectable 25 Gram(s) IV Push once  diltiazem    Tablet 30 milliGRAM(s) Enteral Tube every 6 hours  furosemide   Injectable 20 milliGRAM(s) IV Push two times a day  heparin  Infusion.  Unit(s)/Hr (7 mL/Hr) IV Continuous <Continuous>  insulin glargine Injectable (LANTUS) 20 Unit(s) SubCutaneous at bedtime  insulin lispro (HumaLOG) corrective regimen sliding scale   SubCutaneous every 6 hours  labetalol Injectable 10 milliGRAM(s) IV Push every 6 hours  lidocaine   Patch 1 Patch Transdermal daily  methimazole 2.5 milliGRAM(s) Oral <User Schedule>  mirtazapine 15 milliGRAM(s) Oral at bedtime  potassium chloride    Tablet ER 20 milliEquivalent(s) Oral daily  sodium chloride 0.9% Bolus 500 milliLiter(s) IV Bolus every 15 minutes    MEDICATIONS  (PRN):  ALBUTerol    0.083% 2.5 milliGRAM(s) Nebulizer every 3 hours PRN Bronchospasm  atropine 1% Ophthalmic Solution for SubLingual Use 2 Drop(s) SubLingual every 6 hours PRN secreations  dextrose Gel 1 Dose(s) Oral once PRN Blood Glucose LESS THAN 70 milliGRAM(s)/deciliter  glucagon  Injectable 1 milliGRAM(s) IntraMuscular once PRN Glucose LESS THAN 70 milligrams/deciliter  magnesium sulfate  IVPB 2 Gram(s) IV Intermittent once PRN magnesium <1.8  morphine  - Injectable 2 milliGRAM(s) IV Push every 6 hours PRN Tachypnea  ondansetron Injectable 4 milliGRAM(s) IV Push every 6 hours PRN Nausea      NUTRITION/IVF: NPO at time of exam.  IVL    MOORE:   No      NG/OG TUBE:  NGT      PHYSICAL EXAM:    Gen:  Pt with obvious accessory muscle use    Eyes:  EOMI's, pupils 5mm BL sluggishly reactive    Neurological: Pt aphasic, able to open eyes to command    Neck:  No JVD noted    Pulmonary:  shallow breaths, coarse BS BL, crackles LLL    Cardiovascular:  Sinus tach    Gastrointestinal:  Softly distended, no rebound or guarding    Genitourinary:  Voids    Extremities:  No pedal edema    Skin:  diaphoretic, warm      LABS:  CBC Full  -  ( 06 Dec 2017 16:02 )  WBC Count : 18.5 K/uL  Hemoglobin : 10.6 g/dL  Hematocrit : 35.5 %  Platelet Count - Automated : 417 K/uL  Mean Cell Volume : 73.0 fl  Mean Cell Hemoglobin : 21.8 pg  Mean Cell Hemoglobin Concentration : 29.9 g/dL  Auto Neutrophil # : 14.4 K/uL  Auto Lymphocyte # : 1.6 K/uL  Auto Monocyte # : 2.4 K/uL  Auto Eosinophil # : 0.0 K/uL  Auto Basophil # : 0.0 K/uL  Auto Neutrophil % : 77.8 %  Auto Lymphocyte % : 8.7 %  Auto Monocyte % : 12.7 %  Auto Eosinophil % : 0.0 %  Auto Basophil % : 0.3 %    12-06    155<H>  |  104  |  67.0<H>  ----------------------------<  131<H>  5.3   |  32.0<H>  |  1.66<H>    Ca    9.5      06 Dec 2017 16:02    TPro  7.4  /  Alb  3.4  /  TBili  0.8  /  DBili  x   /  AST  x   /  ALT  427<H>  /  AlkPhos  104  12-06    PTT - ( 06 Dec 2017 07:39 )  PTT:41.9 sec    RECENT CULTURES:  12-04 .Urine Catheterized Pseudomonas aeruginosa XXXX   >100,000 CFU/ml Pseudomonas aeruginosa    12-03 .Blood Blood-Venous XXXX XXXX   No growth at 48 hours        LIVER FUNCTIONS - ( 06 Dec 2017 16:02 )  Alb: 3.4 g/dL / Pro: 7.4 g/dL / ALK PHOS: 104 U/L / ALT: 427 U/L / AST: x     / GGT: x           CARDIAC MARKERS ( 06 Dec 2017 16:02 )  x     / 0.16 ng/mL / x     / x     / x            ABG - ( 06 Dec 2017 16:09 )  pH: 7.50  /  pCO2: 46    /  pO2: 84    / HCO3: 35    / Base Excess: 11.4  /  SaO2: 97            RADIOLOGY & ADDITIONAL STUDIES:    ASSESSMENT/PLAN:  89yFemale presenting with:  Pt RRT with fever to 102 today, received Ofirmev, now down to 101.  Respiratory distress with increased accessory muscle use.  Noted to have metabolic alkalosis on ABG.  LA 2.2.  CXR with possible retrocardiac  consolidation, leukocytosis.  Pt UCx w/ Pseudomonas however do not attribute event of today to UTI.  Increasing HCO3 with increasing Na suggests intravascular hypovolemia.  Discussed case with Dr Tanner, recommends MICU consult.      Pt will likely need intubation.  IVF challenge and recommend CT CAP to further distinguish cause of leukocytosis not originating from abdominal source.  Call placed to Dr. Edmond.  Discussed with pt's family, family wants intubation if needed.  Please reconsult as needed.          CRITICAL CARE TIME SPENT: INTERVAL HPI/OVERNIGHT EVENTS/SUBJECTIVE:  Pt RRT for respiratory distress.  Pt post op 11/26 lap assisted sigmoid resection, subsequently had CVA while off anticoagulation.  Pt has had hospital course complicated by respiratory difficulty.      ICU Vital Signs Last 24 Hrs  T(C): 38.7 (06 Dec 2017 15:18), Max: 39 (06 Dec 2017 14:18)  T(F): 101.6 (06 Dec 2017 15:18), Max: 102.2 (06 Dec 2017 14:18)  HR: 107 (06 Dec 2017 15:18) (89 - 123)  BP: 145/62 (06 Dec 2017 15:18) (143/74 - 176/92)  BP(mean): 88 (06 Dec 2017 15:18) (85 - 117)  ABP: --  ABP(mean): --  RR: 41 (06 Dec 2017 15:18) (22 - 41)  SpO2: 97% (06 Dec 2017 15:18) (92% - 100%)      I&O's Detail    05 Dec 2017 07:01  -  06 Dec 2017 07:00  --------------------------------------------------------  IN:    Enteral Tube Flush: 120 mL    Enteral Tube Flush: 250 mL    Enteral Tube Flush: 200 mL    Glucerna: 720 mL    heparin  Infusion.: 276 mL    Solution: 50 mL  Total IN: 1616 mL    OUT:  Total OUT: 0 mL    Total NET: 1616 mL      06 Dec 2017 07:01  -  06 Dec 2017 16:42  --------------------------------------------------------  IN:    heparin  Infusion.: 96 mL  Total IN: 96 mL    OUT:  Total OUT: 0 mL    Total NET: 96 mL      MEDICATIONS  (STANDING):  ALBUTerol/ipratropium for Nebulization 3 milliLiter(s) Nebulizer every 6 hours  amylase/lipase/protease  (CREON  6,000 Units) 1 Capsule(s) Oral three times a day with meals  ascorbic acid 500 milliGRAM(s) Oral daily  calcium carbonate 1250 mG + Vitamin D (OsCal 500 + D) 1 Tablet(s) Oral two times a day  cefepime  IVPB 1000 milliGRAM(s) IV Intermittent every 24 hours  dextrose 5%. 1000 milliLiter(s) (50 mL/Hr) IV Continuous <Continuous>  dextrose 50% Injectable 12.5 Gram(s) IV Push once  dextrose 50% Injectable 25 Gram(s) IV Push once  dextrose 50% Injectable 25 Gram(s) IV Push once  diltiazem    Tablet 30 milliGRAM(s) Enteral Tube every 6 hours  furosemide   Injectable 20 milliGRAM(s) IV Push two times a day  heparin  Infusion.  Unit(s)/Hr (7 mL/Hr) IV Continuous <Continuous>  insulin glargine Injectable (LANTUS) 20 Unit(s) SubCutaneous at bedtime  insulin lispro (HumaLOG) corrective regimen sliding scale   SubCutaneous every 6 hours  labetalol Injectable 10 milliGRAM(s) IV Push every 6 hours  lidocaine   Patch 1 Patch Transdermal daily  methimazole 2.5 milliGRAM(s) Oral <User Schedule>  mirtazapine 15 milliGRAM(s) Oral at bedtime  potassium chloride    Tablet ER 20 milliEquivalent(s) Oral daily  sodium chloride 0.9% Bolus 500 milliLiter(s) IV Bolus every 15 minutes    MEDICATIONS  (PRN):  ALBUTerol    0.083% 2.5 milliGRAM(s) Nebulizer every 3 hours PRN Bronchospasm  atropine 1% Ophthalmic Solution for SubLingual Use 2 Drop(s) SubLingual every 6 hours PRN secreations  dextrose Gel 1 Dose(s) Oral once PRN Blood Glucose LESS THAN 70 milliGRAM(s)/deciliter  glucagon  Injectable 1 milliGRAM(s) IntraMuscular once PRN Glucose LESS THAN 70 milligrams/deciliter  magnesium sulfate  IVPB 2 Gram(s) IV Intermittent once PRN magnesium <1.8  morphine  - Injectable 2 milliGRAM(s) IV Push every 6 hours PRN Tachypnea  ondansetron Injectable 4 milliGRAM(s) IV Push every 6 hours PRN Nausea      NUTRITION/IVF: NPO at time of exam.  IVL    MOORE:   No      NG/OG TUBE:  NGT      PHYSICAL EXAM:    Gen:  Pt with obvious accessory muscle use    Eyes:  EOMI's, pupils 5mm BL sluggishly reactive    Neurological: Pt aphasic, able to open eyes to command    Neck:  No JVD noted    Pulmonary:  shallow breaths, coarse BS BL, crackles LLL    Cardiovascular:  Sinus tach    Gastrointestinal:  Softly distended, no rebound or guarding    Genitourinary:  Voids    Extremities:  No pedal edema    Skin:  diaphoretic, warm      LABS:  CBC Full  -  ( 06 Dec 2017 16:02 )  WBC Count : 18.5 K/uL  Hemoglobin : 10.6 g/dL  Hematocrit : 35.5 %  Platelet Count - Automated : 417 K/uL  Mean Cell Volume : 73.0 fl  Mean Cell Hemoglobin : 21.8 pg  Mean Cell Hemoglobin Concentration : 29.9 g/dL  Auto Neutrophil # : 14.4 K/uL  Auto Lymphocyte # : 1.6 K/uL  Auto Monocyte # : 2.4 K/uL  Auto Eosinophil # : 0.0 K/uL  Auto Basophil # : 0.0 K/uL  Auto Neutrophil % : 77.8 %  Auto Lymphocyte % : 8.7 %  Auto Monocyte % : 12.7 %  Auto Eosinophil % : 0.0 %  Auto Basophil % : 0.3 %    12-06    155<H>  |  104  |  67.0<H>  ----------------------------<  131<H>  5.3   |  32.0<H>  |  1.66<H>    Ca    9.5      06 Dec 2017 16:02    TPro  7.4  /  Alb  3.4  /  TBili  0.8  /  DBili  x   /  AST  x   /  ALT  427<H>  /  AlkPhos  104  12-06    PTT - ( 06 Dec 2017 07:39 )  PTT:41.9 sec    RECENT CULTURES:  12-04 .Urine Catheterized Pseudomonas aeruginosa XXXX   >100,000 CFU/ml Pseudomonas aeruginosa    12-03 .Blood Blood-Venous XXXX XXXX   No growth at 48 hours        LIVER FUNCTIONS - ( 06 Dec 2017 16:02 )  Alb: 3.4 g/dL / Pro: 7.4 g/dL / ALK PHOS: 104 U/L / ALT: 427 U/L / AST: x     / GGT: x           CARDIAC MARKERS ( 06 Dec 2017 16:02 )  x     / 0.16 ng/mL / x     / x     / x            ABG - ( 06 Dec 2017 16:09 )  pH: 7.50  /  pCO2: 46    /  pO2: 84    / HCO3: 35    / Base Excess: 11.4  /  SaO2: 97            RADIOLOGY & ADDITIONAL STUDIES:    ASSESSMENT/PLAN:  89yFemale presenting with:  Pt RRT with fever to 102 today, received Ofirmev, now down to 101.  Respiratory distress with increased accessory muscle use.  Noted to have metabolic alkalosis on ABG.  LA 2.2.  CXR with possible retrocardiac  consolidation, leukocytosis.  Pt UCx w/ Pseudomonas however do not attribute event of today to UTI.  Increasing HCO3 with increasing Na suggests intravascular hypovolemia, elevated Trop in setting of MILAN.  Discussed case with Dr Tanner, recommends MICU consult.      Pt will likely need intubation.  IVF challenge and recommend CT CAP to further distinguish cause of leukocytosis not originating from abdominal source.  Call placed to Dr. Edmond.  Discussed with pt's family, family wants intubation if needed.  Please reconsult as needed.          CRITICAL CARE TIME SPENT:

## 2017-12-06 NOTE — PROGRESS NOTE ADULT - SUBJECTIVE AND OBJECTIVE BOX
HPI:  88 yo female, c/o progressively enlarging abd girth over 1 yr, s/p colonoscopy 10/19/17, found to have tubular adenoma at ileocecal valve and 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 Pt denies Nausea/vomiting, diarrhea. C/o constipation. Denies fever/chills.    Pt direct admission for operative intervention by Dr Flowers on 11/20.    Pt c/o SOB/dyspnea and mild vague chest discomfort upon admission to the floor, also c/o tender inner left lower leg, also c/o LE edema L > R and coldness of LE's    Pulmonary consult - Dr Bang,    Primary care consult - Dr Barbosa    Cardiology consult - Dr Bush (16 Nov 2017 15:13)     Allergies    Cipro (Unknown)  contrast media (iodine-based) (Unknown)  penicillins (Unknown)  shellfish (Unknown)  Valium (Unknown)    Intolerances      Renal insufficiency  Malignant neoplasm of descending colon  Microcytic anemia  ILD (interstitial lung disease)  Tricuspid valve insufficiency, unspecified etiology  Mitral valve insufficiency, unspecified etiology  Aortic valve insufficiency, etiology of cardiac valve disease unspecified  IDDM (insulin dependent diabetes mellitus)  COPD, mild  Atrial fibrillation, unspecified type  Diabetes  Cardiomegaly  Congestive heart failure, unspecified congestive heart failure chronicity, unspecified congestive heart failure type    MEDICATIONS  (STANDING):  morphine  Infusion 5 mG/Hr (5 mL/Hr) IV Continuous <Continuous>    MEDICATIONS  (PRN):  atropine 1% Ophthalmic Solution for SubLingual Use 2 Drop(s) SubLingual every 6 hours PRN secretionsl  LORazepam   Injectable 1 milliGRAM(s) IV Push every 4 hours PRN respiratory distress  morphine  - Injectable 2 milliGRAM(s) IV Push every 4 hours PRN Pain or Tachypnea                           10.6   18.5  )-----------( 417      ( 06 Dec 2017 16:02 )             35.5     12-06    155<H>  |  104  |  67.0<H>  ----------------------------<  131<H>  5.3   |  32.0<H>  |  1.66<H>    Ca    9.5      06 Dec 2017 16:02    TPro  7.4  /  Alb  3.4  /  TBili  0.8  /  DBili  x   /  AST  1041<H>  /  ALT  427<H>  /  AlkPhos  104  12-06    PTT - ( 06 Dec 2017 07:39 )  PTT:41.9 sec  ;  Vital Signs Last 24 Hrs  T(C): 38.7 (06 Dec 2017 15:18), Max: 39 (06 Dec 2017 14:18)  T(F): 101.6 (06 Dec 2017 15:18), Max: 102.2 (06 Dec 2017 14:18)  HR: 107 (06 Dec 2017 16:41) (89 - 121)  BP: 172/78 (06 Dec 2017 16:41) (143/74 - 176/92)  BP(mean): 105 (06 Dec 2017 16:41) (85 - 117)  RR: 45 (06 Dec 2017 16:41) (24 - 45)  SpO2: 97% (06 Dec 2017 16:41) (92% - 100%)  CAPILLARY BLOOD GLUCOSE      12-05 @ 07:01  -  12-06 @ 07:00  --------------------------------------------------------  IN: 1616 mL / OUT: 0 mL / NET: 1616 mL    12-06 @ 07:01  -  12-06 @ 22:52  --------------------------------------------------------  IN: 106 mL / OUT: 0 mL / NET: 106 mL      Patient uncomfortable +SOB    HEENT: PEARLA  NGT  Neck: Supple +JVD  Cardio: S1 S2  TRISH Irregular  Pulm: CTA Upper Lower lobes Low course crackles   Ext: No DCT  Skin: No Rash  Neuro: obtunded Non Verbal       End of life Care- all meds dc cont morphine drip, ativan, Hospice called  Acute CVA   Dysphagia   Acute Congestive heart failure   COPD -  Acute Renal failure  ILD (interstitial lung disease) - Pulm  Multivalvular Dz - Cardiology  IDDM (insulin dependent diabetes mellitus)   Atrial fibrillation - Rate controlled    Family meeting prognosis grim the family has decided on palliative end of life care.

## 2017-12-06 NOTE — PROGRESS NOTE ADULT - SUBJECTIVE AND OBJECTIVE BOX
Seen and examined with Dr. Wheeler    daughter present at bedside   Hospital Day #  POD # 10 s/p laparoscopic assisted RUTHANN, sigmoid resection                 s/p acute stroke 11/30/17  IV: SL  diet: NGT in place tube feedings on hold at present.   Patient:  temp spike this am 101;  patient awake intermittent daughter says recognizes her sometimes. no acute changes at present time. Surgeon and daughter addresses patient , patient opens mouth as to respond no audible sounds.    T(C): 37.7 (12-06-17 @ 08:37), Max: 38.3 (12-06-17 @ 00:31)  HR: 97 (12-06-17 @ 07:56) (89 - 123)  BP: 165/63 (12-06-17 @ 07:56) (143/74 - 175/75)  RR: 26 (12-06-17 @ 07:56) (22 - 38)  SpO2: 93% (12-06-17 @ 07:56) (92% - 99%)  Wt(kg): --  chest:  Abdomen: soft, chronic distention, surgical site clean and dry skin clips in place; + BS, + flatus, +BM  output: incontinent.  Extremities: warm to palpation                           10.1   20.8  )-----------( 417      ( 06 Dec 2017 07:39 )             34.1       12-06    154<H>  |  105  |  57.0<H>  ----------------------------<  151<H>  4.4   |  34.0<H>  |  1.29    Ca    9.3      06 Dec 2017 07:39    TPro  7.2  /  Alb  3.1<L>  /  TBili  0.6  /  DBili  x   /  AST  77<H>  /  ALT  59<H>  /  AlkPhos  94  12-06      PTT - ( 06 Dec 2017 07:39 )  PTT:41.9 sec    xrays:    PAST MEDICAL & SURGICAL HISTORY:  Renal insufficiency  Malignant neoplasm of descending colon  Microcytic anemia  ILD (interstitial lung disease)  Tricuspid valve insufficiency, unspecified etiology  Mitral valve insufficiency, unspecified etiology  Aortic valve insufficiency, etiology of cardiac valve disease unspecified  IDDM (insulin dependent diabetes mellitus)  COPD, mild  Atrial fibrillation, unspecified type  Diabetes  Cardiomegaly  Congestive heart failure, unspecified congestive heart failure chronicity, unspecified congestive heart failure type  H/O: hysterectomy  History of laparoscopic cholecystectomy  H/O splenectomy      Impression: no significant change, s/p acute stroke, s/p laparoscopic colon resection, surgical site healing, tube feedings.    Plan: continue present care and management, consider transfer patient to medicine. surgeon discussed with patient daughter about completing speech and swallow evaluation and also if they have considered palliative care.  daughter states that these were her ( her mom's ) wishes.

## 2017-12-06 NOTE — PROGRESS NOTE ADULT - ASSESSMENT
Patient with recent surgery, CVA, CHF, A fib, NG tube feeding     1. Continue NG tube feeding  2. Optimize from cardiopulmonary status  3. Call us when once she is stable

## 2017-12-06 NOTE — CONSULT NOTE ADULT - ATTENDING COMMENTS
Pt seen and examined with [facility] PA agree with above. PT is an 89 year old female with multiple medical issues admitted several days ago underwent surgery and suffered post op CVA, now appears to be in her active dying process. Family discussions led to agreement on comfort care. Pt does not require ICU.

## 2017-12-06 NOTE — CONSULT NOTE ADULT - ASSESSMENT
1. Colon mass-likely Ca for resection.  2. microcytic anemia likely secondary to above  3. anticoagulated-coumadin dc'd-allowing INR to return to nl  4. atrial fib rate controlled  5. ILD  6. CHFpEF-diastolic dysfunction  7. Moderate AI/MR/TR  8. thyroid disease.   9. IDDM  10.  renal impairment evidence by elevated bun and creatinine. (hx of diabetes and dual diuretic use). Consider decreasing her diuretics.  Pt. is an increased surgical and anesthetic risk given her significant comorbidities. There are no absolute contraindications to surgery.
89 YOF POD #11 Colon resection for cancer with acute hypoxic respiratory failure likely aspiration pneumonia likely sepsis, acute on chronic renal failure, liver failure, s/p multiple cerebral infarcts, AMS.
CRF, ARF due to pre renal azotemia due to diuretics  (Pt well known to me from office)  Colonic neoplasm scheduled for OR  - cardiology and pulmonary noted  - pt at increased risk for post op complications given co-morbid conditions  - will follow with you
Imp--Pt with mild COPD appears at baseline exam and cxr.  Plan--Duoneb  Cleared for surgery from pul POV
Patient with recent colon surgery and tolerating NG tube feeding. She has acute CVA and now is on IV heparin.     1. At this time, I will recommend to follow speech and swallow evaluation   2. Continue NG tube feeding  3. If she does not improve, we can consider performing EGD with PEG tube placement, but will defer it for the time being  4. She needs to be stabilized from cardiopulmonary point of view and will need neurology reevaluation before PEG tube placement

## 2017-12-06 NOTE — PROGRESS NOTE ADULT - PROVIDER SPECIALTY LIST ADULT
Anesthesia
Anesthesia
Cardiology
Colorectal Surgery
Family Medicine
Gastroenterology
Nephrology
Neurology
Pulmonology
Pulmonology
SICU
Surgery
Cardiology
Cardiology
Family Medicine
Family Medicine
Nephrology

## 2017-12-07 VITALS — TEMPERATURE: 100 F

## 2017-12-07 DIAGNOSIS — I50.9 HEART FAILURE, UNSPECIFIED: ICD-10-CM

## 2017-12-07 NOTE — DISCHARGE NOTE FOR THE EXPIRED PATIENT - HOSPITAL COURSE
pt is an 89 YOF POD#11 colon resection of colon mass/CA, HTN, HLD, Afib, COPD/ILD with pulmonary hypertension, DM, moderate AS, MR/TR, post operatively while off coumadin pt had multiple embolic infarcts to b/l frontal and b/l parietal lobes.  She has failed her swallowing eval over the last several days due to lethargy and has a feeding tube.  She has developed fevers and is on Cefepime currently for UTI.  Tonight RRT was called due to tachypnea, hypoxia, fever, tachycardia.  Pt has developed acute renal insufficiency and elevated LFTs today.  She is hypoxic on NRB 02.  moderate to severe respiratory distress on NRB.  Pt was already seen and was refused by SICU team.  Pt End of life Care- all meds dc cont morphine drip, ativan, Hospice called  Acute CVA .Family meeting prognosis grim the family has decided on palliative end of life care.

## 2017-12-07 NOTE — DISCHARGE NOTE FOR THE EXPIRED PATIENT - OTHER SIGNIFICANT FINDINGS
Pt was in soft wrist restraints within the last 24 hours. Restraints discontinued at 1900 on 12/6/2017. Pts time of death was 0335 12/7/2017. Noted in CMS log 12/7/2017 0340.

## 2017-12-08 LAB
CULTURE RESULTS: SIGNIFICANT CHANGE UP
SPECIMEN SOURCE: SIGNIFICANT CHANGE UP

## 2017-12-11 LAB
CULTURE RESULTS: SIGNIFICANT CHANGE UP
CULTURE RESULTS: SIGNIFICANT CHANGE UP
PATH REPORT ADDENDUM.SYNOPTIC DOC: SIGNIFICANT CHANGE UP
SPECIMEN SOURCE: SIGNIFICANT CHANGE UP
SPECIMEN SOURCE: SIGNIFICANT CHANGE UP

## 2017-12-19 PROCEDURE — 85730 THROMBOPLASTIN TIME PARTIAL: CPT

## 2017-12-19 PROCEDURE — 86920 COMPATIBILITY TEST SPIN: CPT

## 2017-12-19 PROCEDURE — 71250 CT THORAX DX C-: CPT

## 2017-12-19 PROCEDURE — 97163 PT EVAL HIGH COMPLEX 45 MIN: CPT

## 2017-12-19 PROCEDURE — 86900 BLOOD TYPING SEROLOGIC ABO: CPT

## 2017-12-19 PROCEDURE — 82378 CARCINOEMBRYONIC ANTIGEN: CPT

## 2017-12-19 PROCEDURE — 87186 SC STD MICRODIL/AGAR DIL: CPT

## 2017-12-19 PROCEDURE — 88305 TISSUE EXAM BY PATHOLOGIST: CPT

## 2017-12-19 PROCEDURE — 80076 HEPATIC FUNCTION PANEL: CPT

## 2017-12-19 PROCEDURE — 81001 URINALYSIS AUTO W/SCOPE: CPT

## 2017-12-19 PROCEDURE — 83880 ASSAY OF NATRIURETIC PEPTIDE: CPT

## 2017-12-19 PROCEDURE — 97116 GAIT TRAINING THERAPY: CPT

## 2017-12-19 PROCEDURE — 83036 HEMOGLOBIN GLYCOSYLATED A1C: CPT

## 2017-12-19 PROCEDURE — 92610 EVALUATE SWALLOWING FUNCTION: CPT

## 2017-12-19 PROCEDURE — 85027 COMPLETE CBC AUTOMATED: CPT

## 2017-12-19 PROCEDURE — 31720 CLEARANCE OF AIRWAYS: CPT

## 2017-12-19 PROCEDURE — 74000: CPT

## 2017-12-19 PROCEDURE — 84466 ASSAY OF TRANSFERRIN: CPT

## 2017-12-19 PROCEDURE — 82803 BLOOD GASES ANY COMBINATION: CPT

## 2017-12-19 PROCEDURE — 94760 N-INVAS EAR/PLS OXIMETRY 1: CPT

## 2017-12-19 PROCEDURE — 85014 HEMATOCRIT: CPT

## 2017-12-19 PROCEDURE — 86901 BLOOD TYPING SEROLOGIC RH(D): CPT

## 2017-12-19 PROCEDURE — 92526 ORAL FUNCTION THERAPY: CPT

## 2017-12-19 PROCEDURE — 36600 WITHDRAWAL OF ARTERIAL BLOOD: CPT

## 2017-12-19 PROCEDURE — 83550 IRON BINDING TEST: CPT

## 2017-12-19 PROCEDURE — 86140 C-REACTIVE PROTEIN: CPT

## 2017-12-19 PROCEDURE — 88341 IMHCHEM/IMCYTCHM EA ADD ANTB: CPT

## 2017-12-19 PROCEDURE — 71045 X-RAY EXAM CHEST 1 VIEW: CPT

## 2017-12-19 PROCEDURE — 82947 ASSAY GLUCOSE BLOOD QUANT: CPT

## 2017-12-19 PROCEDURE — 87086 URINE CULTURE/COLONY COUNT: CPT

## 2017-12-19 PROCEDURE — 88309 TISSUE EXAM BY PATHOLOGIST: CPT

## 2017-12-19 PROCEDURE — 84295 ASSAY OF SERUM SODIUM: CPT

## 2017-12-19 PROCEDURE — 87040 BLOOD CULTURE FOR BACTERIA: CPT

## 2017-12-19 PROCEDURE — 80048 BASIC METABOLIC PNL TOTAL CA: CPT

## 2017-12-19 PROCEDURE — 74176 CT ABD & PELVIS W/O CONTRAST: CPT

## 2017-12-19 PROCEDURE — 82550 ASSAY OF CK (CPK): CPT

## 2017-12-19 PROCEDURE — 84145 PROCALCITONIN (PCT): CPT

## 2017-12-19 PROCEDURE — 83735 ASSAY OF MAGNESIUM: CPT

## 2017-12-19 PROCEDURE — 70450 CT HEAD/BRAIN W/O DYE: CPT

## 2017-12-19 PROCEDURE — 94640 AIRWAY INHALATION TREATMENT: CPT

## 2017-12-19 PROCEDURE — 82009 KETONE BODYS QUAL: CPT

## 2017-12-19 PROCEDURE — 36415 COLL VENOUS BLD VENIPUNCTURE: CPT

## 2017-12-19 PROCEDURE — 84443 ASSAY THYROID STIM HORMONE: CPT

## 2017-12-19 PROCEDURE — 84100 ASSAY OF PHOSPHORUS: CPT

## 2017-12-19 PROCEDURE — 97530 THERAPEUTIC ACTIVITIES: CPT

## 2017-12-19 PROCEDURE — 94002 VENT MGMT INPAT INIT DAY: CPT

## 2017-12-19 PROCEDURE — 84484 ASSAY OF TROPONIN QUANT: CPT

## 2017-12-19 PROCEDURE — 80053 COMPREHEN METABOLIC PANEL: CPT

## 2017-12-19 PROCEDURE — 93306 TTE W/DOPPLER COMPLETE: CPT

## 2017-12-19 PROCEDURE — 97110 THERAPEUTIC EXERCISES: CPT

## 2017-12-19 PROCEDURE — 83605 ASSAY OF LACTIC ACID: CPT

## 2017-12-19 PROCEDURE — 80061 LIPID PANEL: CPT

## 2017-12-19 PROCEDURE — 85610 PROTHROMBIN TIME: CPT

## 2017-12-19 PROCEDURE — 82962 GLUCOSE BLOOD TEST: CPT

## 2017-12-19 PROCEDURE — 82728 ASSAY OF FERRITIN: CPT

## 2017-12-19 PROCEDURE — 82330 ASSAY OF CALCIUM: CPT

## 2017-12-19 PROCEDURE — 88342 IMHCHEM/IMCYTCHM 1ST ANTB: CPT

## 2017-12-19 PROCEDURE — 84550 ASSAY OF BLOOD/URIC ACID: CPT

## 2017-12-19 PROCEDURE — 86850 RBC ANTIBODY SCREEN: CPT

## 2017-12-19 PROCEDURE — 93005 ELECTROCARDIOGRAM TRACING: CPT

## 2017-12-19 PROCEDURE — 82435 ASSAY OF BLOOD CHLORIDE: CPT

## 2017-12-19 PROCEDURE — 84132 ASSAY OF SERUM POTASSIUM: CPT

## 2017-12-19 PROCEDURE — 70551 MRI BRAIN STEM W/O DYE: CPT

## 2017-12-19 PROCEDURE — 93880 EXTRACRANIAL BILAT STUDY: CPT

## 2018-05-07 ENCOUNTER — APPOINTMENT (OUTPATIENT)
Dept: PULMONOLOGY | Facility: CLINIC | Age: 83
End: 2018-05-07

## 2024-10-17 NOTE — BRIEF OPERATIVE NOTE - ANTIBIOTIC PROTOCOL
What Type Of Note Output Would You Prefer (Optional)?: Standard Output
Hpi Title: Evaluation of Skin Lesions
Followed protocol